# Patient Record
Sex: FEMALE | Race: WHITE | Employment: OTHER | URBAN - METROPOLITAN AREA
[De-identification: names, ages, dates, MRNs, and addresses within clinical notes are randomized per-mention and may not be internally consistent; named-entity substitution may affect disease eponyms.]

---

## 2017-07-30 ENCOUNTER — HOSPITAL ENCOUNTER (OUTPATIENT)
Age: 67
Discharge: HOME | End: 2017-07-30
Attending: EMERGENCY MEDICINE
Payer: COMMERCIAL

## 2017-07-30 VITALS
WEIGHT: 160 LBS | RESPIRATION RATE: 16 BRPM | OXYGEN SATURATION: 100 % | TEMPERATURE: 98 F | SYSTOLIC BLOOD PRESSURE: 156 MMHG | HEART RATE: 71 BPM | BODY MASS INDEX: 25.11 KG/M2 | HEIGHT: 67 IN | DIASTOLIC BLOOD PRESSURE: 86 MMHG

## 2017-07-30 DIAGNOSIS — H10.13 ALLERGIC CONJUNCTIVITIS, BILATERAL: ICD-10-CM

## 2017-07-30 DIAGNOSIS — J30.2 SEASONAL ALLERGIC RHINITIS, UNSPECIFIED CHRONICITY, UNSPECIFIED TRIGGER: Primary | ICD-10-CM

## 2017-07-30 DIAGNOSIS — H65.92 OTITIS MEDIA WITH EFFUSION, LEFT: ICD-10-CM

## 2017-07-30 PROCEDURE — 99204 OFFICE O/P NEW MOD 45 MIN: CPT

## 2017-07-30 PROCEDURE — 99203 OFFICE O/P NEW LOW 30 MIN: CPT

## 2017-07-30 RX ORDER — OLOPATADINE HYDROCHLORIDE 1 MG/ML
1 SOLUTION/ DROPS OPHTHALMIC 2 TIMES DAILY
Qty: 1 BOTTLE | Refills: 0 | Status: SHIPPED | OUTPATIENT
Start: 2017-07-30 | End: 2017-10-06

## 2017-07-30 RX ORDER — FLUTICASONE PROPIONATE 50 MCG
1-2 SPRAY, SUSPENSION (ML) NASAL DAILY
Qty: 16 G | Refills: 0 | Status: SHIPPED | OUTPATIENT
Start: 2017-07-30 | End: 2017-08-29

## 2017-07-30 NOTE — ED INITIAL ASSESSMENT (HPI)
Last Friday developed allery issues with stuffy nose  Used otc sudafed and allegra w/o relief. Now continues with eye running scratching throat deep dry cough-non productive. now eyes swollen this am and left ear plugged.

## 2017-07-30 NOTE — ED PROVIDER NOTES
Patient Seen in: [facility] In [address] [place] [address]    History   Patient presents with:  Cough/URI    Stated Complaint: bilateral pink eyes/    HPI  For the last 5 or 6 days patient has had a runny nose, itchy and watery eyes, scratchy throat, 20/30, Corrected    Physical Exam   Constitutional: She is oriented to person, place, and time. She appears well-developed and well-nourished. HENT:   Head: Normocephalic and atraumatic.    Right Ear: Tympanic membrane normal.   Left Ear: A middle ear eff these medications    Fluticasone Propionate 50 MCG/ACT Nasal Suspension  1-2 sprays by Nasal route daily. , Hawa Disp-16 g, R-0    Olopatadine HCl 0.1 % Ophthalmic Solution  Place 1 drop into both eyes 2 (two) times daily. , Hawa, Disp-1 Bottle, R-0

## 2017-10-06 ENCOUNTER — OFFICE VISIT (OUTPATIENT)
Dept: INTERNAL MEDICINE CLINIC | Facility: CLINIC | Age: 67
End: 2017-10-06

## 2017-10-06 VITALS
DIASTOLIC BLOOD PRESSURE: 90 MMHG | SYSTOLIC BLOOD PRESSURE: 172 MMHG | TEMPERATURE: 99 F | HEART RATE: 72 BPM | OXYGEN SATURATION: 98 % | RESPIRATION RATE: 17 BRPM | HEIGHT: 67 IN | BODY MASS INDEX: 28.44 KG/M2 | WEIGHT: 181.19 LBS

## 2017-10-06 DIAGNOSIS — R03.0 ELEVATED BP WITHOUT DIAGNOSIS OF HYPERTENSION: ICD-10-CM

## 2017-10-06 DIAGNOSIS — Z00.00 ROUTINE GENERAL MEDICAL EXAMINATION AT A HEALTH CARE FACILITY: Primary | ICD-10-CM

## 2017-10-06 DIAGNOSIS — Z12.31 ENCOUNTER FOR SCREENING MAMMOGRAM FOR BREAST CANCER: ICD-10-CM

## 2017-10-06 PROCEDURE — 93000 ELECTROCARDIOGRAM COMPLETE: CPT | Performed by: INTERNAL MEDICINE

## 2017-10-06 PROCEDURE — 99386 PREV VISIT NEW AGE 40-64: CPT | Performed by: INTERNAL MEDICINE

## 2017-10-06 NOTE — PROGRESS NOTES
HPI:    Patient ID: Neva Jonas is a 61year old female.     HPI  Here as new pt to practice, ho total hyst in 2000 for fibroids, last cscope just under 10 years, due for zostavax, wants flu at [facility], has been on bp meds in past, occ walks, no acute c report and bring to fu apt  Encounter for screening mammogram for breast cancer-joanna ordered  Elevated bp without diagnosis of hypertension-states home ok, but ho htn in past, fu in 3-4 weeks with home cuff      Orders Placed This Encounter      Comp [employer]

## 2017-10-20 ENCOUNTER — LAB ENCOUNTER (OUTPATIENT)
Dept: LAB | Age: 67
End: 2017-10-20
Attending: INTERNAL MEDICINE

## 2017-10-20 DIAGNOSIS — R03.0 ELEVATED BP WITHOUT DIAGNOSIS OF HYPERTENSION: ICD-10-CM

## 2017-10-20 PROCEDURE — 80053 COMPREHEN METABOLIC PANEL: CPT | Performed by: INTERNAL MEDICINE

## 2017-10-20 PROCEDURE — 80061 LIPID PANEL: CPT | Performed by: INTERNAL MEDICINE

## 2017-10-20 PROCEDURE — 85025 COMPLETE CBC W/AUTO DIFF WBC: CPT | Performed by: INTERNAL MEDICINE

## 2017-10-20 PROCEDURE — 81001 URINALYSIS AUTO W/SCOPE: CPT | Performed by: INTERNAL MEDICINE

## 2017-11-06 ENCOUNTER — HOSPITAL (OUTPATIENT)
Dept: OTHER | Age: 67
End: 2017-11-06

## 2017-11-07 ENCOUNTER — HOSPITAL (OUTPATIENT)
Dept: OTHER | Age: 67
End: 2017-11-07

## 2017-11-07 ENCOUNTER — IMAGING SERVICES (OUTPATIENT)
Dept: OTHER | Age: 67
End: 2017-11-07

## 2017-11-09 ENCOUNTER — MED REC SCAN ONLY (OUTPATIENT)
Dept: INTERNAL MEDICINE CLINIC | Facility: CLINIC | Age: 67
End: 2017-11-09

## 2017-11-13 ENCOUNTER — OFFICE VISIT (OUTPATIENT)
Dept: INTERNAL MEDICINE CLINIC | Facility: CLINIC | Age: 67
End: 2017-11-13

## 2017-11-13 VITALS
DIASTOLIC BLOOD PRESSURE: 84 MMHG | HEIGHT: 67 IN | HEART RATE: 68 BPM | SYSTOLIC BLOOD PRESSURE: 152 MMHG | TEMPERATURE: 99 F | RESPIRATION RATE: 16 BRPM | BODY MASS INDEX: 28.72 KG/M2 | WEIGHT: 183 LBS

## 2017-11-13 DIAGNOSIS — Z23 FLU VACCINE NEED: Primary | ICD-10-CM

## 2017-11-13 DIAGNOSIS — R79.89 ELEVATED LFTS: ICD-10-CM

## 2017-11-13 DIAGNOSIS — I10 ESSENTIAL HYPERTENSION: ICD-10-CM

## 2017-11-13 PROCEDURE — 90471 IMMUNIZATION ADMIN: CPT | Performed by: INTERNAL MEDICINE

## 2017-11-13 PROCEDURE — 99213 OFFICE O/P EST LOW 20 MIN: CPT | Performed by: INTERNAL MEDICINE

## 2017-11-13 PROCEDURE — 90686 IIV4 VACC NO PRSV 0.5 ML IM: CPT | Performed by: INTERNAL MEDICINE

## 2017-11-13 RX ORDER — IRBESARTAN 150 MG/1
150 TABLET ORAL NIGHTLY
Qty: 30 TABLET | Refills: 1 | Status: SHIPPED | OUTPATIENT
Start: 2017-11-13 | End: 2017-12-13

## 2017-11-13 NOTE — PROGRESS NOTES
HPI:    Patient ID: Maciej Vincent is a 61year old female.     HPI  Here for htn fu, home numbers high, didn't bring cuff, mildly elevated lft's, feels well, labs reviewed  /74 (BP Location: Left arm, Patient Position: Sitting, Cuff Size: adult)

## 2017-11-14 NOTE — PROGRESS NOTES
Pt was notified of benign findings and also informed of recommended f/u in one year 11/2018. Pt verbalized understanding, agreed with POC and had no further questions.

## 2017-11-30 ENCOUNTER — LAB ENCOUNTER (OUTPATIENT)
Dept: LAB | Age: 67
End: 2017-11-30
Attending: INTERNAL MEDICINE

## 2017-11-30 DIAGNOSIS — R79.89 ELEVATED LFTS: ICD-10-CM

## 2017-11-30 DIAGNOSIS — I10 ESSENTIAL HYPERTENSION: ICD-10-CM

## 2017-11-30 PROCEDURE — 82728 ASSAY OF FERRITIN: CPT | Performed by: INTERNAL MEDICINE

## 2017-11-30 PROCEDURE — 80053 COMPREHEN METABOLIC PANEL: CPT | Performed by: INTERNAL MEDICINE

## 2017-11-30 PROCEDURE — 80074 ACUTE HEPATITIS PANEL: CPT | Performed by: INTERNAL MEDICINE

## 2017-12-13 ENCOUNTER — OFFICE VISIT (OUTPATIENT)
Dept: INTERNAL MEDICINE CLINIC | Facility: CLINIC | Age: 67
End: 2017-12-13

## 2017-12-13 VITALS
DIASTOLIC BLOOD PRESSURE: 100 MMHG | BODY MASS INDEX: 28.72 KG/M2 | SYSTOLIC BLOOD PRESSURE: 162 MMHG | HEART RATE: 76 BPM | HEIGHT: 67 IN | TEMPERATURE: 98 F | WEIGHT: 183 LBS

## 2017-12-13 DIAGNOSIS — R00.2 PALPITATIONS: ICD-10-CM

## 2017-12-13 DIAGNOSIS — I10 ESSENTIAL HYPERTENSION: Primary | ICD-10-CM

## 2017-12-13 PROCEDURE — 99213 OFFICE O/P EST LOW 20 MIN: CPT | Performed by: INTERNAL MEDICINE

## 2017-12-13 RX ORDER — IRBESARTAN 150 MG/1
150 TABLET ORAL NIGHTLY
Qty: 30 TABLET | Refills: 1 | Status: SHIPPED | OUTPATIENT
Start: 2017-12-13 | End: 2018-01-19

## 2017-12-13 RX ORDER — AMLODIPINE BESYLATE 5 MG/1
5 TABLET ORAL DAILY
Qty: 30 TABLET | Refills: 3 | Status: SHIPPED | OUTPATIENT
Start: 2017-12-13 | End: 2018-01-19

## 2017-12-13 NOTE — PROGRESS NOTES
HPI:    Patient ID: Chuy Turner is a 74year old female.     HPI  Here for htn, occ palpitations, no cp, bp still up, labs reviewed  BP (!) 162/100   Pulse 76   Temp 97.6 °F (36.4 °C) (Oral)   Ht 67\"   Wt 183 lb   BMI 28.66 kg/m²     Review of System

## 2017-12-18 ENCOUNTER — HOSPITAL ENCOUNTER (OUTPATIENT)
Dept: CV DIAGNOSTICS | Facility: HOSPITAL | Age: 67
Discharge: HOME | End: 2017-12-18
Attending: INTERNAL MEDICINE
Payer: COMMERCIAL

## 2017-12-18 DIAGNOSIS — R00.2 PALPITATIONS: ICD-10-CM

## 2017-12-18 PROCEDURE — 93225 XTRNL ECG REC<48 HRS REC: CPT | Performed by: INTERNAL MEDICINE

## 2017-12-18 PROCEDURE — 93227 XTRNL ECG REC<48 HR R&I: CPT | Performed by: INTERNAL MEDICINE

## 2017-12-18 PROCEDURE — 93226 XTRNL ECG REC<48 HR SCAN A/R: CPT | Performed by: INTERNAL MEDICINE

## 2017-12-19 ENCOUNTER — OFFICE VISIT (OUTPATIENT)
Dept: INTERNAL MEDICINE CLINIC | Facility: CLINIC | Age: 67
End: 2017-12-19

## 2017-12-19 VITALS
BODY MASS INDEX: 28.25 KG/M2 | HEIGHT: 67 IN | SYSTOLIC BLOOD PRESSURE: 142 MMHG | HEART RATE: 80 BPM | WEIGHT: 180 LBS | RESPIRATION RATE: 18 BRPM | DIASTOLIC BLOOD PRESSURE: 84 MMHG

## 2017-12-19 DIAGNOSIS — I10 ESSENTIAL HYPERTENSION: Primary | ICD-10-CM

## 2017-12-19 DIAGNOSIS — R00.2 PALPITATIONS: ICD-10-CM

## 2017-12-19 PROCEDURE — 99214 OFFICE O/P EST MOD 30 MIN: CPT | Performed by: NURSE PRACTITIONER

## 2017-12-19 RX ORDER — HYDROCHLOROTHIAZIDE 12.5 MG/1
12.5 TABLET ORAL DAILY
Qty: 30 TABLET | Refills: 3 | Status: SHIPPED | OUTPATIENT
Start: 2017-12-19 | End: 2018-01-19

## 2017-12-19 NOTE — PROGRESS NOTES
Pinky Webster is a 74year old female. Patient presents with:  Blood Pressure: f/u . Sparkle Miranda after halter monitor. and additional bp meds. HPI:   Here for follow up   Seen by NIVIA 12/13 for HTN and palpitations. holter completed and turned in today.   No EXAM:   /84   Pulse 80   Resp 18   Ht 67\"   Wt 180 lb   BMI 28.19 kg/m²   GENERAL: well developed, well nourished,in no apparent distress  LUNGS: normal rate without respiratory distress, lungs clear to auscultation  CARDIO: RRR without murmur

## 2018-01-19 ENCOUNTER — OFFICE VISIT (OUTPATIENT)
Dept: INTERNAL MEDICINE CLINIC | Facility: CLINIC | Age: 68
End: 2018-01-19

## 2018-01-19 VITALS
DIASTOLIC BLOOD PRESSURE: 72 MMHG | SYSTOLIC BLOOD PRESSURE: 132 MMHG | TEMPERATURE: 98 F | HEIGHT: 67 IN | WEIGHT: 180.63 LBS | HEART RATE: 80 BPM | BODY MASS INDEX: 28.35 KG/M2

## 2018-01-19 DIAGNOSIS — I10 ESSENTIAL HYPERTENSION: Primary | ICD-10-CM

## 2018-01-19 DIAGNOSIS — Z78.0 POST-MENOPAUSAL: ICD-10-CM

## 2018-01-19 DIAGNOSIS — R00.2 PALPITATION: ICD-10-CM

## 2018-01-19 DIAGNOSIS — Z12.11 SPECIAL SCREENING FOR MALIGNANT NEOPLASMS, COLON: ICD-10-CM

## 2018-01-19 PROCEDURE — 90471 IMMUNIZATION ADMIN: CPT | Performed by: INTERNAL MEDICINE

## 2018-01-19 PROCEDURE — 99214 OFFICE O/P EST MOD 30 MIN: CPT | Performed by: INTERNAL MEDICINE

## 2018-01-19 PROCEDURE — 90670 PCV13 VACCINE IM: CPT | Performed by: INTERNAL MEDICINE

## 2018-01-19 RX ORDER — AMLODIPINE BESYLATE 5 MG/1
5 TABLET ORAL DAILY
Qty: 90 TABLET | Refills: 3 | Status: SHIPPED | OUTPATIENT
Start: 2018-01-19 | End: 2018-03-23

## 2018-01-19 RX ORDER — IRBESARTAN 150 MG/1
150 TABLET ORAL NIGHTLY
Qty: 90 TABLET | Refills: 1 | Status: SHIPPED | OUTPATIENT
Start: 2018-01-19 | End: 2018-03-23

## 2018-01-19 RX ORDER — METOPROLOL SUCCINATE 50 MG/1
50 TABLET, EXTENDED RELEASE ORAL DAILY
Qty: 90 TABLET | Refills: 0 | Status: SHIPPED | OUTPATIENT
Start: 2018-01-19 | End: 2018-02-07

## 2018-01-19 NOTE — PROGRESS NOTES
HPI:    Patient ID: Gabriela Rock is a 74year old female.     HPI  Here for palpitation, htn, now 67, needs prevnar and dexa, still awakes with rapid hr a few times a week, no cp or sob, palpitations not better or worse with anthying, not assoc with an (primary encounter diagnosis)-stop hctz, start toprol, bmp in one month and fu in 2  Palpitation-stop hctz, bmp in a month, add toprol, fu in 2 months, reviewed holter results with pt  Post-menopausal-dexa  Special screening for malignant neoplasms, colon-

## 2018-02-06 ENCOUNTER — LAB ENCOUNTER (OUTPATIENT)
Dept: LAB | Age: 68
End: 2018-02-06
Attending: INTERNAL MEDICINE

## 2018-02-06 DIAGNOSIS — I10 ESSENTIAL HYPERTENSION: ICD-10-CM

## 2018-02-06 LAB
BUN BLD-MCNC: 16 MG/DL (ref 8–20)
CALCIUM BLD-MCNC: 9.1 MG/DL (ref 8.3–10.3)
CHLORIDE: 108 MMOL/L (ref 101–111)
CO2: 26 MMOL/L (ref 22–32)
CREAT BLD-MCNC: 0.78 MG/DL (ref 0.55–1.02)
GLUCOSE BLD-MCNC: 83 MG/DL (ref 70–99)
POTASSIUM SERPL-SCNC: 4.2 MMOL/L (ref 3.6–5.1)
SODIUM SERPL-SCNC: 143 MMOL/L (ref 136–144)

## 2018-02-06 PROCEDURE — 80048 BASIC METABOLIC PNL TOTAL CA: CPT | Performed by: INTERNAL MEDICINE

## 2018-02-09 RX ORDER — METOPROLOL SUCCINATE 50 MG/1
50 TABLET, EXTENDED RELEASE ORAL DAILY
Qty: 90 TABLET | Refills: 0 | Status: SHIPPED | OUTPATIENT
Start: 2018-02-09 | End: 2018-03-23

## 2018-02-09 NOTE — TELEPHONE ENCOUNTER
From: Margaret Verma  Sent: 2/7/2018 7:11 PM CST  Subject: Medication Renewal Request    Usman Marimichael.  Hira Realrobelmichael would like a refill of the following medications:     Metoprolol Succinate ER (TOPROL XL) 50 MG Oral Tablet 24 Hr [Bernabe Pitt MD]    Prefer

## 2018-03-23 ENCOUNTER — OFFICE VISIT (OUTPATIENT)
Dept: INTERNAL MEDICINE CLINIC | Facility: CLINIC | Age: 68
End: 2018-03-23

## 2018-03-23 VITALS
DIASTOLIC BLOOD PRESSURE: 80 MMHG | WEIGHT: 186 LBS | TEMPERATURE: 98 F | HEART RATE: 68 BPM | BODY MASS INDEX: 29.19 KG/M2 | SYSTOLIC BLOOD PRESSURE: 132 MMHG | HEIGHT: 67 IN | RESPIRATION RATE: 16 BRPM

## 2018-03-23 DIAGNOSIS — Z12.11 SPECIAL SCREENING FOR MALIGNANT NEOPLASMS, COLON: ICD-10-CM

## 2018-03-23 DIAGNOSIS — R00.2 PALPITATION: ICD-10-CM

## 2018-03-23 DIAGNOSIS — I10 ESSENTIAL HYPERTENSION: Primary | ICD-10-CM

## 2018-03-23 PROCEDURE — 99213 OFFICE O/P EST LOW 20 MIN: CPT | Performed by: INTERNAL MEDICINE

## 2018-03-23 RX ORDER — AMLODIPINE BESYLATE 5 MG/1
5 TABLET ORAL DAILY
Qty: 90 TABLET | Refills: 1 | Status: SHIPPED | OUTPATIENT
Start: 2018-03-23 | End: 2018-06-26

## 2018-03-23 RX ORDER — METOPROLOL SUCCINATE 50 MG/1
50 TABLET, EXTENDED RELEASE ORAL DAILY
Qty: 90 TABLET | Refills: 1 | Status: SHIPPED | OUTPATIENT
Start: 2018-03-23 | End: 2018-10-01

## 2018-03-23 RX ORDER — IRBESARTAN 150 MG/1
150 TABLET ORAL NIGHTLY
Qty: 90 TABLET | Refills: 1 | Status: SHIPPED | OUTPATIENT
Start: 2018-03-23 | End: 2018-06-26

## 2018-03-23 NOTE — PROGRESS NOTES
HPI:    Patient ID: Evert Matos is a 74year old female.     HPI  Here for htn and palpitation fu, due for cscope, wants to wait, will do stool cards  /80   Pulse 68   Temp 97.6 °F (36.4 °C) (Oral)   Resp 16   Ht 67\"   Wt 186 lb   BMI 29.13 kg/

## 2018-05-15 ENCOUNTER — APPOINTMENT (OUTPATIENT)
Dept: LAB | Facility: HOSPITAL | Age: 68
End: 2018-05-15
Attending: INTERNAL MEDICINE
Payer: COMMERCIAL

## 2018-05-15 DIAGNOSIS — Z12.11 SPECIAL SCREENING FOR MALIGNANT NEOPLASMS, COLON: ICD-10-CM

## 2018-05-15 PROCEDURE — 82272 OCCULT BLD FECES 1-3 TESTS: CPT

## 2018-06-26 ENCOUNTER — HOSPITAL ENCOUNTER (OUTPATIENT)
Dept: ULTRASOUND IMAGING | Age: 68
Discharge: HOME | End: 2018-06-26
Attending: INTERNAL MEDICINE
Payer: COMMERCIAL

## 2018-06-26 ENCOUNTER — TELEPHONE (OUTPATIENT)
Dept: INTERNAL MEDICINE CLINIC | Facility: CLINIC | Age: 68
End: 2018-06-26

## 2018-06-26 ENCOUNTER — OFFICE VISIT (OUTPATIENT)
Dept: INTERNAL MEDICINE CLINIC | Facility: CLINIC | Age: 68
End: 2018-06-26

## 2018-06-26 VITALS
TEMPERATURE: 99 F | SYSTOLIC BLOOD PRESSURE: 162 MMHG | HEIGHT: 67 IN | HEART RATE: 68 BPM | DIASTOLIC BLOOD PRESSURE: 86 MMHG | WEIGHT: 187 LBS | BODY MASS INDEX: 29.35 KG/M2

## 2018-06-26 DIAGNOSIS — R60.0 BILATERAL LEG EDEMA: ICD-10-CM

## 2018-06-26 DIAGNOSIS — R60.0 BILATERAL LEG EDEMA: Primary | ICD-10-CM

## 2018-06-26 PROCEDURE — 99214 OFFICE O/P EST MOD 30 MIN: CPT | Performed by: INTERNAL MEDICINE

## 2018-06-26 PROCEDURE — 93971 EXTREMITY STUDY: CPT | Performed by: INTERNAL MEDICINE

## 2018-06-26 RX ORDER — IRBESARTAN AND HYDROCHLOROTHIAZIDE 300; 12.5 MG/1; MG/1
1 TABLET, FILM COATED ORAL DAILY
Qty: 30 TABLET | Refills: 1 | Status: SHIPPED | OUTPATIENT
Start: 2018-06-26 | End: 2018-08-03

## 2018-06-26 NOTE — TELEPHONE ENCOUNTER
Sanket Michele at Radiology called with STAT Venous US Left Leg is negative. Pt notified the results. Pt believes she was to do labwork , start Ibesartan-HCTZ and return to see JL. When should pt return for f/u? Please advise.

## 2018-06-27 NOTE — TELEPHONE ENCOUNTER
MD Min Brady, RN; [facility] Clinical Staff 14 hours ago (5:55 PM)  I told the pt to get labs in 3 weeks and fu with me in 4 shirlene weeks (Routing comment)     [phone]  Called pt to inform, per JL, to get labs in 3 weeks and RTC for F/

## 2018-06-27 NOTE — PROGRESS NOTES
HPI:    Patient ID: Cory Guerrero is a 74year old female.     HPI  Here with co bilateral but left greater than right le edema for 2 weeks, no flights or long car rides, no cp or sob, htn, on norvas  BP (!) 162/86   Pulse 68   Temp 98.8 °F (37.1 °C) ( 300-12.5 MG Oral Tab 30 tablet 1      Sig: Take 1 tablet by mouth daily.            Imaging & Referrals:  None       SV#[id number]

## 2018-07-13 ENCOUNTER — TELEPHONE (OUTPATIENT)
Dept: INTERNAL MEDICINE CLINIC | Facility: CLINIC | Age: 68
End: 2018-07-13

## 2018-07-13 NOTE — TELEPHONE ENCOUNTER
Per NIVIA OV notes 6/26/18: Bilateral leg edema  (primary encounter diagnosis)-stat doppler, if negative home stop amlodipine, start hctz and increase arb, labs and fu in one month. Ok to see [employer] in August or should see another partner sooner?   Please advis

## 2018-07-13 NOTE — TELEPHONE ENCOUNTER
Why does she feel she needs to be seen next week? Is she having issues or problems? Routine follow up? Please triage.

## 2018-07-13 NOTE — TELEPHONE ENCOUNTER
Patient states she saw Dr Ed Kwok on 6/26/2018 and was advised to follow up with him in 4 weeks. She states she would like to see him next week.   I let her know that he doesn't have an appointment available until August 1st, but I could get her in with a mid-l

## 2018-07-18 ENCOUNTER — MED REC SCAN ONLY (OUTPATIENT)
Dept: INTERNAL MEDICINE CLINIC | Facility: CLINIC | Age: 68
End: 2018-07-18

## 2018-07-18 NOTE — TELEPHONE ENCOUNTER
Future Appointments  Date Time Provider Meghan Robyn   8/3/2018 7:45 AM Rigo Schmidt MD [facility] IM   10/19/2018 7:45 AM Rigo Schmidt MD [facility] IM

## 2018-07-30 ENCOUNTER — LAB ENCOUNTER (OUTPATIENT)
Dept: LAB | Age: 68
End: 2018-07-30
Attending: INTERNAL MEDICINE
Payer: COMMERCIAL

## 2018-07-30 DIAGNOSIS — R60.0 BILATERAL LEG EDEMA: ICD-10-CM

## 2018-07-30 LAB
ALBUMIN SERPL BCP-MCNC: 3.8 G/DL (ref 3.5–4.8)
ALBUMIN/GLOB SERPL: 1.6 {RATIO} (ref 1–2)
ALP SERPL-CCNC: 107 U/L (ref 32–100)
ALT SERPL-CCNC: 24 U/L (ref 14–54)
ANION GAP SERPL CALC-SCNC: 8 MMOL/L (ref 0–18)
AST SERPL-CCNC: 22 U/L (ref 15–41)
BASOPHILS # BLD: 0 K/UL (ref 0–0.2)
BASOPHILS NFR BLD: 1 %
BILIRUB SERPL-MCNC: 0.8 MG/DL (ref 0.3–1.2)
BUN SERPL-MCNC: 17 MG/DL (ref 8–20)
BUN/CREAT SERPL: 21.8 (ref 10–20)
CALCIUM SERPL-MCNC: 9.4 MG/DL (ref 8.5–10.5)
CHLORIDE SERPL-SCNC: 107 MMOL/L (ref 95–110)
CO2 SERPL-SCNC: 27 MMOL/L (ref 22–32)
CREAT SERPL-MCNC: 0.78 MG/DL (ref 0.5–1.5)
EOSINOPHIL # BLD: 0.2 K/UL (ref 0–0.7)
EOSINOPHIL NFR BLD: 3 %
ERYTHROCYTE [DISTWIDTH] IN BLOOD BY AUTOMATED COUNT: 13.5 % (ref 11–15)
GLOBULIN PLAS-MCNC: 2.4 G/DL (ref 2.5–3.7)
GLUCOSE SERPL-MCNC: 87 MG/DL (ref 70–99)
HCT VFR BLD AUTO: 40 % (ref 35–48)
HGB BLD-MCNC: 13.6 G/DL (ref 12–16)
LYMPHOCYTES # BLD: 1.7 K/UL (ref 1–4)
LYMPHOCYTES NFR BLD: 28 %
MCH RBC QN AUTO: 31.5 PG (ref 27–32)
MCHC RBC AUTO-ENTMCNC: 34 G/DL (ref 32–37)
MCV RBC AUTO: 92.6 FL (ref 80–100)
MONOCYTES # BLD: 0.6 K/UL (ref 0–1)
MONOCYTES NFR BLD: 10 %
NEUTROPHILS # BLD AUTO: 3.5 K/UL (ref 1.8–7.7)
NEUTROPHILS NFR BLD: 59 %
OSMOLALITY UR CALC.SUM OF ELEC: 295 MOSM/KG (ref 275–295)
PATIENT FASTING: NO
PLATELET # BLD AUTO: 214 K/UL (ref 140–400)
PMV BLD AUTO: 9.9 FL (ref 7.4–10.3)
POTASSIUM SERPL-SCNC: 4 MMOL/L (ref 3.3–5.1)
PROT SERPL-MCNC: 6.2 G/DL (ref 5.9–8.4)
RBC # BLD AUTO: 4.32 M/UL (ref 3.7–5.4)
SODIUM SERPL-SCNC: 142 MMOL/L (ref 136–144)
TSH SERPL-ACNC: 1.81 UIU/ML (ref 0.45–5.33)
WBC # BLD AUTO: 6 K/UL (ref 4–11)

## 2018-07-30 PROCEDURE — 36415 COLL VENOUS BLD VENIPUNCTURE: CPT

## 2018-07-30 PROCEDURE — 84443 ASSAY THYROID STIM HORMONE: CPT

## 2018-07-30 PROCEDURE — 80053 COMPREHEN METABOLIC PANEL: CPT

## 2018-07-30 PROCEDURE — 85025 COMPLETE CBC W/AUTO DIFF WBC: CPT

## 2018-08-03 ENCOUNTER — OFFICE VISIT (OUTPATIENT)
Dept: INTERNAL MEDICINE CLINIC | Facility: CLINIC | Age: 68
End: 2018-08-03
Payer: COMMERCIAL

## 2018-08-03 VITALS
BODY MASS INDEX: 29.82 KG/M2 | HEART RATE: 68 BPM | HEIGHT: 67 IN | DIASTOLIC BLOOD PRESSURE: 88 MMHG | SYSTOLIC BLOOD PRESSURE: 126 MMHG | RESPIRATION RATE: 18 BRPM | WEIGHT: 190 LBS | TEMPERATURE: 99 F

## 2018-08-03 DIAGNOSIS — R60.0 BILATERAL LEG EDEMA: ICD-10-CM

## 2018-08-03 DIAGNOSIS — I10 ESSENTIAL HYPERTENSION: Primary | ICD-10-CM

## 2018-08-03 PROCEDURE — 99213 OFFICE O/P EST LOW 20 MIN: CPT | Performed by: INTERNAL MEDICINE

## 2018-08-03 RX ORDER — HYDROCHLOROTHIAZIDE 25 MG/1
25 TABLET ORAL DAILY
Qty: 30 TABLET | Refills: 3 | Status: SHIPPED | OUTPATIENT
Start: 2018-08-03 | End: 2018-10-01

## 2018-08-03 RX ORDER — IRBESARTAN 150 MG/1
150 TABLET ORAL NIGHTLY
Qty: 30 TABLET | Refills: 4 | Status: SHIPPED | OUTPATIENT
Start: 2018-08-03 | End: 2018-10-01

## 2018-08-03 NOTE — PROGRESS NOTES
HPI:    Patient ID: Kristina Valenzuela is a 74year old female.     HPI  Here for htn and le edema, occ lightheaded lasts few seconds, no syncope, le edema better but still slightly there  /88   Pulse 68   Temp 98.6 °F (37 °C) (Oral)   Resp 18   Ht 67\ Imaging & Referrals:  None       AB#[id number]

## 2018-10-02 RX ORDER — HYDROCHLOROTHIAZIDE 25 MG/1
25 TABLET ORAL DAILY
Qty: 90 TABLET | Refills: 0 | Status: SHIPPED | OUTPATIENT
Start: 2018-10-02 | End: 2018-12-20

## 2018-10-02 RX ORDER — IRBESARTAN 150 MG/1
150 TABLET ORAL NIGHTLY
Qty: 90 TABLET | Refills: 0 | Status: SHIPPED | OUTPATIENT
Start: 2018-10-02 | End: 2018-12-20

## 2018-10-02 RX ORDER — METOPROLOL SUCCINATE 50 MG/1
50 TABLET, EXTENDED RELEASE ORAL DAILY
Qty: 90 TABLET | Refills: 0 | Status: SHIPPED | OUTPATIENT
Start: 2018-10-02 | End: 2018-12-20

## 2018-10-19 ENCOUNTER — APPOINTMENT (OUTPATIENT)
Dept: LAB | Age: 68
End: 2018-10-19
Attending: INTERNAL MEDICINE
Payer: COMMERCIAL

## 2018-10-19 ENCOUNTER — OFFICE VISIT (OUTPATIENT)
Dept: INTERNAL MEDICINE CLINIC | Facility: CLINIC | Age: 68
End: 2018-10-19
Payer: COMMERCIAL

## 2018-10-19 VITALS
DIASTOLIC BLOOD PRESSURE: 82 MMHG | BODY MASS INDEX: 29.51 KG/M2 | TEMPERATURE: 98 F | WEIGHT: 188 LBS | SYSTOLIC BLOOD PRESSURE: 118 MMHG | HEART RATE: 64 BPM | RESPIRATION RATE: 14 BRPM | HEIGHT: 67 IN

## 2018-10-19 DIAGNOSIS — I10 ESSENTIAL HYPERTENSION: ICD-10-CM

## 2018-10-19 DIAGNOSIS — Z23 NEEDS FLU SHOT: Primary | ICD-10-CM

## 2018-10-19 DIAGNOSIS — Z12.31 ENCOUNTER FOR SCREENING MAMMOGRAM FOR BREAST CANCER: ICD-10-CM

## 2018-10-19 DIAGNOSIS — Z12.11 SPECIAL SCREENING FOR MALIGNANT NEOPLASMS, COLON: ICD-10-CM

## 2018-10-19 DIAGNOSIS — Z00.00 ROUTINE GENERAL MEDICAL EXAMINATION AT A HEALTH CARE FACILITY: ICD-10-CM

## 2018-10-19 PROCEDURE — 80048 BASIC METABOLIC PNL TOTAL CA: CPT | Performed by: INTERNAL MEDICINE

## 2018-10-19 PROCEDURE — 99212 OFFICE O/P EST SF 10 MIN: CPT | Performed by: INTERNAL MEDICINE

## 2018-10-19 PROCEDURE — 90653 IIV ADJUVANT VACCINE IM: CPT | Performed by: INTERNAL MEDICINE

## 2018-10-19 PROCEDURE — 90471 IMMUNIZATION ADMIN: CPT | Performed by: INTERNAL MEDICINE

## 2018-10-19 PROCEDURE — 99397 PER PM REEVAL EST PAT 65+ YR: CPT | Performed by: INTERNAL MEDICINE

## 2018-10-19 RX ORDER — IBUPROFEN 800 MG/1
1 TABLET ORAL AS NEEDED
COMMUNITY
Start: 2018-10-11 | End: 2019-11-13

## 2018-10-19 NOTE — PROGRESS NOTES
HPI:    Patient ID: Gabriela Rock is a 74year old female.     HPI  Here for pe, htn, due for dexa, joanna in winter, seeing derm soon, no acute complaints  /82 (BP Location: Right arm, Patient Position: Sitting, Cuff Size: adult)   Pulse 64   Temp 9 There is no tenderness. Musculoskeletal: She exhibits no edema. Neurological: She is oriented to person, place, and time. No cranial nerve deficit. Skin: Skin is warm and dry. No rash noted. She is not diaphoretic.    Psychiatric: She has a normal moo

## 2018-11-29 ENCOUNTER — HOSPITAL (OUTPATIENT)
Dept: OTHER | Age: 68
End: 2018-11-29

## 2018-11-30 ENCOUNTER — MED REC SCAN ONLY (OUTPATIENT)
Dept: INTERNAL MEDICINE CLINIC | Facility: CLINIC | Age: 68
End: 2018-11-30

## 2018-12-18 ENCOUNTER — MED REC SCAN ONLY (OUTPATIENT)
Dept: INTERNAL MEDICINE CLINIC | Facility: CLINIC | Age: 68
End: 2018-12-18

## 2018-12-20 RX ORDER — HYDROCHLOROTHIAZIDE 25 MG/1
25 TABLET ORAL DAILY
Qty: 90 TABLET | Refills: 0 | Status: SHIPPED | OUTPATIENT
Start: 2018-12-20 | End: 2019-03-04

## 2018-12-20 RX ORDER — METOPROLOL SUCCINATE 50 MG/1
50 TABLET, EXTENDED RELEASE ORAL DAILY
Qty: 90 TABLET | Refills: 0 | Status: SHIPPED | OUTPATIENT
Start: 2018-12-20 | End: 2019-03-04

## 2018-12-20 RX ORDER — IRBESARTAN 150 MG/1
150 TABLET ORAL NIGHTLY
Qty: 90 TABLET | Refills: 0 | Status: SHIPPED | OUTPATIENT
Start: 2018-12-20 | End: 2019-03-04

## 2019-01-24 ENCOUNTER — TELEPHONE (OUTPATIENT)
Dept: INTERNAL MEDICINE CLINIC | Facility: CLINIC | Age: 69
End: 2019-01-24

## 2019-03-05 RX ORDER — METOPROLOL SUCCINATE 50 MG/1
TABLET, EXTENDED RELEASE ORAL
Qty: 90 TABLET | Refills: 3 | Status: SHIPPED | OUTPATIENT
Start: 2019-03-05 | End: 2020-03-09

## 2019-03-05 RX ORDER — IRBESARTAN 150 MG/1
TABLET ORAL
Qty: 90 TABLET | Refills: 3 | Status: SHIPPED | OUTPATIENT
Start: 2019-03-05 | End: 2020-03-09

## 2019-03-05 RX ORDER — HYDROCHLOROTHIAZIDE 25 MG/1
TABLET ORAL
Qty: 90 TABLET | Refills: 3 | Status: SHIPPED | OUTPATIENT
Start: 2019-03-05 | End: 2020-03-09

## 2019-05-31 RX ORDER — METOPROLOL SUCCINATE 50 MG/1
50 TABLET, EXTENDED RELEASE ORAL
Qty: 90 TABLET | Refills: 3 | OUTPATIENT
Start: 2019-05-31

## 2019-05-31 RX ORDER — IRBESARTAN 150 MG/1
150 TABLET ORAL
Qty: 90 TABLET | Refills: 3 | OUTPATIENT
Start: 2019-05-31

## 2019-05-31 RX ORDER — HYDROCHLOROTHIAZIDE 25 MG/1
25 TABLET ORAL
Qty: 90 TABLET | Refills: 3 | OUTPATIENT
Start: 2019-05-31

## 2019-06-04 ENCOUNTER — MED REC SCAN ONLY (OUTPATIENT)
Dept: INTERNAL MEDICINE CLINIC | Facility: CLINIC | Age: 69
End: 2019-06-04

## 2019-09-30 ENCOUNTER — OFFICE VISIT (OUTPATIENT)
Dept: INTERNAL MEDICINE CLINIC | Facility: CLINIC | Age: 69
End: 2019-09-30
Payer: COMMERCIAL

## 2019-09-30 VITALS
TEMPERATURE: 99 F | WEIGHT: 193.19 LBS | HEIGHT: 68.5 IN | OXYGEN SATURATION: 95 % | RESPIRATION RATE: 16 BRPM | HEART RATE: 64 BPM | SYSTOLIC BLOOD PRESSURE: 126 MMHG | BODY MASS INDEX: 28.94 KG/M2 | DIASTOLIC BLOOD PRESSURE: 82 MMHG

## 2019-09-30 DIAGNOSIS — I10 ESSENTIAL HYPERTENSION: ICD-10-CM

## 2019-09-30 DIAGNOSIS — R26.81 GAIT INSTABILITY: Primary | ICD-10-CM

## 2019-09-30 DIAGNOSIS — L29.9 ITCHING: ICD-10-CM

## 2019-09-30 DIAGNOSIS — Z12.11 SPECIAL SCREENING FOR MALIGNANT NEOPLASMS, COLON: ICD-10-CM

## 2019-09-30 PROCEDURE — 90471 IMMUNIZATION ADMIN: CPT | Performed by: INTERNAL MEDICINE

## 2019-09-30 PROCEDURE — 90686 IIV4 VACC NO PRSV 0.5 ML IM: CPT | Performed by: INTERNAL MEDICINE

## 2019-09-30 PROCEDURE — 99213 OFFICE O/P EST LOW 20 MIN: CPT | Performed by: INTERNAL MEDICINE

## 2019-10-01 NOTE — PROGRESS NOTES
HPI:    Patient ID: Meka Stevens is a 79year old female.     HPI  Here concerned about bp, feels vertigo occ, with ambulation, never with standing, no light headed, no vision changes, bps ok at home  /82 (BP Location: Right arm, Patient Position Flulaval 6 months and older 0.5 ml Quad PF [id number]      Meds This Visit:  Requested Prescriptions      No prescriptions requested or ordered in this encounter       Imaging & Referrals:  FLULAVAL INFLUENZA VACCINE QUAD PRESERVATIVE FREE 0.5 ML  NEURO - INT

## 2019-10-04 DIAGNOSIS — E53.8 LOW VITAMIN B12 LEVEL: Primary | ICD-10-CM

## 2019-10-10 ENCOUNTER — TELEPHONE (OUTPATIENT)
Dept: INTERNAL MEDICINE CLINIC | Facility: CLINIC | Age: 69
End: 2019-10-10

## 2019-10-10 NOTE — TELEPHONE ENCOUNTER
cpe   Future Appointments   Date Time Provider Meghan Robyn   11/6/2019  2:45 PM Sina Daigle MD [facility]   11/13/2019  1:00 PM Parul Washington DO [facility] [place]   1/21/2020 11:15 AM Celio Doherty MD [facility]

## 2019-11-06 ENCOUNTER — OFFICE VISIT (OUTPATIENT)
Dept: INTERNAL MEDICINE CLINIC | Facility: CLINIC | Age: 69
End: 2019-11-06
Payer: COMMERCIAL

## 2019-11-06 VITALS
WEIGHT: 191 LBS | HEART RATE: 62 BPM | SYSTOLIC BLOOD PRESSURE: 122 MMHG | TEMPERATURE: 99 F | OXYGEN SATURATION: 98 % | BODY MASS INDEX: 28.62 KG/M2 | DIASTOLIC BLOOD PRESSURE: 68 MMHG | HEIGHT: 68.5 IN | RESPIRATION RATE: 16 BRPM

## 2019-11-06 DIAGNOSIS — Z00.00 ROUTINE GENERAL MEDICAL EXAMINATION AT A HEALTH CARE FACILITY: ICD-10-CM

## 2019-11-06 DIAGNOSIS — Z12.31 ENCOUNTER FOR SCREENING MAMMOGRAM FOR BREAST CANCER: ICD-10-CM

## 2019-11-06 DIAGNOSIS — I10 ESSENTIAL HYPERTENSION: Primary | ICD-10-CM

## 2019-11-06 DIAGNOSIS — E53.8 B12 DEFICIENCY: ICD-10-CM

## 2019-11-06 PROCEDURE — 99397 PER PM REEVAL EST PAT 65+ YR: CPT | Performed by: INTERNAL MEDICINE

## 2019-11-06 PROCEDURE — 90471 IMMUNIZATION ADMIN: CPT | Performed by: INTERNAL MEDICINE

## 2019-11-06 PROCEDURE — 90732 PPSV23 VACC 2 YRS+ SUBQ/IM: CPT | Performed by: INTERNAL MEDICINE

## 2019-11-06 RX ORDER — CHOLECALCIFEROL (VITAMIN D3) 25 MCG
TABLET,CHEWABLE ORAL
COMMUNITY

## 2019-11-06 NOTE — PROGRESS NOTES
HPI:    Patient ID: Karli Trevizo is a 79year old female.     HPI  Here for pe, due for joanna, recent low b12 better on po replacement, seeing neuro upcoming for occ feeling foggy, htn cont, no true orthostasis or lightheadedness  /68 (BP Location: Effort normal and breath sounds normal. She has no wheezes. Breast exam done shows no masses, no nipple dc, no axillary LAD done with [place] assisting     Abdominal: Soft. There is no tenderness. Musculoskeletal:         General: No edema.      Neurologic

## 2019-11-13 ENCOUNTER — OFFICE VISIT (OUTPATIENT)
Dept: NEUROLOGY | Facility: CLINIC | Age: 69
End: 2019-11-13
Payer: COMMERCIAL

## 2019-11-13 VITALS
DIASTOLIC BLOOD PRESSURE: 72 MMHG | HEART RATE: 68 BPM | BODY MASS INDEX: 29 KG/M2 | SYSTOLIC BLOOD PRESSURE: 120 MMHG | RESPIRATION RATE: 16 BRPM | WEIGHT: 195 LBS

## 2019-11-13 DIAGNOSIS — R42 DIZZINESS: Primary | ICD-10-CM

## 2019-11-13 PROCEDURE — 99204 OFFICE O/P NEW MOD 45 MIN: CPT | Performed by: OTHER

## 2019-11-13 NOTE — PROGRESS NOTES
Patient states slight dizziness and lightheadedness that started in July 2019. Patient states this happens when she is walking. Denies any mental fogginess, headaches or vision problems. Deny any recent falls.

## 2019-11-13 NOTE — PROGRESS NOTES
Neurology H&P    Vena Pritesh Patient Status:  No patient class for patient encounter    1950 MRN [id number]   Location [address] [place] [address], [address] [place] [address] Drive, [address] [facility] Attending No att. providers found   [facility] Day # 0 PCP Deshawn Jaquez MD Yes      Alcohol/week: 1.0 standard drinks      Types: 1 Standard drinks or equivalent per week      Comment: Cage done 1-19-18    Drug use: No      Family History:  Family History   Problem Relation Age of Onset   • Hypertension Mother        ROS:  8 poi these episodes transient \"wooziness\" persist. She denies any LOC or vertigo symptoms and has never gelt like she may pass out or had vision changes or other symptoms associated with these symptoms. Plan:  1.  Dizziness  - CTA head and neck  - Sounds

## 2019-11-18 ENCOUNTER — APPOINTMENT (OUTPATIENT)
Dept: LAB | Facility: HOSPITAL | Age: 69
End: 2019-11-18
Attending: INTERNAL MEDICINE
Payer: COMMERCIAL

## 2019-11-18 PROCEDURE — 82274 ASSAY TEST FOR BLOOD FECAL: CPT | Performed by: INTERNAL MEDICINE

## 2019-11-20 ENCOUNTER — TELEPHONE (OUTPATIENT)
Dept: SURGERY | Facility: CLINIC | Age: 69
End: 2019-11-20

## 2019-11-20 NOTE — TELEPHONE ENCOUNTER
Prior Auth Denied for CTA head and neck [id number]    Case# [id number]    Your health care provider can request a peer to peer review by calling [phone]    Denial for CTA head [id number]:  Based on [employer] Guidelines, we cannot approve this re

## 2019-11-21 NOTE — TELEPHONE ENCOUNTER
Spoke with pt, informed her of denial and recommendation to follow up in clinic per note below. Pt understood and scheduled appointment for 01/08/2019 at [address] Advised pt to call office with any further questions or concerns.

## 2019-12-20 ENCOUNTER — MED REC SCAN ONLY (OUTPATIENT)
Dept: INTERNAL MEDICINE CLINIC | Facility: CLINIC | Age: 69
End: 2019-12-20

## 2020-01-08 ENCOUNTER — OFFICE VISIT (OUTPATIENT)
Dept: NEUROLOGY | Facility: CLINIC | Age: 70
End: 2020-01-08
Payer: COMMERCIAL

## 2020-01-08 VITALS
WEIGHT: 190 LBS | RESPIRATION RATE: 16 BRPM | SYSTOLIC BLOOD PRESSURE: 118 MMHG | DIASTOLIC BLOOD PRESSURE: 74 MMHG | HEART RATE: 70 BPM | BODY MASS INDEX: 28 KG/M2

## 2020-01-08 DIAGNOSIS — R42 DIZZINESS: Primary | ICD-10-CM

## 2020-01-08 PROCEDURE — 99213 OFFICE O/P EST LOW 20 MIN: CPT | Performed by: OTHER

## 2020-01-08 NOTE — PROGRESS NOTES
Neurology H&P    Mere Backer Patient Status:  No patient class for patient encounter    1950 MRN [id number]   Location [address] [place] [address], [address] [place] [address] Drive, [address] [facility] Attending No att. providers found   [facility] Day # 0 PCP Caleb Donahue MD Dispense Refill   • Cyanocobalamin (B-12) 1000 MCG Oral Cap Take by mouth.      • HYDROCHLOROTHIAZIDE 25 MG Oral Tab TAKE 1 TABLET BY MOUTH DAILY 90 tablet 3   • IRBESARTAN 150 MG Oral Tab TAKE 1 TABLET BY MOUTH NIGHTLY 90 tablet 3   • METOPROLOL SUCCINATE intact to light touch, pinprick intact    COORDINATION:  No dysmetria, or intention tremors    REFLEXES: 2+at biceps, 2+ brachioradialis, 2+ at patella, 2+ at the ankles    GAIT: normal stance, normal gait      Labs:       Imaging:  No CNS imaging to revie

## 2020-03-10 RX ORDER — METOPROLOL SUCCINATE 50 MG/1
50 TABLET, EXTENDED RELEASE ORAL
Qty: 90 TABLET | Refills: 1 | Status: SHIPPED | OUTPATIENT
Start: 2020-03-10 | End: 2020-07-29

## 2020-03-10 RX ORDER — IRBESARTAN 150 MG/1
150 TABLET ORAL DAILY
Qty: 90 TABLET | Refills: 1 | Status: SHIPPED | OUTPATIENT
Start: 2020-03-10 | End: 2020-07-29

## 2020-03-10 RX ORDER — HYDROCHLOROTHIAZIDE 25 MG/1
25 TABLET ORAL
Qty: 90 TABLET | Refills: 1 | Status: SHIPPED | OUTPATIENT
Start: 2020-03-10 | End: 2020-07-29

## 2020-07-29 ENCOUNTER — OFFICE VISIT (OUTPATIENT)
Dept: INTERNAL MEDICINE CLINIC | Facility: CLINIC | Age: 70
End: 2020-07-29
Payer: COMMERCIAL

## 2020-07-29 VITALS
SYSTOLIC BLOOD PRESSURE: 104 MMHG | TEMPERATURE: 98 F | HEART RATE: 60 BPM | WEIGHT: 192 LBS | DIASTOLIC BLOOD PRESSURE: 62 MMHG | BODY MASS INDEX: 29 KG/M2

## 2020-07-29 DIAGNOSIS — I10 ESSENTIAL HYPERTENSION: Primary | ICD-10-CM

## 2020-07-29 PROCEDURE — 3074F SYST BP LT 130 MM HG: CPT | Performed by: INTERNAL MEDICINE

## 2020-07-29 PROCEDURE — 99213 OFFICE O/P EST LOW 20 MIN: CPT | Performed by: INTERNAL MEDICINE

## 2020-07-29 PROCEDURE — 3078F DIAST BP <80 MM HG: CPT | Performed by: INTERNAL MEDICINE

## 2020-07-29 PROCEDURE — 90750 HZV VACC RECOMBINANT IM: CPT | Performed by: INTERNAL MEDICINE

## 2020-07-29 PROCEDURE — 90471 IMMUNIZATION ADMIN: CPT | Performed by: INTERNAL MEDICINE

## 2020-07-29 RX ORDER — HYDROCHLOROTHIAZIDE 25 MG/1
12.5 TABLET ORAL
Qty: 90 TABLET | Refills: 1 | Status: SHIPPED | OUTPATIENT
Start: 2020-07-29 | End: 2020-09-09

## 2020-07-29 RX ORDER — FLUOCINOLONE ACETONIDE 0.11 MG/ML
OIL AURICULAR (OTIC)
Qty: 1 BOTTLE | Refills: 1 | Status: SHIPPED | OUTPATIENT
Start: 2020-07-29 | End: 2021-09-15 | Stop reason: ALTCHOICE

## 2020-07-29 RX ORDER — IRBESARTAN 150 MG/1
150 TABLET ORAL DAILY
Qty: 90 TABLET | Refills: 1 | Status: SHIPPED | OUTPATIENT
Start: 2020-07-29 | End: 2021-02-25

## 2020-07-29 RX ORDER — METOPROLOL SUCCINATE 50 MG/1
50 TABLET, EXTENDED RELEASE ORAL
Qty: 90 TABLET | Refills: 1 | Status: SHIPPED | OUTPATIENT
Start: 2020-07-29 | End: 2021-02-25

## 2020-07-31 NOTE — PROGRESS NOTES
HPI:    Patient ID: Margaret Verma is a 81year old female.     HPI  Here to fu on light headedness, still occ, did see neuro, no other complaints, bp is down a bit, no syncope  /62 (BP Location: Right arm, Patient Position: Sitting, Cuff Size: rebecca Placed This Encounter      Basic Metabolic Panel (8) [E]      Zoster Recombinant Adjuvanted [Shingrix -Shingles] (91601)      Meds This Visit:  Requested Prescriptions     Signed Prescriptions Disp Refills   • hydrochlorothiazide 25 MG Oral Tab 90 tablet 1

## 2020-08-27 LAB
BUN/CREATININE RATIO: 13 (CALC) (ref 6–22)
BUN: 13 MG/DL (ref 7–25)
CALCIUM: 9.2 MG/DL (ref 8.6–10.4)
CARBON DIOXIDE: 27 MMOL/L (ref 20–32)
CHLORIDE: 106 MMOL/L (ref 98–110)
CREATININE: 1 MG/DL (ref 0.5–0.99)
EGFR IF AFRICN AM: 68 ML/MIN/1.73M2
EGFR IF NONAFRICN AM: 58 ML/MIN/1.73M2
GLUCOSE: 83 MG/DL (ref 65–99)
POTASSIUM: 4.2 MMOL/L (ref 3.5–5.3)
SODIUM: 141 MMOL/L (ref 135–146)

## 2020-09-09 ENCOUNTER — OFFICE VISIT (OUTPATIENT)
Dept: INTERNAL MEDICINE CLINIC | Facility: CLINIC | Age: 70
End: 2020-09-09
Payer: COMMERCIAL

## 2020-09-09 VITALS
OXYGEN SATURATION: 99 % | HEART RATE: 54 BPM | BODY MASS INDEX: 29 KG/M2 | WEIGHT: 196.38 LBS | SYSTOLIC BLOOD PRESSURE: 120 MMHG | TEMPERATURE: 98 F | DIASTOLIC BLOOD PRESSURE: 70 MMHG

## 2020-09-09 DIAGNOSIS — I10 ESSENTIAL HYPERTENSION: Primary | ICD-10-CM

## 2020-09-09 DIAGNOSIS — R42 DIZZINESS: ICD-10-CM

## 2020-09-09 PROCEDURE — 3078F DIAST BP <80 MM HG: CPT | Performed by: INTERNAL MEDICINE

## 2020-09-09 PROCEDURE — 99213 OFFICE O/P EST LOW 20 MIN: CPT | Performed by: INTERNAL MEDICINE

## 2020-09-09 PROCEDURE — 90662 IIV NO PRSV INCREASED AG IM: CPT | Performed by: INTERNAL MEDICINE

## 2020-09-09 PROCEDURE — 3074F SYST BP LT 130 MM HG: CPT | Performed by: INTERNAL MEDICINE

## 2020-09-09 PROCEDURE — 90471 IMMUNIZATION ADMIN: CPT | Performed by: INTERNAL MEDICINE

## 2020-09-09 NOTE — PROGRESS NOTES
HPI:    Patient ID: Kd Rich is a 81year old female.     HPI  Here to fu on htn and dizzyness, dizzyness is better but still happens transiently, no le edema but has had in past off hctz  /70 (BP Location: Right arm, Patient Position: Sittin

## 2020-09-17 ENCOUNTER — TELEPHONE (OUTPATIENT)
Dept: NEUROLOGY | Facility: CLINIC | Age: 70
End: 2020-09-17

## 2020-10-08 ENCOUNTER — TELEPHONE (OUTPATIENT)
Dept: INTERNAL MEDICINE CLINIC | Facility: CLINIC | Age: 70
End: 2020-10-08

## 2020-10-08 DIAGNOSIS — Z13.0 SCREENING FOR BLOOD DISEASE: ICD-10-CM

## 2020-10-08 DIAGNOSIS — Z00.00 ROUTINE GENERAL MEDICAL EXAMINATION AT A HEALTH CARE FACILITY: Primary | ICD-10-CM

## 2020-10-08 DIAGNOSIS — Z13.220 SCREENING FOR LIPID DISORDERS: ICD-10-CM

## 2020-10-08 DIAGNOSIS — Z13.228 SCREENING FOR METABOLIC DISORDER: ICD-10-CM

## 2020-10-08 NOTE — TELEPHONE ENCOUNTER
Future Appointments   Date Time Provider Meghan Carlson   11/16/2020  3:20 PM Valery Warner MD [facility]     Patient is scheduled for Annual Physical.  Please place orders with [facility].  Patient aware to fast.  No call back required.   [place]

## 2020-11-16 ENCOUNTER — OFFICE VISIT (OUTPATIENT)
Dept: INTERNAL MEDICINE CLINIC | Facility: CLINIC | Age: 70
End: 2020-11-16
Payer: COMMERCIAL

## 2020-11-16 VITALS
SYSTOLIC BLOOD PRESSURE: 120 MMHG | RESPIRATION RATE: 16 BRPM | DIASTOLIC BLOOD PRESSURE: 68 MMHG | HEART RATE: 56 BPM | WEIGHT: 193 LBS | TEMPERATURE: 99 F | HEIGHT: 68 IN | BODY MASS INDEX: 29.25 KG/M2

## 2020-11-16 DIAGNOSIS — I10 ESSENTIAL HYPERTENSION: ICD-10-CM

## 2020-11-16 DIAGNOSIS — Z12.31 ENCOUNTER FOR SCREENING MAMMOGRAM FOR BREAST CANCER: Primary | ICD-10-CM

## 2020-11-16 DIAGNOSIS — Z12.11 SPECIAL SCREENING FOR MALIGNANT NEOPLASMS, COLON: ICD-10-CM

## 2020-11-16 DIAGNOSIS — Z00.00 ROUTINE GENERAL MEDICAL EXAMINATION AT A HEALTH CARE FACILITY: ICD-10-CM

## 2020-11-16 PROCEDURE — 3074F SYST BP LT 130 MM HG: CPT | Performed by: INTERNAL MEDICINE

## 2020-11-16 PROCEDURE — 90471 IMMUNIZATION ADMIN: CPT | Performed by: INTERNAL MEDICINE

## 2020-11-16 PROCEDURE — 90750 HZV VACC RECOMBINANT IM: CPT | Performed by: INTERNAL MEDICINE

## 2020-11-16 PROCEDURE — 99397 PER PM REEVAL EST PAT 65+ YR: CPT | Performed by: INTERNAL MEDICINE

## 2020-11-16 PROCEDURE — 3008F BODY MASS INDEX DOCD: CPT | Performed by: INTERNAL MEDICINE

## 2020-11-16 PROCEDURE — 3078F DIAST BP <80 MM HG: CPT | Performed by: INTERNAL MEDICINE

## 2020-11-16 RX ORDER — IRBESARTAN AND HYDROCHLOROTHIAZIDE 150; 12.5 MG/1; MG/1
1 TABLET, FILM COATED ORAL DAILY
Qty: 90 TABLET | Refills: 2 | Status: SHIPPED | OUTPATIENT
Start: 2020-11-16 | End: 2021-02-25

## 2020-11-16 RX ORDER — HYDROCHLOROTHIAZIDE 12.5 MG/1
12.5 TABLET ORAL DAILY
COMMUNITY
End: 2021-02-25

## 2020-11-19 NOTE — PROGRESS NOTES
HPI:    Patient ID: Grupo Sosa is a 81year old female.     HPI  Here for pe, htn, due for colon ca screening and joanna, up to date on vvacines  /68 (BP Location: Right arm, Patient Position: Sitting, Cuff Size: adult)   Pulse 56   Temp 98.9 °F ( heart sounds. No murmur heard. Pulmonary/Chest: Effort normal and breath sounds normal. She has no wheezes. Breast exam done shows no masses, no nipple dc, no axillary LAD, done with Edwina LINARES present during exam     Abdominal: Soft.  There is no abdo

## 2021-02-24 NOTE — TELEPHONE ENCOUNTER
Metoprolol Succinate ER 50 MG Oral Tablet 24 Hr and Irbesartan-hydroCHLOROthiazide 150-12.5 MG Oral Tab    Pt needs new rx sent to new mail order [employer]

## 2021-02-25 RX ORDER — METOPROLOL SUCCINATE 50 MG/1
50 TABLET, EXTENDED RELEASE ORAL
Qty: 90 TABLET | Refills: 1 | Status: SHIPPED | OUTPATIENT
Start: 2021-02-25 | End: 2021-08-19

## 2021-02-25 RX ORDER — IRBESARTAN AND HYDROCHLOROTHIAZIDE 150; 12.5 MG/1; MG/1
1 TABLET, FILM COATED ORAL DAILY
Qty: 90 TABLET | Refills: 1 | Status: SHIPPED | OUTPATIENT
Start: 2021-02-25 | End: 2021-08-19

## 2021-03-13 DIAGNOSIS — Z23 NEED FOR VACCINATION: ICD-10-CM

## 2021-04-15 ENCOUNTER — TELEPHONE (OUTPATIENT)
Dept: INTERNAL MEDICINE CLINIC | Facility: CLINIC | Age: 71
End: 2021-04-15

## 2021-04-15 DIAGNOSIS — I10 ESSENTIAL HYPERTENSION: ICD-10-CM

## 2021-04-15 DIAGNOSIS — Z00.00 ROUTINE GENERAL MEDICAL EXAMINATION AT A HEALTH CARE FACILITY: Primary | ICD-10-CM

## 2021-04-15 NOTE — TELEPHONE ENCOUNTER
Pt scheduled for AWV with Dr. Zulma Berger and is requesting lab orders to be done at THE [facility].     Future Appointments   Date Time Provider Meghan Carlson   8/4/2021  9:40 AM Neymar Steiner MD [facility]

## 2021-04-20 ENCOUNTER — IMAGING SERVICES (OUTPATIENT)
Dept: MAMMOGRAPHY | Age: 71
End: 2021-04-20

## 2021-04-20 DIAGNOSIS — Z12.31 OTHER SCREENING MAMMOGRAM: ICD-10-CM

## 2021-04-20 PROCEDURE — 77067 SCR MAMMO BI INCL CAD: CPT | Performed by: RADIOLOGY

## 2021-04-20 PROCEDURE — 77063 BREAST TOMOSYNTHESIS BI: CPT | Performed by: RADIOLOGY

## 2021-04-21 ENCOUNTER — TELEPHONE (OUTPATIENT)
Dept: INTERNAL MEDICINE CLINIC | Facility: CLINIC | Age: 71
End: 2021-04-21

## 2021-08-23 RX ORDER — METOPROLOL SUCCINATE 50 MG/1
50 TABLET, EXTENDED RELEASE ORAL
Qty: 90 TABLET | Refills: 0 | Status: SHIPPED | OUTPATIENT
Start: 2021-08-23 | End: 2021-09-01

## 2021-08-23 RX ORDER — IRBESARTAN AND HYDROCHLOROTHIAZIDE 150; 12.5 MG/1; MG/1
1 TABLET, FILM COATED ORAL DAILY
Qty: 90 TABLET | Refills: 0 | Status: SHIPPED | OUTPATIENT
Start: 2021-08-23 | End: 2021-09-01

## 2021-09-01 RX ORDER — IRBESARTAN AND HYDROCHLOROTHIAZIDE 150; 12.5 MG/1; MG/1
TABLET, FILM COATED ORAL
Qty: 90 TABLET | Refills: 0 | Status: SHIPPED | OUTPATIENT
Start: 2021-09-01 | End: 2021-09-15

## 2021-09-01 RX ORDER — METOPROLOL SUCCINATE 50 MG/1
TABLET, EXTENDED RELEASE ORAL
Qty: 90 TABLET | Refills: 0 | Status: SHIPPED | OUTPATIENT
Start: 2021-09-01 | End: 2021-09-15

## 2021-09-02 ENCOUNTER — LAB ENCOUNTER (OUTPATIENT)
Dept: LAB | Age: 71
End: 2021-09-02
Attending: INTERNAL MEDICINE
Payer: MEDICARE

## 2021-09-02 DIAGNOSIS — I10 ESSENTIAL HYPERTENSION: ICD-10-CM

## 2021-09-02 DIAGNOSIS — Z00.00 ROUTINE GENERAL MEDICAL EXAMINATION AT A HEALTH CARE FACILITY: ICD-10-CM

## 2021-09-02 LAB
ALBUMIN SERPL-MCNC: 3.7 G/DL (ref 3.4–5)
ALBUMIN/GLOB SERPL: 1.2 {RATIO} (ref 1–2)
ALP LIVER SERPL-CCNC: 103 U/L
ALT SERPL-CCNC: 27 U/L
ANION GAP SERPL CALC-SCNC: 5 MMOL/L (ref 0–18)
AST SERPL-CCNC: 16 U/L (ref 15–37)
BASOPHILS # BLD AUTO: 0.07 X10(3) UL (ref 0–0.2)
BASOPHILS NFR BLD AUTO: 1 %
BILIRUB SERPL-MCNC: 0.9 MG/DL (ref 0.1–2)
BUN BLD-MCNC: 17 MG/DL (ref 7–18)
CALCIUM BLD-MCNC: 9.3 MG/DL (ref 8.5–10.1)
CHLORIDE SERPL-SCNC: 112 MMOL/L (ref 98–112)
CHOLEST SMN-MCNC: 163 MG/DL (ref ?–200)
CO2 SERPL-SCNC: 25 MMOL/L (ref 21–32)
CREAT BLD-MCNC: 0.92 MG/DL
EOSINOPHIL # BLD AUTO: 0.22 X10(3) UL (ref 0–0.7)
EOSINOPHIL NFR BLD AUTO: 3.2 %
ERYTHROCYTE [DISTWIDTH] IN BLOOD BY AUTOMATED COUNT: 12.7 %
GLOBULIN PLAS-MCNC: 3.1 G/DL (ref 2.8–4.4)
GLUCOSE BLD-MCNC: 87 MG/DL (ref 70–99)
HCT VFR BLD AUTO: 42.4 %
HDLC SERPL-MCNC: 68 MG/DL (ref 40–59)
HGB BLD-MCNC: 14.1 G/DL
IMM GRANULOCYTES # BLD AUTO: 0.02 X10(3) UL (ref 0–1)
IMM GRANULOCYTES NFR BLD: 0.3 %
LDLC SERPL CALC-MCNC: 82 MG/DL (ref ?–100)
LYMPHOCYTES # BLD AUTO: 1.99 X10(3) UL (ref 1–4)
LYMPHOCYTES NFR BLD AUTO: 29.3 %
M PROTEIN MFR SERPL ELPH: 6.8 G/DL (ref 6.4–8.2)
MCH RBC QN AUTO: 31.7 PG (ref 26–34)
MCHC RBC AUTO-ENTMCNC: 33.3 G/DL (ref 31–37)
MCV RBC AUTO: 95.3 FL
MONOCYTES # BLD AUTO: 0.79 X10(3) UL (ref 0.1–1)
MONOCYTES NFR BLD AUTO: 11.6 %
NEUTROPHILS # BLD AUTO: 3.71 X10 (3) UL (ref 1.5–7.7)
NEUTROPHILS # BLD AUTO: 3.71 X10(3) UL (ref 1.5–7.7)
NEUTROPHILS NFR BLD AUTO: 54.6 %
NONHDLC SERPL-MCNC: 95 MG/DL (ref ?–130)
OSMOLALITY SERPL CALC.SUM OF ELEC: 295 MOSM/KG (ref 275–295)
PATIENT FASTING Y/N/NP: YES
PATIENT FASTING Y/N/NP: YES
PLATELET # BLD AUTO: 221 10(3)UL (ref 150–450)
POTASSIUM SERPL-SCNC: 3.6 MMOL/L (ref 3.5–5.1)
RBC # BLD AUTO: 4.45 X10(6)UL
SODIUM SERPL-SCNC: 142 MMOL/L (ref 136–145)
TRIGL SERPL-MCNC: 66 MG/DL (ref 30–149)
VLDLC SERPL CALC-MCNC: 10 MG/DL (ref 0–30)
WBC # BLD AUTO: 6.8 X10(3) UL (ref 4–11)

## 2021-09-02 PROCEDURE — 80061 LIPID PANEL: CPT

## 2021-09-02 PROCEDURE — 85025 COMPLETE CBC W/AUTO DIFF WBC: CPT

## 2021-09-02 PROCEDURE — 36415 COLL VENOUS BLD VENIPUNCTURE: CPT

## 2021-09-02 PROCEDURE — 80053 COMPREHEN METABOLIC PANEL: CPT

## 2021-09-15 ENCOUNTER — OFFICE VISIT (OUTPATIENT)
Dept: INTERNAL MEDICINE CLINIC | Facility: CLINIC | Age: 71
End: 2021-09-15
Payer: MEDICARE

## 2021-09-15 ENCOUNTER — TELEPHONE (OUTPATIENT)
Dept: INTERNAL MEDICINE CLINIC | Facility: CLINIC | Age: 71
End: 2021-09-15

## 2021-09-15 VITALS
WEIGHT: 188 LBS | TEMPERATURE: 97 F | BODY MASS INDEX: 29.51 KG/M2 | SYSTOLIC BLOOD PRESSURE: 126 MMHG | HEART RATE: 78 BPM | OXYGEN SATURATION: 99 % | DIASTOLIC BLOOD PRESSURE: 82 MMHG | RESPIRATION RATE: 16 BRPM | HEIGHT: 67 IN

## 2021-09-15 DIAGNOSIS — Z12.31 ENCOUNTER FOR SCREENING MAMMOGRAM FOR MALIGNANT NEOPLASM OF BREAST: Primary | ICD-10-CM

## 2021-09-15 DIAGNOSIS — I10 ESSENTIAL HYPERTENSION: Primary | ICD-10-CM

## 2021-09-15 DIAGNOSIS — Z00.00 ROUTINE GENERAL MEDICAL EXAMINATION AT A HEALTH CARE FACILITY: ICD-10-CM

## 2021-09-15 DIAGNOSIS — Z78.0 POST-MENOPAUSAL: ICD-10-CM

## 2021-09-15 PROCEDURE — G0402 INITIAL PREVENTIVE EXAM: HCPCS | Performed by: INTERNAL MEDICINE

## 2021-09-15 PROCEDURE — 99213 OFFICE O/P EST LOW 20 MIN: CPT | Performed by: INTERNAL MEDICINE

## 2021-09-15 RX ORDER — METOPROLOL SUCCINATE 50 MG/1
50 TABLET, EXTENDED RELEASE ORAL DAILY
Qty: 90 TABLET | Refills: 3 | Status: SHIPPED | OUTPATIENT
Start: 2021-09-15 | End: 2021-11-17

## 2021-09-15 RX ORDER — GLUCOSAMINE HCL 500 MG
TABLET ORAL
COMMUNITY

## 2021-09-15 RX ORDER — IRBESARTAN AND HYDROCHLOROTHIAZIDE 150; 12.5 MG/1; MG/1
1 TABLET, FILM COATED ORAL DAILY
Qty: 90 TABLET | Refills: 3 | Status: SHIPPED | OUTPATIENT
Start: 2021-09-15 | End: 2021-11-17

## 2021-09-15 NOTE — TELEPHONE ENCOUNTER
Order printed and provided to NIVIA additional order printed and signed and faxed to [facility] office for mammogram   # [phone]  Order was faxed as requested.

## 2021-09-20 NOTE — PROGRESS NOTES
Subjective:   Patient ID: Pelon Leal is a 78year old female.     HPI  Here for pe, htn, feels well, no acute complaints  /82   Pulse 78   Temp 96.8 °F (36 °C)   Resp 16   Ht 5' 7\" (1.702 m)   Wt 188 lb (85.3 kg)   SpO2 99%   BMI 29.44 kg/m² Neurological:      Mental Status: She is oriented to person, place, and time.    Psychiatric:         Mood and Affect: Mood normal.         Assessment & Plan:   Essential hypertension  (primary encounter diagnosis)-cont meds  Routine general medical exami

## 2021-11-03 ENCOUNTER — HOSPITAL ENCOUNTER (OUTPATIENT)
Dept: BONE DENSITY | Age: 71
Discharge: HOME | End: 2021-11-03
Attending: INTERNAL MEDICINE
Payer: MEDICARE

## 2021-11-03 DIAGNOSIS — Z78.0 POST-MENOPAUSAL: ICD-10-CM

## 2021-11-03 PROCEDURE — 77080 DXA BONE DENSITY AXIAL: CPT | Performed by: INTERNAL MEDICINE

## 2021-11-17 RX ORDER — METOPROLOL SUCCINATE 50 MG/1
TABLET, EXTENDED RELEASE ORAL
Qty: 90 TABLET | Refills: 0 | Status: SHIPPED | OUTPATIENT
Start: 2021-11-17 | End: 2022-02-02

## 2021-11-17 RX ORDER — IRBESARTAN AND HYDROCHLOROTHIAZIDE 150; 12.5 MG/1; MG/1
TABLET, FILM COATED ORAL
Qty: 90 TABLET | Refills: 0 | Status: SHIPPED | OUTPATIENT
Start: 2021-11-17 | End: 2022-02-02

## 2021-11-17 NOTE — TELEPHONE ENCOUNTER
Last visit- 09/15/2021    Last refill- 09/15/2021 metoprolol succinate 50mg QTY90 3R,  09/15/2021 irbesartan-hydrochlorothiazide 150-12.5mg QTY90 3R    Last labs- 09/02/2021 lipid, cmp, cbc    No future appointments.     Per Protocol- Passed

## 2022-02-02 RX ORDER — METOPROLOL SUCCINATE 50 MG/1
TABLET, EXTENDED RELEASE ORAL
Qty: 90 TABLET | Refills: 0 | Status: SHIPPED | OUTPATIENT
Start: 2022-02-02

## 2022-02-02 RX ORDER — IRBESARTAN AND HYDROCHLOROTHIAZIDE 150; 12.5 MG/1; MG/1
TABLET, FILM COATED ORAL
Qty: 90 TABLET | Refills: 0 | Status: SHIPPED | OUTPATIENT
Start: 2022-02-02

## 2022-04-21 ENCOUNTER — APPOINTMENT (OUTPATIENT)
Dept: MAMMOGRAPHY | Age: 72
End: 2022-04-21

## 2022-04-21 RX ORDER — METOPROLOL SUCCINATE 50 MG/1
TABLET, EXTENDED RELEASE ORAL
Qty: 90 TABLET | Refills: 0 | Status: SHIPPED | OUTPATIENT
Start: 2022-04-21

## 2022-04-21 RX ORDER — IRBESARTAN AND HYDROCHLOROTHIAZIDE 150; 12.5 MG/1; MG/1
TABLET, FILM COATED ORAL
Qty: 90 TABLET | Refills: 0 | Status: SHIPPED | OUTPATIENT
Start: 2022-04-21

## 2022-04-21 NOTE — TELEPHONE ENCOUNTER
Last visit- 09/15/2021 cpe seen by NIVIA    Last refill- 02/02/2022 irbesartan-hydrochlorothiazide 150-12.5mg QTY90 0R,  02/02/2022 metoprolol succinate 50mg QTY90 0R    Last labs- 09/02/2021 lipid, cmp, cbc    No future appointments.     Per Protocol- Passed

## 2022-05-20 ENCOUNTER — IMAGING SERVICES (OUTPATIENT)
Dept: MAMMOGRAPHY | Age: 72
End: 2022-05-20

## 2022-05-20 DIAGNOSIS — Z12.31 ENCOUNTER FOR SCREENING MAMMOGRAM FOR MALIGNANT NEOPLASM OF BREAST: ICD-10-CM

## 2022-05-20 PROCEDURE — 77067 SCR MAMMO BI INCL CAD: CPT | Performed by: RADIOLOGY

## 2022-05-20 PROCEDURE — 77063 BREAST TOMOSYNTHESIS BI: CPT | Performed by: RADIOLOGY

## 2022-05-24 ENCOUNTER — MED REC SCAN ONLY (OUTPATIENT)
Dept: INTERNAL MEDICINE CLINIC | Facility: CLINIC | Age: 72
End: 2022-05-24

## 2022-06-06 ENCOUNTER — IMAGING SERVICES (OUTPATIENT)
Dept: MAMMOGRAPHY | Age: 72
End: 2022-06-06

## 2022-06-06 DIAGNOSIS — R92.8 FOLLOW-UP EXAMINATION OF ABNORMAL MAMMOGRAM: ICD-10-CM

## 2022-06-06 PROCEDURE — 77065 DX MAMMO INCL CAD UNI: CPT | Performed by: RADIOLOGY

## 2022-06-06 PROCEDURE — G0279 TOMOSYNTHESIS, MAMMO: HCPCS | Performed by: RADIOLOGY

## 2022-06-06 PROCEDURE — 76641 ULTRASOUND BREAST COMPLETE: CPT | Performed by: RADIOLOGY

## 2022-06-07 ENCOUNTER — MED REC SCAN ONLY (OUTPATIENT)
Dept: INTERNAL MEDICINE CLINIC | Facility: CLINIC | Age: 72
End: 2022-06-07

## 2022-06-07 NOTE — PROGRESS NOTES
Mammogram result received from [address] [place] [address] Abstracted and placed on JL desk for review. HM updated.

## 2022-06-16 ENCOUNTER — TELEPHONE (OUTPATIENT)
Dept: INTERNAL MEDICINE CLINIC | Facility: CLINIC | Age: 72
End: 2022-06-16

## 2022-06-16 DIAGNOSIS — I10 ESSENTIAL HYPERTENSION: Primary | ICD-10-CM

## 2022-06-16 NOTE — TELEPHONE ENCOUNTER
Future Appointments   Date Time Provider Meghan Carlson   10/5/2022  8:00 AM Nida Godinez MD [facility]     Patient is scheduled for Annual Physical.  Please place orders with [facility]. Patient aware to fast.  No call back required. Patient informed that labs need to be completed no sooner than 2 weeks prior to the appointment.

## 2022-08-10 RX ORDER — IRBESARTAN AND HYDROCHLOROTHIAZIDE 150; 12.5 MG/1; MG/1
1 TABLET, FILM COATED ORAL DAILY
Qty: 90 TABLET | Refills: 0 | Status: SHIPPED | OUTPATIENT
Start: 2022-08-10

## 2022-08-10 RX ORDER — METOPROLOL SUCCINATE 50 MG/1
50 TABLET, EXTENDED RELEASE ORAL DAILY
Qty: 90 TABLET | Refills: 0 | Status: SHIPPED | OUTPATIENT
Start: 2022-08-10

## 2022-08-10 NOTE — TELEPHONE ENCOUNTER
PASSED per protocol, refill sent.     Last PE: 9--JL     Future Appointments   Date Time Provider Meghan Carlson   10/5/2022  8:00 AM Harper Hurd MD [facility]

## 2022-10-05 ENCOUNTER — LAB ENCOUNTER (OUTPATIENT)
Dept: LAB | Age: 72
End: 2022-10-05
Attending: INTERNAL MEDICINE
Payer: MEDICARE

## 2022-10-05 DIAGNOSIS — I10 ESSENTIAL HYPERTENSION: ICD-10-CM

## 2022-10-05 LAB
ALBUMIN SERPL-MCNC: 3.6 G/DL (ref 3.4–5)
ALBUMIN/GLOB SERPL: 1.3 {RATIO} (ref 1–2)
ALP LIVER SERPL-CCNC: 95 U/L
ALT SERPL-CCNC: 36 U/L
ANION GAP SERPL CALC-SCNC: 7 MMOL/L (ref 0–18)
AST SERPL-CCNC: 21 U/L (ref 15–37)
BASOPHILS # BLD AUTO: 0.06 X10(3) UL (ref 0–0.2)
BASOPHILS NFR BLD AUTO: 1 %
BILIRUB SERPL-MCNC: 0.8 MG/DL (ref 0.1–2)
BUN BLD-MCNC: 15 MG/DL (ref 7–18)
BUN/CREAT SERPL: 15.6 (ref 10–20)
CALCIUM BLD-MCNC: 9 MG/DL (ref 8.5–10.1)
CHLORIDE SERPL-SCNC: 110 MMOL/L (ref 98–112)
CHOLEST SERPL-MCNC: 150 MG/DL (ref ?–200)
CO2 SERPL-SCNC: 26 MMOL/L (ref 21–32)
CREAT BLD-MCNC: 0.96 MG/DL
DEPRECATED RDW RBC AUTO: 47.9 FL (ref 35.1–46.3)
EOSINOPHIL # BLD AUTO: 0.18 X10(3) UL (ref 0–0.7)
EOSINOPHIL NFR BLD AUTO: 3 %
ERYTHROCYTE [DISTWIDTH] IN BLOOD BY AUTOMATED COUNT: 13.2 % (ref 11–15)
FASTING PATIENT LIPID ANSWER: YES
FASTING STATUS PATIENT QL REPORTED: YES
GFR SERPLBLD BASED ON 1.73 SQ M-ARVRAT: 64 ML/MIN/1.73M2 (ref 60–?)
GLOBULIN PLAS-MCNC: 2.8 G/DL (ref 2.8–4.4)
GLUCOSE BLD-MCNC: 92 MG/DL (ref 70–99)
HCT VFR BLD AUTO: 41 %
HDLC SERPL-MCNC: 74 MG/DL (ref 40–59)
HGB BLD-MCNC: 13 G/DL
IMM GRANULOCYTES # BLD AUTO: 0.02 X10(3) UL (ref 0–1)
IMM GRANULOCYTES NFR BLD: 0.3 %
LDLC SERPL CALC-MCNC: 64 MG/DL (ref ?–100)
LYMPHOCYTES # BLD AUTO: 1.66 X10(3) UL (ref 1–4)
LYMPHOCYTES NFR BLD AUTO: 28.1 %
MCH RBC QN AUTO: 31.4 PG (ref 26–34)
MCHC RBC AUTO-ENTMCNC: 31.7 G/DL (ref 31–37)
MCV RBC AUTO: 99 FL
MONOCYTES # BLD AUTO: 0.7 X10(3) UL (ref 0.1–1)
MONOCYTES NFR BLD AUTO: 11.8 %
NEUTROPHILS # BLD AUTO: 3.29 X10 (3) UL (ref 1.5–7.7)
NEUTROPHILS # BLD AUTO: 3.29 X10(3) UL (ref 1.5–7.7)
NEUTROPHILS NFR BLD AUTO: 55.8 %
NONHDLC SERPL-MCNC: 76 MG/DL (ref ?–130)
OSMOLALITY SERPL CALC.SUM OF ELEC: 296 MOSM/KG (ref 275–295)
PLATELET # BLD AUTO: 223 10(3)UL (ref 150–450)
POTASSIUM SERPL-SCNC: 3.8 MMOL/L (ref 3.5–5.1)
PROT SERPL-MCNC: 6.4 G/DL (ref 6.4–8.2)
RBC # BLD AUTO: 4.14 X10(6)UL
SODIUM SERPL-SCNC: 143 MMOL/L (ref 136–145)
TRIGL SERPL-MCNC: 61 MG/DL (ref 30–149)
TSI SER-ACNC: 1.94 MIU/ML (ref 0.36–3.74)
VLDLC SERPL CALC-MCNC: 9 MG/DL (ref 0–30)
WBC # BLD AUTO: 5.9 X10(3) UL (ref 4–11)

## 2022-10-05 PROCEDURE — 80061 LIPID PANEL: CPT

## 2022-10-05 PROCEDURE — 80053 COMPREHEN METABOLIC PANEL: CPT

## 2022-10-05 PROCEDURE — 85025 COMPLETE CBC W/AUTO DIFF WBC: CPT

## 2022-10-05 PROCEDURE — 84443 ASSAY THYROID STIM HORMONE: CPT

## 2022-10-18 ENCOUNTER — OFFICE VISIT (OUTPATIENT)
Dept: INTERNAL MEDICINE CLINIC | Facility: CLINIC | Age: 72
End: 2022-10-18
Payer: MEDICARE

## 2022-10-18 VITALS
BODY MASS INDEX: 30.34 KG/M2 | DIASTOLIC BLOOD PRESSURE: 64 MMHG | HEART RATE: 56 BPM | TEMPERATURE: 97 F | SYSTOLIC BLOOD PRESSURE: 132 MMHG | WEIGHT: 191 LBS | HEIGHT: 66.73 IN

## 2022-10-18 DIAGNOSIS — I10 ESSENTIAL HYPERTENSION: ICD-10-CM

## 2022-10-18 DIAGNOSIS — Z00.00 ROUTINE GENERAL MEDICAL EXAMINATION AT A HEALTH CARE FACILITY: Primary | ICD-10-CM

## 2022-10-18 DIAGNOSIS — R42 DIZZINESS: ICD-10-CM

## 2022-10-18 DIAGNOSIS — Z12.11 SCREEN FOR COLON CANCER: ICD-10-CM

## 2022-10-18 PROCEDURE — G0438 PPPS, INITIAL VISIT: HCPCS | Performed by: PHYSICIAN ASSISTANT

## 2022-10-18 RX ORDER — METOPROLOL SUCCINATE 50 MG/1
50 TABLET, EXTENDED RELEASE ORAL DAILY
Qty: 90 TABLET | Refills: 3 | Status: SHIPPED | OUTPATIENT
Start: 2022-10-18

## 2022-10-18 RX ORDER — IRBESARTAN AND HYDROCHLOROTHIAZIDE 150; 12.5 MG/1; MG/1
1 TABLET, FILM COATED ORAL DAILY
Qty: 90 TABLET | Refills: 3 | Status: SHIPPED | OUTPATIENT
Start: 2022-10-18

## 2022-11-07 ENCOUNTER — LAB ENCOUNTER (OUTPATIENT)
Dept: LAB | Age: 72
End: 2022-11-07
Attending: PHYSICIAN ASSISTANT
Payer: MEDICARE

## 2022-11-07 DIAGNOSIS — Z12.11 SCREEN FOR COLON CANCER: ICD-10-CM

## 2022-11-07 PROCEDURE — 82274 ASSAY TEST FOR BLOOD FECAL: CPT

## 2022-11-08 LAB — HEMOCCULT STL QL: NEGATIVE

## 2023-03-13 ENCOUNTER — TELEPHONE (OUTPATIENT)
Dept: INTERNAL MEDICINE CLINIC | Facility: CLINIC | Age: 73
End: 2023-03-13

## 2023-03-13 ENCOUNTER — PATIENT MESSAGE (OUTPATIENT)
Dept: INTERNAL MEDICINE CLINIC | Facility: CLINIC | Age: 73
End: 2023-03-13

## 2023-03-13 NOTE — TELEPHONE ENCOUNTER
From: Meredith Winters  To: Marie Gutierrez PA-C  Sent: 3/13/2023 10:12 AM CDT  Subject: prescription question    If there is a problem renewing the two prescriptions I have requested, let me know. My insurance prescription provider changed on 1/1/23, but the mail order prescription provider is still [facility]. I tried to refill the two prescriptions on their website, but it said I can't refill. The [facility] phone number on my card is [phone]. Thank you!

## 2023-03-14 RX ORDER — IRBESARTAN AND HYDROCHLOROTHIAZIDE 150; 12.5 MG/1; MG/1
1 TABLET, FILM COATED ORAL DAILY
Qty: 90 TABLET | Refills: 3 | Status: SHIPPED | OUTPATIENT
Start: 2023-03-14 | End: 2023-03-20

## 2023-03-14 RX ORDER — METOPROLOL SUCCINATE 50 MG/1
50 TABLET, EXTENDED RELEASE ORAL DAILY
Qty: 90 TABLET | Refills: 3 | Status: SHIPPED | OUTPATIENT
Start: 2023-03-14 | End: 2023-03-20

## 2023-03-20 RX ORDER — IRBESARTAN AND HYDROCHLOROTHIAZIDE 150; 12.5 MG/1; MG/1
1 TABLET, FILM COATED ORAL DAILY
Qty: 90 TABLET | Refills: 3 | Status: SHIPPED | OUTPATIENT
Start: 2023-03-20

## 2023-03-20 RX ORDER — METOPROLOL SUCCINATE 50 MG/1
50 TABLET, EXTENDED RELEASE ORAL DAILY
Qty: 90 TABLET | Refills: 3 | Status: SHIPPED | OUTPATIENT
Start: 2023-03-20

## 2023-03-20 NOTE — TELEPHONE ENCOUNTER
[facility] has not received these scripts, pt verified and spoke to someone. Can we re-send or please call it in for pt.      [address], [address] [facility],[address] AT PORTAL TO [address], [phone], [phone]

## 2023-04-18 ENCOUNTER — APPOINTMENT (OUTPATIENT)
Dept: GENERAL RADIOLOGY | Facility: HOSPITAL | Age: 73
End: 2023-04-18
Attending: EMERGENCY MEDICINE
Payer: MEDICARE

## 2023-04-18 ENCOUNTER — OFFICE VISIT (OUTPATIENT)
Dept: INTERNAL MEDICINE CLINIC | Facility: CLINIC | Age: 73
End: 2023-04-18
Payer: MEDICARE

## 2023-04-18 ENCOUNTER — HOSPITAL ENCOUNTER (EMERGENCY)
Facility: HOSPITAL | Age: 73
Discharge: HOME | End: 2023-04-18
Attending: EMERGENCY MEDICINE
Payer: MEDICARE

## 2023-04-18 ENCOUNTER — TELEPHONE (OUTPATIENT)
Dept: INTERNAL MEDICINE CLINIC | Facility: CLINIC | Age: 73
End: 2023-04-18

## 2023-04-18 ENCOUNTER — TELEPHONE (OUTPATIENT)
Dept: CASE MANAGEMENT | Facility: HOSPITAL | Age: 73
End: 2023-04-18

## 2023-04-18 VITALS
DIASTOLIC BLOOD PRESSURE: 82 MMHG | WEIGHT: 191 LBS | SYSTOLIC BLOOD PRESSURE: 144 MMHG | HEART RATE: 84 BPM | HEIGHT: 66.73 IN | BODY MASS INDEX: 30.34 KG/M2 | TEMPERATURE: 98 F

## 2023-04-18 VITALS
HEIGHT: 66 IN | RESPIRATION RATE: 19 BRPM | WEIGHT: 185 LBS | SYSTOLIC BLOOD PRESSURE: 124 MMHG | OXYGEN SATURATION: 98 % | BODY MASS INDEX: 29.73 KG/M2 | HEART RATE: 71 BPM | DIASTOLIC BLOOD PRESSURE: 89 MMHG

## 2023-04-18 DIAGNOSIS — I48.91 NEW ONSET ATRIAL FIBRILLATION (HCC): ICD-10-CM

## 2023-04-18 DIAGNOSIS — I49.9 IRREGULAR HEARTBEAT: Primary | ICD-10-CM

## 2023-04-18 DIAGNOSIS — R53.83 OTHER FATIGUE: ICD-10-CM

## 2023-04-18 DIAGNOSIS — R53.81 MALAISE: ICD-10-CM

## 2023-04-18 DIAGNOSIS — I48.91 ATRIAL FIBRILLATION WITH RAPID VENTRICULAR RESPONSE (HCC): Primary | ICD-10-CM

## 2023-04-18 DIAGNOSIS — I10 ESSENTIAL HYPERTENSION: ICD-10-CM

## 2023-04-18 LAB
ALBUMIN SERPL-MCNC: 3.8 G/DL (ref 3.4–5)
ALBUMIN/GLOB SERPL: 1.1 {RATIO} (ref 1–2)
ALP LIVER SERPL-CCNC: 106 U/L
ALT SERPL-CCNC: 37 U/L
ANION GAP SERPL CALC-SCNC: 6 MMOL/L (ref 0–18)
APTT PPP: 27.9 SECONDS (ref 23.3–35.6)
AST SERPL-CCNC: 26 U/L (ref 15–37)
ATRIAL RATE: 375 BPM
BASOPHILS # BLD AUTO: 0.06 X10(3) UL (ref 0–0.2)
BASOPHILS NFR BLD AUTO: 0.7 %
BILIRUB SERPL-MCNC: 0.5 MG/DL (ref 0.1–2)
BUN BLD-MCNC: 22 MG/DL (ref 7–18)
CALCIUM BLD-MCNC: 9.4 MG/DL (ref 8.5–10.1)
CHLORIDE SERPL-SCNC: 109 MMOL/L (ref 98–112)
CO2 SERPL-SCNC: 25 MMOL/L (ref 21–32)
CREAT BLD-MCNC: 1.04 MG/DL
EOSINOPHIL # BLD AUTO: 0.15 X10(3) UL (ref 0–0.7)
EOSINOPHIL NFR BLD AUTO: 1.8 %
ERYTHROCYTE [DISTWIDTH] IN BLOOD BY AUTOMATED COUNT: 11.9 %
GFR SERPLBLD BASED ON 1.73 SQ M-ARVRAT: 58 ML/MIN/1.73M2 (ref 60–?)
GLOBULIN PLAS-MCNC: 3.5 G/DL (ref 2.8–4.4)
GLUCOSE BLD-MCNC: 109 MG/DL (ref 70–99)
HCT VFR BLD AUTO: 44.5 %
HGB BLD-MCNC: 14.9 G/DL
IMM GRANULOCYTES # BLD AUTO: 0.05 X10(3) UL (ref 0–1)
IMM GRANULOCYTES NFR BLD: 0.6 %
INR BLD: 1 (ref 0.85–1.16)
LYMPHOCYTES # BLD AUTO: 2.79 X10(3) UL (ref 1–4)
LYMPHOCYTES NFR BLD AUTO: 33.7 %
MCH RBC QN AUTO: 30.8 PG (ref 26–34)
MCHC RBC AUTO-ENTMCNC: 33.5 G/DL (ref 31–37)
MCV RBC AUTO: 92.1 FL
MONOCYTES # BLD AUTO: 0.77 X10(3) UL (ref 0.1–1)
MONOCYTES NFR BLD AUTO: 9.3 %
NEUTROPHILS # BLD AUTO: 4.45 X10 (3) UL (ref 1.5–7.7)
NEUTROPHILS # BLD AUTO: 4.45 X10(3) UL (ref 1.5–7.7)
NEUTROPHILS NFR BLD AUTO: 53.9 %
OSMOLALITY SERPL CALC.SUM OF ELEC: 294 MOSM/KG (ref 275–295)
PLATELET # BLD AUTO: 249 10(3)UL (ref 150–450)
POTASSIUM SERPL-SCNC: 3.6 MMOL/L (ref 3.5–5.1)
PROT SERPL-MCNC: 7.3 G/DL (ref 6.4–8.2)
PROTHROMBIN TIME: 13.2 SECONDS (ref 11.6–14.8)
Q-T INTERVAL: 270 MS
Q-T INTERVAL: 368 MS
QRS DURATION: 74 MS
QRS DURATION: 78 MS
QTC CALCULATION (BEZET): 406 MS
QTC CALCULATION (BEZET): 447 MS
R AXIS: 30 DEGREES
R AXIS: 32 DEGREES
RBC # BLD AUTO: 4.83 X10(6)UL
SODIUM SERPL-SCNC: 140 MMOL/L (ref 136–145)
T AXIS: 224 DEGREES
T AXIS: 25 DEGREES
TROPONIN I HIGH SENSITIVITY: 6 NG/L
TSI SER-ACNC: 1.54 MIU/ML (ref 0.36–3.74)
VENTRICULAR RATE: 136 BPM
VENTRICULAR RATE: 89 BPM
WBC # BLD AUTO: 8.3 X10(3) UL (ref 4–11)

## 2023-04-18 PROCEDURE — 85025 COMPLETE CBC W/AUTO DIFF WBC: CPT | Performed by: EMERGENCY MEDICINE

## 2023-04-18 PROCEDURE — 93000 ELECTROCARDIOGRAM COMPLETE: CPT | Performed by: PHYSICIAN ASSISTANT

## 2023-04-18 PROCEDURE — 99214 OFFICE O/P EST MOD 30 MIN: CPT | Performed by: PHYSICIAN ASSISTANT

## 2023-04-18 PROCEDURE — 80053 COMPREHEN METABOLIC PANEL: CPT | Performed by: EMERGENCY MEDICINE

## 2023-04-18 PROCEDURE — 71045 X-RAY EXAM CHEST 1 VIEW: CPT | Performed by: EMERGENCY MEDICINE

## 2023-04-18 PROCEDURE — 85610 PROTHROMBIN TIME: CPT | Performed by: EMERGENCY MEDICINE

## 2023-04-18 PROCEDURE — 96365 THER/PROPH/DIAG IV INF INIT: CPT

## 2023-04-18 PROCEDURE — 93010 ELECTROCARDIOGRAM REPORT: CPT

## 2023-04-18 PROCEDURE — 96366 THER/PROPH/DIAG IV INF ADDON: CPT

## 2023-04-18 PROCEDURE — 84484 ASSAY OF TROPONIN QUANT: CPT | Performed by: EMERGENCY MEDICINE

## 2023-04-18 PROCEDURE — 99291 CRITICAL CARE FIRST HOUR: CPT

## 2023-04-18 PROCEDURE — 93005 ELECTROCARDIOGRAM TRACING: CPT

## 2023-04-18 PROCEDURE — 84443 ASSAY THYROID STIM HORMONE: CPT | Performed by: EMERGENCY MEDICINE

## 2023-04-18 PROCEDURE — 99285 EMERGENCY DEPT VISIT HI MDM: CPT

## 2023-04-18 PROCEDURE — 85730 THROMBOPLASTIN TIME PARTIAL: CPT | Performed by: EMERGENCY MEDICINE

## 2023-04-18 RX ORDER — METOPROLOL SUCCINATE 100 MG/1
100 TABLET, EXTENDED RELEASE ORAL DAILY
Qty: 30 TABLET | Refills: 0 | Status: SHIPPED | OUTPATIENT
Start: 2023-04-18

## 2023-04-18 RX ORDER — METOPROLOL SUCCINATE 100 MG/1
100 TABLET, EXTENDED RELEASE ORAL DAILY
Qty: 30 TABLET | Refills: 0 | Status: SHIPPED | OUTPATIENT
Start: 2023-04-18 | End: 2023-04-18

## 2023-04-18 NOTE — CM/SW NOTE
Pharmacy called. Patient does not have insurance. Pharmacy is calling to see if we have a coupon card patient could use. All information provided to [place] for the 30 day free trial coupon card.

## 2023-04-18 NOTE — ED QUICK NOTES
Pt reevaluated by er physician. Pt informed of all her test report and plan of care.  Verbalizing understanding

## 2023-04-18 NOTE — TELEPHONE ENCOUNTER
Received fax with attached request to verify pt information for mail order. Printed and faxed back face sheet to [phone]. Confirmation received. Sent paper to scan.

## 2023-04-18 NOTE — ED INITIAL ASSESSMENT (HPI)
States she has not been feeling well this last week. Fatigued/tired. Went to see PMD and EKG showed new onset Afib. Denies CP/SOB.

## 2023-04-19 ENCOUNTER — PATIENT MESSAGE (OUTPATIENT)
Dept: INTERNAL MEDICINE CLINIC | Facility: CLINIC | Age: 73
End: 2023-04-19

## 2023-04-21 ENCOUNTER — OFFICE VISIT (OUTPATIENT)
Dept: INTERNAL MEDICINE CLINIC | Facility: CLINIC | Age: 73
End: 2023-04-21
Payer: MEDICARE

## 2023-04-21 VITALS
BODY MASS INDEX: 30.86 KG/M2 | WEIGHT: 192 LBS | HEIGHT: 66 IN | HEART RATE: 84 BPM | DIASTOLIC BLOOD PRESSURE: 84 MMHG | SYSTOLIC BLOOD PRESSURE: 118 MMHG | TEMPERATURE: 98 F

## 2023-04-21 DIAGNOSIS — I48.91 ATRIAL FIBRILLATION WITH RAPID VENTRICULAR RESPONSE (HCC): ICD-10-CM

## 2023-04-21 DIAGNOSIS — I10 ESSENTIAL HYPERTENSION: Primary | ICD-10-CM

## 2023-04-21 PROCEDURE — 99214 OFFICE O/P EST MOD 30 MIN: CPT | Performed by: PHYSICIAN ASSISTANT

## 2023-04-21 RX ORDER — IRBESARTAN 150 MG/1
150 TABLET ORAL DAILY
Qty: 90 TABLET | Refills: 1 | Status: SHIPPED | OUTPATIENT
Start: 2023-04-21

## 2023-04-22 RX ORDER — IRBESARTAN AND HYDROCHLOROTHIAZIDE 150; 12.5 MG/1; MG/1
TABLET, FILM COATED ORAL
Qty: 30 TABLET | Refills: 0 | OUTPATIENT
Start: 2023-04-22

## 2023-04-22 RX ORDER — METOPROLOL SUCCINATE 50 MG/1
TABLET, EXTENDED RELEASE ORAL
Qty: 30 TABLET | Refills: 0 | OUTPATIENT
Start: 2023-04-22

## 2023-04-27 ENCOUNTER — APPOINTMENT (OUTPATIENT)
Dept: GENERAL RADIOLOGY | Facility: HOSPITAL | Age: 73
End: 2023-04-27
Attending: EMERGENCY MEDICINE
Payer: MEDICARE

## 2023-04-27 ENCOUNTER — HOSPITAL ENCOUNTER (INPATIENT)
Facility: HOSPITAL | Age: 73
LOS: 2 days | Discharge: HOME | End: 2023-04-29
Attending: EMERGENCY MEDICINE | Admitting: INTERNAL MEDICINE
Payer: MEDICARE

## 2023-04-27 ENCOUNTER — TELEPHONE (OUTPATIENT)
Dept: INTERNAL MEDICINE CLINIC | Facility: CLINIC | Age: 73
End: 2023-04-27

## 2023-04-27 ENCOUNTER — OFFICE VISIT (OUTPATIENT)
Dept: INTERNAL MEDICINE CLINIC | Facility: CLINIC | Age: 73
End: 2023-04-27
Payer: MEDICARE

## 2023-04-27 VITALS
HEART RATE: 83 BPM | OXYGEN SATURATION: 97 % | HEIGHT: 66 IN | TEMPERATURE: 98 F | RESPIRATION RATE: 16 BRPM | BODY MASS INDEX: 31.34 KG/M2 | WEIGHT: 195 LBS | DIASTOLIC BLOOD PRESSURE: 80 MMHG | SYSTOLIC BLOOD PRESSURE: 140 MMHG

## 2023-04-27 DIAGNOSIS — R07.89 CHEST PRESSURE: Primary | ICD-10-CM

## 2023-04-27 DIAGNOSIS — R07.9 ACUTE CHEST PAIN: ICD-10-CM

## 2023-04-27 DIAGNOSIS — I10 ESSENTIAL HYPERTENSION: ICD-10-CM

## 2023-04-27 DIAGNOSIS — I48.91 ATRIAL FIBRILLATION WITH RAPID VENTRICULAR RESPONSE (HCC): ICD-10-CM

## 2023-04-27 DIAGNOSIS — I48.91 ATRIAL FIBRILLATION WITH CONTROLLED VENTRICULAR RESPONSE (HCC): Primary | ICD-10-CM

## 2023-04-27 LAB
ALBUMIN SERPL-MCNC: 3.9 G/DL (ref 3.4–5)
ALBUMIN/GLOB SERPL: 1.2 {RATIO} (ref 1–2)
ALP LIVER SERPL-CCNC: 116 U/L
ALT SERPL-CCNC: 130 U/L
ANION GAP SERPL CALC-SCNC: 2 MMOL/L (ref 0–18)
ANION GAP SERPL CALC-SCNC: 3 MMOL/L (ref 0–18)
APTT PPP: 31.4 SECONDS (ref 23.3–35.6)
AST SERPL-CCNC: 107 U/L (ref 15–37)
ATRIAL RATE: 94 BPM
BASOPHILS # BLD AUTO: 0.1 X10(3) UL (ref 0–0.2)
BASOPHILS NFR BLD AUTO: 1.1 %
BILIRUB SERPL-MCNC: 0.8 MG/DL (ref 0.1–2)
BUN BLD-MCNC: 15 MG/DL (ref 7–18)
BUN BLD-MCNC: 16 MG/DL (ref 7–18)
CALCIUM BLD-MCNC: 9.2 MG/DL (ref 8.5–10.1)
CALCIUM BLD-MCNC: 9.2 MG/DL (ref 8.5–10.1)
CHLORIDE SERPL-SCNC: 115 MMOL/L (ref 98–112)
CHLORIDE SERPL-SCNC: 116 MMOL/L (ref 98–112)
CO2 SERPL-SCNC: 23 MMOL/L (ref 21–32)
CO2 SERPL-SCNC: 24 MMOL/L (ref 21–32)
CREAT BLD-MCNC: 0.91 MG/DL
CREAT BLD-MCNC: 0.96 MG/DL
EOSINOPHIL # BLD AUTO: 0.1 X10(3) UL (ref 0–0.7)
EOSINOPHIL NFR BLD AUTO: 1.1 %
ERYTHROCYTE [DISTWIDTH] IN BLOOD BY AUTOMATED COUNT: 12.1 %
ERYTHROCYTE [DISTWIDTH] IN BLOOD BY AUTOMATED COUNT: 12.2 %
GFR SERPLBLD BASED ON 1.73 SQ M-ARVRAT: 64 ML/MIN/1.73M2 (ref 60–?)
GFR SERPLBLD BASED ON 1.73 SQ M-ARVRAT: 68 ML/MIN/1.73M2 (ref 60–?)
GLOBULIN PLAS-MCNC: 3.2 G/DL (ref 2.8–4.4)
GLUCOSE BLD-MCNC: 81 MG/DL (ref 70–99)
GLUCOSE BLD-MCNC: 84 MG/DL (ref 70–99)
HCT VFR BLD AUTO: 41.1 %
HCT VFR BLD AUTO: 42.1 %
HGB BLD-MCNC: 13.9 G/DL
HGB BLD-MCNC: 14.3 G/DL
IMM GRANULOCYTES # BLD AUTO: 0.03 X10(3) UL (ref 0–1)
IMM GRANULOCYTES NFR BLD: 0.3 %
LYMPHOCYTES # BLD AUTO: 2.28 X10(3) UL (ref 1–4)
LYMPHOCYTES NFR BLD AUTO: 25.6 %
MCH RBC QN AUTO: 31.6 PG (ref 26–34)
MCH RBC QN AUTO: 31.9 PG (ref 26–34)
MCHC RBC AUTO-ENTMCNC: 33.8 G/DL (ref 31–37)
MCHC RBC AUTO-ENTMCNC: 34 G/DL (ref 31–37)
MCV RBC AUTO: 93.4 FL
MCV RBC AUTO: 94 FL
MONOCYTES # BLD AUTO: 0.72 X10(3) UL (ref 0.1–1)
MONOCYTES NFR BLD AUTO: 8.1 %
NEUTROPHILS # BLD AUTO: 5.67 X10 (3) UL (ref 1.5–7.7)
NEUTROPHILS # BLD AUTO: 5.67 X10(3) UL (ref 1.5–7.7)
NEUTROPHILS NFR BLD AUTO: 63.8 %
OSMOLALITY SERPL CALC.SUM OF ELEC: 292 MOSM/KG (ref 275–295)
OSMOLALITY SERPL CALC.SUM OF ELEC: 294 MOSM/KG (ref 275–295)
PLATELET # BLD AUTO: 236 10(3)UL (ref 150–450)
PLATELET # BLD AUTO: 247 10(3)UL (ref 150–450)
POTASSIUM SERPL-SCNC: 3.9 MMOL/L (ref 3.5–5.1)
POTASSIUM SERPL-SCNC: 4.3 MMOL/L (ref 3.5–5.1)
PROT SERPL-MCNC: 7.1 G/DL (ref 6.4–8.2)
Q-T INTERVAL: 378 MS
QRS DURATION: 78 MS
QTC CALCULATION (BEZET): 446 MS
R AXIS: 26 DEGREES
RBC # BLD AUTO: 4.4 X10(6)UL
RBC # BLD AUTO: 4.48 X10(6)UL
SODIUM SERPL-SCNC: 141 MMOL/L (ref 136–145)
SODIUM SERPL-SCNC: 142 MMOL/L (ref 136–145)
T AXIS: -8 DEGREES
TROPONIN I HIGH SENSITIVITY: 6 NG/L
TROPONIN I HIGH SENSITIVITY: 7 NG/L
VENTRICULAR RATE: 84 BPM
WBC # BLD AUTO: 8.9 X10(3) UL (ref 4–11)
WBC # BLD AUTO: 9.5 X10(3) UL (ref 4–11)

## 2023-04-27 PROCEDURE — 99214 OFFICE O/P EST MOD 30 MIN: CPT | Performed by: PHYSICIAN ASSISTANT

## 2023-04-27 PROCEDURE — 99223 1ST HOSP IP/OBS HIGH 75: CPT | Performed by: HOSPITALIST

## 2023-04-27 PROCEDURE — 71045 X-RAY EXAM CHEST 1 VIEW: CPT | Performed by: EMERGENCY MEDICINE

## 2023-04-27 PROCEDURE — 93000 ELECTROCARDIOGRAM COMPLETE: CPT | Performed by: PHYSICIAN ASSISTANT

## 2023-04-27 RX ORDER — HEPARIN SODIUM AND DEXTROSE 10000; 5 [USP'U]/100ML; G/100ML
1000 INJECTION INTRAVENOUS ONCE
Status: COMPLETED | OUTPATIENT
Start: 2023-04-27 | End: 2023-04-28

## 2023-04-27 RX ORDER — SODIUM CHLORIDE 9 MG/ML
INJECTION, SOLUTION INTRAVENOUS
Status: COMPLETED | OUTPATIENT
Start: 2023-04-28 | End: 2023-04-28

## 2023-04-27 RX ORDER — HEPARIN SODIUM AND DEXTROSE 10000; 5 [USP'U]/100ML; G/100ML
INJECTION INTRAVENOUS CONTINUOUS
Status: DISCONTINUED | OUTPATIENT
Start: 2023-04-28 | End: 2023-04-29

## 2023-04-27 RX ORDER — LOSARTAN POTASSIUM 50 MG/1
50 TABLET ORAL DAILY
Status: DISCONTINUED | OUTPATIENT
Start: 2023-04-28 | End: 2023-04-29

## 2023-04-27 RX ORDER — ASPIRIN 81 MG/1
81 TABLET, CHEWABLE ORAL DAILY
Status: DISCONTINUED | OUTPATIENT
Start: 2023-04-27 | End: 2023-04-28 | Stop reason: HOSPADM

## 2023-04-27 RX ORDER — ONDANSETRON 2 MG/ML
4 INJECTION INTRAMUSCULAR; INTRAVENOUS EVERY 6 HOURS PRN
Status: DISCONTINUED | OUTPATIENT
Start: 2023-04-27 | End: 2023-04-29

## 2023-04-27 RX ORDER — METOPROLOL SUCCINATE 100 MG/1
100 TABLET, EXTENDED RELEASE ORAL DAILY
Status: DISCONTINUED | OUTPATIENT
Start: 2023-04-28 | End: 2023-04-29

## 2023-04-27 RX ORDER — MELATONIN
3 NIGHTLY PRN
Status: DISCONTINUED | OUTPATIENT
Start: 2023-04-27 | End: 2023-04-29

## 2023-04-27 RX ORDER — HEPARIN SODIUM 1000 [USP'U]/ML
5000 INJECTION, SOLUTION INTRAVENOUS; SUBCUTANEOUS ONCE
Status: COMPLETED | OUTPATIENT
Start: 2023-04-27 | End: 2023-04-27

## 2023-04-27 RX ORDER — ASPIRIN 325 MG
325 TABLET ORAL DAILY
Status: DISCONTINUED | OUTPATIENT
Start: 2023-04-27 | End: 2023-04-29

## 2023-04-27 NOTE — ED QUICK NOTES
Orders for admission, patient is aware of plan and ready to go upstairs. Any questions, please call ED RN Matthew Pac at extension [phone].      Patient Covid vaccination status: Fully vaccinated     COVID Test Ordered in ED: None    COVID Suspicion at Admission: N/A    Running Infusions:  heparin    Mental Status/LOC at time of transport: A/Ox4    Other pertinent information:   CIWA score: N/A   NIH score:  N/A

## 2023-04-28 ENCOUNTER — EXTERNAL RECORD (OUTPATIENT)
Dept: HEALTH INFORMATION MANAGEMENT | Facility: OTHER | Age: 73
End: 2023-04-28

## 2023-04-28 ENCOUNTER — APPOINTMENT (OUTPATIENT)
Dept: INTERVENTIONAL RADIOLOGY/VASCULAR | Facility: HOSPITAL | Age: 73
End: 2023-04-28
Attending: NURSE PRACTITIONER
Payer: MEDICARE

## 2023-04-28 LAB
APTT PPP: 155.9 SECONDS (ref 23.3–35.6)
APTT PPP: 62.9 SECONDS (ref 23.3–35.6)
CHOLEST SERPL-MCNC: 115 MG/DL (ref ?–200)
HDLC SERPL-MCNC: 63 MG/DL (ref 40–59)
LDLC SERPL CALC-MCNC: 40 MG/DL (ref ?–100)
NONHDLC SERPL-MCNC: 52 MG/DL (ref ?–130)
PLATELET # BLD AUTO: 216 10(3)UL (ref 150–450)
Q-T INTERVAL: 292 MS
QRS DURATION: 76 MS
QTC CALCULATION (BEZET): 382 MS
R AXIS: 26 DEGREES
T AXIS: -60 DEGREES
TRIGL SERPL-MCNC: 50 MG/DL (ref 30–149)
TROPONIN I HIGH SENSITIVITY: 7 NG/L
VENTRICULAR RATE: 103 BPM
VLDLC SERPL CALC-MCNC: 7 MG/DL (ref 0–30)

## 2023-04-28 PROCEDURE — 99232 SBSQ HOSP IP/OBS MODERATE 35: CPT | Performed by: HOSPITALIST

## 2023-04-28 PROCEDURE — B2111ZZ FLUOROSCOPY OF MULTIPLE CORONARY ARTERIES USING LOW OSMOLAR CONTRAST: ICD-10-PCS | Performed by: INTERNAL MEDICINE

## 2023-04-28 PROCEDURE — 4A023N7 MEASUREMENT OF CARDIAC SAMPLING AND PRESSURE, LEFT HEART, PERCUTANEOUS APPROACH: ICD-10-PCS | Performed by: INTERNAL MEDICINE

## 2023-04-28 PROCEDURE — B2151ZZ FLUOROSCOPY OF LEFT HEART USING LOW OSMOLAR CONTRAST: ICD-10-PCS | Performed by: INTERNAL MEDICINE

## 2023-04-28 RX ORDER — HEPARIN SODIUM 5000 [USP'U]/ML
INJECTION, SOLUTION INTRAVENOUS; SUBCUTANEOUS
Status: COMPLETED
Start: 2023-04-28 | End: 2023-04-28

## 2023-04-28 RX ORDER — LIDOCAINE HYDROCHLORIDE 10 MG/ML
INJECTION, SOLUTION EPIDURAL; INFILTRATION; INTRACAUDAL; PERINEURAL
Status: COMPLETED
Start: 2023-04-28 | End: 2023-04-28

## 2023-04-28 RX ORDER — MIDAZOLAM HYDROCHLORIDE 1 MG/ML
INJECTION INTRAMUSCULAR; INTRAVENOUS
Status: COMPLETED
Start: 2023-04-28 | End: 2023-04-28

## 2023-04-28 RX ORDER — VERAPAMIL HYDROCHLORIDE 2.5 MG/ML
INJECTION, SOLUTION INTRAVENOUS
Status: COMPLETED
Start: 2023-04-28 | End: 2023-04-28

## 2023-04-28 RX ORDER — FUROSEMIDE 10 MG/ML
40 INJECTION INTRAMUSCULAR; INTRAVENOUS
Status: DISCONTINUED | OUTPATIENT
Start: 2023-04-28 | End: 2023-04-29

## 2023-04-28 RX ORDER — ASPIRIN 325 MG
325 TABLET ORAL DAILY
Qty: 30 TABLET | Refills: 0 | Status: SHIPPED | OUTPATIENT
Start: 2023-04-29 | End: 2023-04-28

## 2023-04-28 NOTE — PLAN OF CARE
Denies c/o pain, or malaise. Describes intermittent chest pressure that occasionally comes on and leaves quickly. Had a radial approach cath procedure. Right wrist CDI, no hematoma, or oozing noted. Remains in controlled AFIB, HRs in the 70s mostly. Lungs clear on RA. Abdomen soft and non tender to touch with active bowel sounds in all four quadrants. Tolerating all medications well with no adverse effects. All needs met by staff. Will continue to monitor.       Problem: Patient/Family Goals  Goal: Patient/Family Long Term Goal  Description: Patient's Long Term Goal:     Interventions:  -   - See additional Care Plan goals for specific interventions  Outcome: Progressing  Goal: Patient/Family Short Term Goal  Description: Patient's Short Term Goal:     Interventions:   -   - See additional Care Plan goals for specific interventions  Outcome: Progressing

## 2023-04-28 NOTE — PROGRESS NOTES
Received from cath lab radial approach with TR band. Pt. Is on bedrest with 45-90 degree angle. Compliant with not bending the wrist.  Pulse ox applied to the limb where the cath was performed. Limb restrictions applied to the extremity used for cath. Vital signs and wrist assessment every 15 minutes for one hour, every 30 minutes for one hour, and one hour check in 2 consecutive hours. Removing air from TR band appropriately per protocol. Wrist site has no signs of bleeding, hematoma, or oozing at the site.

## 2023-04-28 NOTE — PLAN OF CARE
Admitted to CTU 7 at 2000  oriented to room  Admission navigator complete    A&O x 4  RA  Tele- Afib  Denies pain  Heparin gtt infusing at 7.5 per Afib protocol   NPO at midnight for angio   IV fluids infusing per orders  Patient resting comfortably  Call light in reach  Will continue to monitor

## 2023-04-28 NOTE — PROGRESS NOTES
RN called for clarification on aspirin order and clarification on heparin. Patient has two orders for aspirin 81 mg and 325 mg. Advised RN from cardiology standpoint, give aspirin 81 mg. RN also asking for clarification on stopping heparin gtt for possible angiogram. Advised RN to continue heparin gtt for now.

## 2023-04-28 NOTE — PROCEDURES
[address]    Cardiac Catheterization Note    Primary Proceduralist: Sindy Denny MD  Procedure Performed: [facility]  Date of Procedure: 4/28/2023   Indication: CP  Estimated blood loss: 10cc  Specimens: None    Consent obtained from the patient and documented in the paper chart, unless verbally obtained in an emergency setting. The benefits and risk of the procedure, including but not limited to infection, bleeding, myocardial infarction, stroke, vascular injury, emergency surgery, renal failure requiring dialysis and death were discussed with the patient. These complications occur in approximately 1-2% of elective procedures, but the risk may be significantly elevated in the setting of acute coronary syndrome, electrical or hemodynamic instability, multivessel disease, reduced LVEF or . The patient consented to any additional procedures performed emergently in order to address a complication or prevent medical deterioration. Viable alternatives were explained to the patient, including continuing medical therapy, with the risks incurred along that course. Procedure/Technique:    Lidocaine 1% was administered locally. Access of right radial artery obtained using Seldinger technique. Ultrasound was not used. 6 Fr Sheath was inserted. J-wire advanced into the aorta under fluoroscopy. Left coronary system engaged using 6 Fr TIG. Selective angiogram performed. Right coronary system engaged using 6 Fr TIG. Selective angiogram performed. 6 Fr pigtail Catheter advanced into the LV. Hemodynamics were obtained. Coronary Angiogram Findings:  1. LM: Large caliber artery giving rise to LAD & LCX. No significant stenosis. 2. LAD: Large caliber artery giving rise to diagonal and septal branches. No significant stenosis. Significant tortuosity. 3. LCX: Medium to large caliber artery giving rise to OM and LPL branches. No significant stenosis. Significant tortuosity.   4. RCA: Medium caliber artery giving rise to acute marginal branches, and bifurcates into RPDA. Co-dominant vessel. No significant stenosis. .    Hemodynamics:  /20, LVEDP 10-12  /87, mean 109  No significant gradient upon Ao-LV pullback  LVEF 70%    Intervention:  None    Monitored sedation administered by the cath lab RN, and supervised throughout the procedure by myself. Total time 10 minutes. Closure: Radial band. No immediate complications. A/P:  1. No significant stenosis angiographically of LM, LAD, LCX, RCA  2. Co-dominant circulation  3. Significant coronary artery tortuosity, which is suggestive of long standing hypertensive disease. 4. LVEDP 10-12. LVEF 70%  5.  Recommend medical therapy      Mikie Dye MD  Interventional Cardiology  [address] [place] [address]

## 2023-04-29 VITALS
RESPIRATION RATE: 19 BRPM | SYSTOLIC BLOOD PRESSURE: 121 MMHG | HEART RATE: 108 BPM | TEMPERATURE: 98 F | BODY MASS INDEX: 31 KG/M2 | WEIGHT: 195 LBS | DIASTOLIC BLOOD PRESSURE: 75 MMHG | OXYGEN SATURATION: 98 %

## 2023-04-29 LAB — PLATELET # BLD AUTO: 219 10(3)UL (ref 150–450)

## 2023-04-29 RX ORDER — IRBESARTAN AND HYDROCHLOROTHIAZIDE 150; 12.5 MG/1; MG/1
1 TABLET, FILM COATED ORAL DAILY
Refills: 0 | Status: SHIPPED | COMMUNITY
Start: 2023-04-29

## 2023-04-29 NOTE — PROGRESS NOTES
Assumed care @ 1930. Pt a/o x4, VSS. Tele Afib. HR mostly in 80-90's overnight. No acute respiratory distress noted. Denies any pain. Pt ambulated. Rt wrist access site c/d/i. Plan for DC today. No other complaints made. Will continue to monitor.

## 2023-04-29 NOTE — PLAN OF CARE
Pt discharged to home in stable condition. Discharge paperwork, instructions, medications and f/u appts provided to the pt, pt verbalized understanding. CTU7 staff wheeled the pt down to the [facility] main entrance via wc and was picked up by her .

## 2023-05-01 ENCOUNTER — PATIENT OUTREACH (OUTPATIENT)
Dept: CASE MANAGEMENT | Age: 73
End: 2023-05-01

## 2023-05-01 ENCOUNTER — EXTERNAL RECORD (OUTPATIENT)
Dept: HEALTH INFORMATION MANAGEMENT | Facility: OTHER | Age: 73
End: 2023-05-01

## 2023-05-01 ENCOUNTER — TELEPHONE (OUTPATIENT)
Dept: INTERNAL MEDICINE CLINIC | Facility: CLINIC | Age: 73
End: 2023-05-01

## 2023-05-01 DIAGNOSIS — Z02.9 ENCOUNTERS FOR ADMINISTRATIVE PURPOSE: ICD-10-CM

## 2023-05-01 PROCEDURE — 1111F DSCHRG MED/CURRENT MED MERGE: CPT

## 2023-05-01 NOTE — TELEPHONE ENCOUNTER
NEEL, Spoke to pt for TCM today. Pt states that she is following up with Dr. Yana Loza today and will call tomorrow to schedule appt with PCP. TCM/HFU appt recommended by 5/13/2023 as pt is a moderate risk for readmission.       BOOK BY DATE (last date for TCM): 5/13/2023

## 2023-05-04 NOTE — TELEPHONE ENCOUNTER
Future Appointments   Date Time Provider Meghan Carlson   5/9/2023 11:00 AM Zac Odonnell.GRACIELA [facility]

## 2023-05-08 RX ORDER — IRBESARTAN AND HYDROCHLOROTHIAZIDE 150; 12.5 MG/1; MG/1
TABLET, FILM COATED ORAL
Qty: 30 TABLET | Refills: 0 | OUTPATIENT
Start: 2023-05-08

## 2023-05-09 ENCOUNTER — OFFICE VISIT (OUTPATIENT)
Dept: INTERNAL MEDICINE CLINIC | Facility: CLINIC | Age: 73
End: 2023-05-09
Payer: MEDICARE

## 2023-05-09 VITALS
HEART RATE: 82 BPM | TEMPERATURE: 98 F | HEIGHT: 66 IN | BODY MASS INDEX: 30.7 KG/M2 | SYSTOLIC BLOOD PRESSURE: 110 MMHG | DIASTOLIC BLOOD PRESSURE: 74 MMHG | WEIGHT: 191 LBS

## 2023-05-09 DIAGNOSIS — I48.91 ATRIAL FIBRILLATION, UNSPECIFIED TYPE (HCC): ICD-10-CM

## 2023-05-09 DIAGNOSIS — Z09 HOSPITAL DISCHARGE FOLLOW-UP: Primary | ICD-10-CM

## 2023-05-09 DIAGNOSIS — I10 ESSENTIAL HYPERTENSION: ICD-10-CM

## 2023-05-09 PROCEDURE — 1111F DSCHRG MED/CURRENT MED MERGE: CPT | Performed by: PHYSICIAN ASSISTANT

## 2023-05-09 PROCEDURE — 99495 TRANSJ CARE MGMT MOD F2F 14D: CPT | Performed by: PHYSICIAN ASSISTANT

## 2023-05-14 RX ORDER — METOPROLOL SUCCINATE 100 MG/1
100 TABLET, EXTENDED RELEASE ORAL DAILY
Qty: 90 TABLET | Refills: 1 | Status: SHIPPED | OUTPATIENT
Start: 2023-05-14

## 2023-05-14 RX ORDER — METOPROLOL SUCCINATE 50 MG/1
TABLET, EXTENDED RELEASE ORAL
Qty: 30 TABLET | Refills: 0 | OUTPATIENT
Start: 2023-05-14

## 2023-05-17 ENCOUNTER — EXTERNAL RECORD (OUTPATIENT)
Dept: HEALTH INFORMATION MANAGEMENT | Facility: OTHER | Age: 73
End: 2023-05-17

## 2023-05-25 ENCOUNTER — EXTERNAL RECORD (OUTPATIENT)
Dept: HEALTH INFORMATION MANAGEMENT | Facility: OTHER | Age: 73
End: 2023-05-25

## 2023-05-30 ENCOUNTER — EXTERNAL LAB (OUTPATIENT)
Dept: OTHER | Age: 73
End: 2023-05-30

## 2023-05-30 ENCOUNTER — LAB ENCOUNTER (OUTPATIENT)
Dept: LAB | Age: 73
End: 2023-05-30
Attending: INTERNAL MEDICINE
Payer: MEDICARE

## 2023-05-30 DIAGNOSIS — Z01.818 PREOPERATIVE EXAMINATION, UNSPECIFIED: Primary | ICD-10-CM

## 2023-05-30 LAB
ABSOLUTE IMMATURE GRANULOCYTES (OFFPRE24): 0.03 X10(3) UL (ref 0–1)
ANION GAP SERPL CALC-SCNC: 3 MMOL/L (ref 0–18)
ANION GAP SERPL CALC-SCNC: 3 MMOL/L (ref 0–18)
BASOPHILS # BLD AUTO: 0.07 X10(3) UL (ref 0–0.2)
BASOPHILS # BLD: 0.07 X10(3) UL (ref 0–0.2)
BASOPHILS NFR BLD AUTO: 1.2 %
BASOPHILS NFR BLD: 1.2 %
BUN BLD-MCNC: 16 MG/DL (ref 7–18)
BUN SERPL-MCNC: 16 MG/DL (ref 7–18)
CALCIUM BLD-MCNC: 9.4 MG/DL (ref 8.5–10.1)
CALCIUM SERPL-MCNC: 9.4 MG/DL (ref 8.5–10.1)
CALCULATED OSMO: 289 MOSM/KG (ref 275–295)
CHLORIDE SERPL-SCNC: 111 MMOL/L (ref 98–112)
CHLORIDE SERPL-SCNC: 111 MMOL/L (ref 98–112)
CO2 SERPL-SCNC: 25 MMOL/L (ref 21–32)
CO2 SERPL-SCNC: 25 MMOL/L (ref 21–32)
CREAT BLD-MCNC: 1.11 MG/DL
CREAT SERPL-MCNC: 1.11 MG/DL (ref 0.55–1.02)
EOSINOPHIL # BLD AUTO: 0.19 X10(3) UL (ref 0–0.7)
EOSINOPHIL # BLD: 0.19 X10(3) UL (ref 0–0.7)
EOSINOPHIL NFR BLD AUTO: 3.2 %
EOSINOPHIL NFR BLD: 3.2 %
ERYTHROCYTE [DISTWIDTH] IN BLOOD BY AUTOMATED COUNT: 12.9 %
ERYTHROCYTE [DISTWIDTH] IN BLOOD: 12.9 %
FASTING STATUS PATIENT QL REPORTED: YES
GFR SERPLBLD BASED ON 1.73 SQ M-ARVRAT: 53 ML/MIN/1.73M2 (ref 60–?)
GFR SERPLBLD SCHWARTZ-ARVRAT: 53 ML/MIN/1.73M2
GLUCOSE BLD-MCNC: 96 MG/DL (ref 70–99)
GLUCOSE SERPL-MCNC: 96 MG/DL (ref 70–99)
HCT VFR BLD AUTO: 39.9 %
HCT VFR BLD CALC: 39.9 % (ref 35–48)
HGB BLD-MCNC: 13.4 G/DL
HGB BLD-MCNC: 13.4 G/DL (ref 12–16)
IMM GRANULOCYTES # BLD AUTO: 0.03 X10(3) UL (ref 0–1)
IMM GRANULOCYTES NFR BLD: 0.5 %
IMMATURE GRANULOCYTES (OFFPRE25): 0.5 %
LENGTH OF FAST TIME PATIENT: YES H
LYMPHOCYTES # BLD AUTO: 1.64 X10(3) UL (ref 1–4)
LYMPHOCYTES # BLD: 1.64 X10(3) UL (ref 1–4)
LYMPHOCYTES NFR BLD AUTO: 27.3 %
LYMPHOCYTES NFR BLD: 27.3 %
MCH RBC QN AUTO: 31.2 PG (ref 26–34)
MCH RBC QN AUTO: 31.2 PG (ref 26–34)
MCHC RBC AUTO-ENTMCNC: 33.6 G/DL (ref 31–37)
MCHC RBC AUTO-ENTMCNC: 33.6 G/DL (ref 31–37)
MCV RBC AUTO: 93 FL
MCV RBC AUTO: 93 FL (ref 80–100)
MONOCYTES # BLD AUTO: 0.63 X10(3) UL (ref 0.1–1)
MONOCYTES # BLD: 0.63 X10(3) UL (ref 0.1–1)
MONOCYTES NFR BLD AUTO: 10.5 %
MONOCYTES NFR BLD: 10.5 %
NEUTROPHILS # BLD AUTO: 3.45 X10 (3) UL (ref 1.5–7.7)
NEUTROPHILS # BLD AUTO: 3.45 X10(3) UL (ref 1.5–7.7)
NEUTROPHILS # BLD: 3.45 X10(3) UL (ref 1.5–7.7)
NEUTROPHILS NFR BLD AUTO: 57.3 %
NEUTROPHILS NFR BLD: 57.3 %
OSMOLALITY SERPL CALC.SUM OF ELEC: 289 MOSM/KG (ref 275–295)
PLATELET # BLD AUTO: 208 10(3)UL (ref 150–450)
PLATELET # BLD: 208 10(3)UL (ref 150–450)
POTASSIUM SERPL-SCNC: 4.2 MMOL/L (ref 3.5–5.1)
POTASSIUM SERPL-SCNC: 4.2 MMOL/L (ref 3.5–5.1)
RBC # BLD AUTO: 4.29 X10(6)UL
RBC # BLD: 4.29 X10(6)UL (ref 3.8–5.3)
SODIUM SERPL-SCNC: 139 MMOL/L (ref 136–145)
SODIUM SERPL-SCNC: 139 MMOL/L (ref 136–145)
WBC # BLD AUTO: 6 X10(3) UL (ref 4–11)
WBC # BLD: 6 X10(3)UL (ref 4–11)

## 2023-05-30 PROCEDURE — 85025 COMPLETE CBC W/AUTO DIFF WBC: CPT

## 2023-05-30 PROCEDURE — 80048 BASIC METABOLIC PNL TOTAL CA: CPT

## 2023-06-02 RX ORDER — IRBESARTAN AND HYDROCHLOROTHIAZIDE 150; 12.5 MG/1; MG/1
TABLET, FILM COATED ORAL
Qty: 90 TABLET | Refills: 1 | Status: SHIPPED | OUTPATIENT
Start: 2023-06-02

## 2023-06-02 NOTE — TELEPHONE ENCOUNTER
Hypertension Medications Protocol Passed 06/02/2023 02:19 AM   Protocol Details  CMP or BMP in past 12 months    Last serum creatinine< 2.0    Appointment in past 6 or next 3 months     Pt seen 5/9/23    Return in about 5 months (around 10/9/2023) for physical.

## 2023-06-05 NOTE — H&P
[facility]    Cardiac Electrophysiology H&P Addendum    [facility]   [facility] [id number] MRN [id number]   Admission Date 6/23/2023 Procedure Date 6/23/2023   Attending Physician Berry Nick MD Procedure Physician Hilda Flores MD       H&P ADDENDUM    I personally reviewed the attached H&P on 6/23/2023, and no significant changes have occurred in the patient's condition since the H&P was performed. Risks, alternatives, and benefits of procedure were again reviewed at bedside with the patient. They have no additional questions and wish to proceed. Hilda Flores M.D.    Cardiac Electrophysiology       -----------------------------------------

## 2023-06-16 RX ORDER — RIVAROXABAN 20 MG/1
20 TABLET, FILM COATED ORAL EVERY EVENING
COMMUNITY

## 2023-06-23 ENCOUNTER — HOSPITAL ENCOUNTER (OUTPATIENT)
Dept: INTERVENTIONAL RADIOLOGY/VASCULAR | Facility: HOSPITAL | Age: 73
Discharge: HOME | End: 2023-06-23
Attending: INTERNAL MEDICINE | Admitting: INTERNAL MEDICINE
Payer: MEDICARE

## 2023-06-23 ENCOUNTER — EXTERNAL RECORD (OUTPATIENT)
Dept: HEALTH INFORMATION MANAGEMENT | Facility: OTHER | Age: 73
End: 2023-06-23

## 2023-06-23 VITALS
HEART RATE: 52 BPM | BODY MASS INDEX: 30.53 KG/M2 | DIASTOLIC BLOOD PRESSURE: 83 MMHG | OXYGEN SATURATION: 100 % | WEIGHT: 190 LBS | HEIGHT: 66 IN | RESPIRATION RATE: 14 BRPM | SYSTOLIC BLOOD PRESSURE: 136 MMHG

## 2023-06-23 DIAGNOSIS — I10 ESSENTIAL HYPERTENSION: ICD-10-CM

## 2023-06-23 DIAGNOSIS — I48.91 ATRIAL FIBRILLATION (HCC): ICD-10-CM

## 2023-06-23 LAB
ATRIAL RATE: 65 BPM
P AXIS: 3 DEGREES
P-R INTERVAL: 182 MS
Q-T INTERVAL: 444 MS
QRS DURATION: 80 MS
QTC CALCULATION (BEZET): 461 MS
R AXIS: 23 DEGREES
T AXIS: 27 DEGREES
VENTRICULAR RATE: 65 BPM

## 2023-06-23 PROCEDURE — 93005 ELECTROCARDIOGRAM TRACING: CPT

## 2023-06-23 PROCEDURE — 93010 ELECTROCARDIOGRAM REPORT: CPT | Performed by: INTERNAL MEDICINE

## 2023-06-23 PROCEDURE — 92960 CARDIOVERSION ELECTRIC EXT: CPT | Performed by: INTERNAL MEDICINE

## 2023-06-23 PROCEDURE — 5A2204Z RESTORATION OF CARDIAC RHYTHM, SINGLE: ICD-10-PCS | Performed by: INTERNAL MEDICINE

## 2023-06-23 RX ORDER — METHOHEXITAL IN WATER/PF 100MG/10ML
60 SYRINGE (ML) INTRAVENOUS ONCE
Status: COMPLETED | OUTPATIENT
Start: 2023-06-23 | End: 2023-06-23

## 2023-06-23 RX ORDER — METHOHEXITAL IN WATER/PF 100MG/10ML
SYRINGE (ML) INTRAVENOUS
Status: COMPLETED
Start: 2023-06-23 | End: 2023-06-23

## 2023-06-23 RX ORDER — METOPROLOL SUCCINATE 100 MG/1
100 TABLET, EXTENDED RELEASE ORAL DAILY
Qty: 90 TABLET | Refills: 1 | Status: SHIPPED | COMMUNITY
Start: 2023-06-23

## 2023-06-23 RX ORDER — SODIUM CHLORIDE 9 MG/ML
INJECTION, SOLUTION INTRAVENOUS CONTINUOUS
Status: DISCONTINUED | OUTPATIENT
Start: 2023-06-23 | End: 2023-06-23

## 2023-06-23 RX ADMIN — METHOHEXITAL IN WATER/PF 60 MG: 100MG/10ML SYRINGE (ML) INTRAVENOUS at 08:35:00

## 2023-06-23 RX ADMIN — SODIUM CHLORIDE: 9 INJECTION, SOLUTION INTRAVENOUS at 09:26:00

## 2023-06-23 NOTE — IVS NOTE
DISCHARGE NOTE     Pt is able to sit up and ambulate without difficulty. Pt voided and tolerated fluids and food. Pt denies any pain or discomfort at this time. IV access removed  Instruction provided, patient/family verbalizes understanding. Dr. Nicolas Atkins spoke with patient/family post procedure.      Pt discharge via wheelchair to [facility]     Follow up Appointment: 7/20 at 11:20am with Dr. Prisca Carlos Prescription: Lazara Miles

## 2023-06-23 NOTE — PROCEDURES
[facility]    Cardiac Electrophysiology Procedure Note    [facility]   [facility] [id number] MRN [id number]   Admission Date 6/23/2023 Procedure Date 6/23/2023   Attending Physician Billy Best MD Procedure Physician Kris Ramirez MD       Pre-Operative Diagnosis: Persistent atrial fibrillation    Post-Operative Diagnosis: Persistent atrial fibrillation    Procedure Performed:    1. Direct current cardioversion    Anesthesia: I provided moderate conscious sedation from 10:45 to 11:00. Methohexital.     Complications: None apparent    Procedural findings: The patient was brought to the procedure suite in stable and postabsorptive state after providing informed consent. A time out was performed to confirm the patient's identity and type and site of the procedure. Sedation was administered. The patient received a 200 Joule synchronized biphasic shock via anterior-posterior pads, which converted the patient from atrial fibrillation to sinus rhythm. The patient was awakened and transferred to the recovery area in stable and comfortable condition. RESULTS:  -Electrical cardioversion to sinus rhythm    PLAN:  1. Sedation recovery  2. ECG  3. Continue anticoagulation  4. Medication changes: None  5. Discharge after sedation recovery criteria met  6. Activity and driving restrictions x 24 hours  7. Follow-up with me as scheduled    Kris Ramirez M.D.    Cardiac Electrophysiology     6/23/2023  -----------------------------------------

## 2023-07-20 ENCOUNTER — EXTERNAL RECORD (OUTPATIENT)
Dept: HEALTH INFORMATION MANAGEMENT | Facility: OTHER | Age: 73
End: 2023-07-20

## 2023-07-26 ENCOUNTER — TELEPHONE (OUTPATIENT)
Dept: INTERNAL MEDICINE CLINIC | Facility: CLINIC | Age: 73
End: 2023-07-26

## 2023-07-26 DIAGNOSIS — I10 ESSENTIAL HYPERTENSION: Primary | ICD-10-CM

## 2023-07-26 RX ORDER — IRBESARTAN AND HYDROCHLOROTHIAZIDE 150; 12.5 MG/1; MG/1
1 TABLET, FILM COATED ORAL DAILY
Qty: 90 TABLET | Refills: 1 | Status: CANCELLED | OUTPATIENT
Start: 2023-07-26

## 2023-07-26 NOTE — TELEPHONE ENCOUNTER
Future Appointments   Date Time Provider Meghan Robyn   10/23/2023  1:00 PM Maxi Escamilla., [facility]     Informed must fast no call back required.  Orders to Maral Diop

## 2023-07-26 NOTE — TELEPHONE ENCOUNTER
Pt has had a few labs done already as followed. Please advise if further labs are needed?     5/30/23 CBC and BMP  4/28/23 Lipid  4/18/23 TSH

## 2023-08-07 NOTE — TELEPHONE ENCOUNTER
From: Paul Wallace  To: Dale Thomson PA-C  Sent: 4/19/2023 2:45 PM CDT  Subject: Follow up appointment    Yesterday, the ER  told me to make an appointment with a cardiologist [facility]) - the soonest I am able to get an appointment to see a DrShawn is with a Dr. Clara Multani on May 1st. I don't know if you want me to do any follow up with you between now and then, or if I need to be watching for anything before I see the cardiologist. The ER  gave me two prescriptions that I have started.  Thank you, Bakari Card
Future Appointments   Date Time Provider Meghan Carlson   4/21/2023 11:00 AM Eloisa Banda.[facility]
Ok to see cardiology as scheduled. Continue the meds as prescribed by the ER. I would like her to do an ER f/u here as well.
Was seen for A-fib with [facility]. Do you want hosp f/u prior to Cards visit?
pls call and offer follow up hospital visit with CS
Yes

## 2023-09-19 ENCOUNTER — EXTERNAL RECORD (OUTPATIENT)
Dept: HEALTH INFORMATION MANAGEMENT | Facility: OTHER | Age: 73
End: 2023-09-19

## 2023-09-22 ENCOUNTER — LAB ENCOUNTER (OUTPATIENT)
Dept: LAB | Age: 73
End: 2023-09-22
Attending: PHYSICIAN ASSISTANT
Payer: MEDICARE

## 2023-09-22 ENCOUNTER — EXTERNAL LAB (OUTPATIENT)
Dept: OTHER | Age: 73
End: 2023-09-22

## 2023-09-22 DIAGNOSIS — I48.19 PERSISTENT ATRIAL FIBRILLATION (HCC): Primary | ICD-10-CM

## 2023-09-22 DIAGNOSIS — I10 HTN (HYPERTENSION): ICD-10-CM

## 2023-09-22 DIAGNOSIS — Z79.01 LONG TERM (CURRENT) USE OF ANTICOAGULANTS: ICD-10-CM

## 2023-09-22 LAB
ABSOLUTE IMMATURE GRANULOCYTES (OFFPRE24): 0.03 X10(3) UL (ref 0–1)
ALBUMIN SERPL-MCNC: 4 G/DL (ref 3.4–5)
ALBUMIN SERPL-MCNC: 4 G/DL (ref 3.4–5)
ALBUMIN/GLOB SERPL: 1.4 {RATIO} (ref 1–2)
ALBUMIN/GLOB SERPL: 1.4 {RATIO} (ref 1–2)
ALP LIVER SERPL-CCNC: 110 U/L
ALP SERPL-CCNC: 110 U/L (ref 55–142)
ALT SERPL-CCNC: 35 U/L
ALT SERPL-CCNC: 35 U/L (ref 13–56)
ANION GAP SERPL CALC-SCNC: 8 MMOL/L (ref 0–18)
ANION GAP SERPL CALC-SCNC: 8 MMOL/L (ref 0–18)
AST SERPL-CCNC: 25 U/L (ref 15–37)
AST SERPL-CCNC: 25 U/L (ref 15–37)
BASOPHILS # BLD AUTO: 0.08 X10(3) UL (ref 0–0.2)
BASOPHILS # BLD: 0.08 X10(3) UL (ref 0–0.2)
BASOPHILS NFR BLD AUTO: 1.1 %
BASOPHILS NFR BLD: 1.1 %
BILIRUB SERPL-MCNC: 0.9 MG/DL (ref 0.1–2)
BILIRUB SERPL-MCNC: 0.9 MG/DL (ref 0.1–2)
BUN BLD-MCNC: 17 MG/DL (ref 7–18)
BUN SERPL-MCNC: 17 MG/DL (ref 7–18)
CALCIUM BLD-MCNC: 9.5 MG/DL (ref 8.5–10.1)
CALCIUM SERPL-MCNC: 9.5 MG/DL (ref 8.5–10.1)
CALCULATED OSMO: 293 MOSM/KG (ref 275–295)
CHLORIDE SERPL-SCNC: 108 MMOL/L (ref 98–112)
CHLORIDE SERPL-SCNC: 108 MMOL/L (ref 98–112)
CO2 SERPL-SCNC: 25 MMOL/L (ref 21–32)
CO2 SERPL-SCNC: 25 MMOL/L (ref 21–32)
CREAT BLD-MCNC: 0.92 MG/DL
CREAT SERPL-MCNC: 0.92 MG/DL (ref 0.55–1.02)
EGFRCR SERPLBLD CKD-EPI 2021: 67 ML/MIN/1.73M2 (ref 60–?)
EOSINOPHIL # BLD AUTO: 0.14 X10(3) UL (ref 0–0.7)
EOSINOPHIL # BLD: 0.14 X10(3) UL (ref 0–0.7)
EOSINOPHIL NFR BLD AUTO: 1.9 %
EOSINOPHIL NFR BLD: 1.9 %
ERYTHROCYTE [DISTWIDTH] IN BLOOD BY AUTOMATED COUNT: 13 %
ERYTHROCYTE [DISTWIDTH] IN BLOOD: 13 %
FASTING STATUS PATIENT QL REPORTED: NO
GFR SERPLBLD SCHWARTZ-ARVRAT: 67 ML/MIN/1.73M2
GLOBULIN PLAS-MCNC: 2.9 G/DL (ref 2.8–4.4)
GLOBULIN SER-MCNC: 2.9 G/DL (ref 2.8–4.4)
GLUCOSE BLD-MCNC: 88 MG/DL (ref 70–99)
GLUCOSE SERPL-MCNC: 88 MG/DL (ref 70–99)
HCT VFR BLD AUTO: 42.3 %
HCT VFR BLD CALC: 42.3 % (ref 35–48)
HGB BLD-MCNC: 13.8 G/DL
HGB BLD-MCNC: 13.8 G/DL (ref 12–16)
IMM GRANULOCYTES # BLD AUTO: 0.03 X10(3) UL (ref 0–1)
IMM GRANULOCYTES NFR BLD: 0.4 %
IMMATURE GRANULOCYTES (OFFPRE25): 0.4 %
LENGTH OF FAST TIME PATIENT: NO H
LYMPHOCYTES # BLD AUTO: 2.07 X10(3) UL (ref 1–4)
LYMPHOCYTES # BLD: 2.07 X10(3) UL (ref 1–4)
LYMPHOCYTES NFR BLD AUTO: 27.8 %
LYMPHOCYTES NFR BLD: 27.8 %
MCH RBC QN AUTO: 31 PG (ref 26–34)
MCH RBC QN AUTO: 31 PG (ref 26–34)
MCHC RBC AUTO-ENTMCNC: 32.6 G/DL (ref 31–37)
MCHC RBC AUTO-ENTMCNC: 32.6 G/DL (ref 31–37)
MCV RBC AUTO: 95.1 FL
MCV RBC AUTO: 95.1 FL (ref 80–100)
MONOCYTES # BLD AUTO: 0.71 X10(3) UL (ref 0.1–1)
MONOCYTES # BLD: 0.71 X10(3) UL (ref 0.1–1)
MONOCYTES NFR BLD AUTO: 9.5 %
MONOCYTES NFR BLD: 9.5 %
NEUTROPHILS # BLD AUTO: 4.42 X10 (3) UL (ref 1.5–7.7)
NEUTROPHILS # BLD AUTO: 4.42 X10(3) UL (ref 1.5–7.7)
NEUTROPHILS # BLD: 4.42 X10(3) UL (ref 1.5–7.7)
NEUTROPHILS NFR BLD AUTO: 59.3 %
NEUTROPHILS NFR BLD: 59.3 %
OSMOLALITY SERPL CALC.SUM OF ELEC: 293 MOSM/KG (ref 275–295)
PLATELET # BLD AUTO: 217 10(3)UL (ref 150–450)
PLATELET # BLD: 217 10(3)UL (ref 150–450)
POTASSIUM SERPL-SCNC: 3.5 MMOL/L (ref 3.5–5.1)
POTASSIUM SERPL-SCNC: 3.5 MMOL/L (ref 3.5–5.1)
PROT SERPL-MCNC: 6.9 G/DL (ref 6.4–8.2)
PROT SERPL-MCNC: 6.9 G/DL (ref 6.4–8.2)
RBC # BLD AUTO: 4.45 X10(6)UL
RBC # BLD: 4.45 X10(6)UL (ref 3.8–5.3)
SODIUM SERPL-SCNC: 141 MMOL/L (ref 136–145)
SODIUM SERPL-SCNC: 141 MMOL/L (ref 136–145)
TSH SERPL-ACNC: 1.28 MIU/ML (ref 0.36–3.74)
TSI SER-ACNC: 1.28 MIU/ML (ref 0.36–3.74)
WBC # BLD AUTO: 7.5 X10(3) UL (ref 4–11)
WBC # BLD: 7.5 X10(3)UL (ref 4–11)

## 2023-09-22 PROCEDURE — 85025 COMPLETE CBC W/AUTO DIFF WBC: CPT

## 2023-09-22 PROCEDURE — 84443 ASSAY THYROID STIM HORMONE: CPT

## 2023-09-22 PROCEDURE — 80053 COMPREHEN METABOLIC PANEL: CPT

## 2023-09-22 PROCEDURE — 36415 COLL VENOUS BLD VENIPUNCTURE: CPT

## 2023-10-11 ENCOUNTER — EXTERNAL RECORD (OUTPATIENT)
Dept: HEALTH INFORMATION MANAGEMENT | Facility: OTHER | Age: 73
End: 2023-10-11

## 2023-10-17 ENCOUNTER — EXTERNAL RECORD (OUTPATIENT)
Dept: HEALTH INFORMATION MANAGEMENT | Facility: OTHER | Age: 73
End: 2023-10-17

## 2023-10-23 ENCOUNTER — LAB ENCOUNTER (OUTPATIENT)
Dept: LAB | Age: 73
End: 2023-10-23
Attending: PHYSICIAN ASSISTANT

## 2023-10-23 ENCOUNTER — HOSPITAL ENCOUNTER (OUTPATIENT)
Dept: GENERAL RADIOLOGY | Facility: HOSPITAL | Age: 73
Discharge: HOME | End: 2023-10-23
Attending: PHYSICIAN ASSISTANT

## 2023-10-23 ENCOUNTER — LAB ENCOUNTER (OUTPATIENT)
Dept: LAB | Facility: HOSPITAL | Age: 73
End: 2023-10-23
Attending: PHYSICIAN ASSISTANT

## 2023-10-23 ENCOUNTER — OFFICE VISIT (OUTPATIENT)
Dept: INTERNAL MEDICINE CLINIC | Facility: CLINIC | Age: 73
End: 2023-10-23

## 2023-10-23 VITALS
HEART RATE: 88 BPM | RESPIRATION RATE: 18 BRPM | SYSTOLIC BLOOD PRESSURE: 142 MMHG | HEIGHT: 66 IN | BODY MASS INDEX: 30.76 KG/M2 | OXYGEN SATURATION: 99 % | WEIGHT: 191.38 LBS | TEMPERATURE: 97 F | DIASTOLIC BLOOD PRESSURE: 82 MMHG

## 2023-10-23 DIAGNOSIS — I48.91 ATRIAL FIBRILLATION WITH RAPID VENTRICULAR RESPONSE (HCC): ICD-10-CM

## 2023-10-23 DIAGNOSIS — Z12.31 ENCOUNTER FOR SCREENING MAMMOGRAM FOR MALIGNANT NEOPLASM OF BREAST: ICD-10-CM

## 2023-10-23 DIAGNOSIS — I10 ESSENTIAL HYPERTENSION: ICD-10-CM

## 2023-10-23 DIAGNOSIS — R06.02 SOB (SHORTNESS OF BREATH): ICD-10-CM

## 2023-10-23 DIAGNOSIS — Z12.11 SCREEN FOR COLON CANCER: ICD-10-CM

## 2023-10-23 DIAGNOSIS — Z00.00 ROUTINE GENERAL MEDICAL EXAMINATION AT A HEALTH CARE FACILITY: Primary | ICD-10-CM

## 2023-10-23 DIAGNOSIS — Z78.0 POST-MENOPAUSAL: ICD-10-CM

## 2023-10-23 LAB
ALBUMIN SERPL-MCNC: 3.7 G/DL (ref 3.4–5)
ALBUMIN/GLOB SERPL: 1.1 {RATIO} (ref 1–2)
ALP LIVER SERPL-CCNC: 110 U/L
ALT SERPL-CCNC: 51 U/L
ANION GAP SERPL CALC-SCNC: 4 MMOL/L (ref 0–18)
AST SERPL-CCNC: 30 U/L (ref 15–37)
BASOPHILS # BLD AUTO: 0.07 X10(3) UL (ref 0–0.2)
BASOPHILS NFR BLD AUTO: 1 %
BILIRUB SERPL-MCNC: 0.5 MG/DL (ref 0.1–2)
BUN BLD-MCNC: 19 MG/DL (ref 7–18)
CALCIUM BLD-MCNC: 9 MG/DL (ref 8.5–10.1)
CHLORIDE SERPL-SCNC: 111 MMOL/L (ref 98–112)
CO2 SERPL-SCNC: 27 MMOL/L (ref 21–32)
CREAT BLD-MCNC: 0.95 MG/DL
D DIMER PPP FEU-MCNC: 0.47 UG/ML FEU (ref ?–0.7)
EGFRCR SERPLBLD CKD-EPI 2021: 64 ML/MIN/1.73M2 (ref 60–?)
EOSINOPHIL # BLD AUTO: 0.14 X10(3) UL (ref 0–0.7)
EOSINOPHIL NFR BLD AUTO: 2 %
ERYTHROCYTE [DISTWIDTH] IN BLOOD BY AUTOMATED COUNT: 13.1 %
FASTING STATUS PATIENT QL REPORTED: NO
GLOBULIN PLAS-MCNC: 3.3 G/DL (ref 2.8–4.4)
GLUCOSE BLD-MCNC: 101 MG/DL (ref 70–99)
HCT VFR BLD AUTO: 39.3 %
HGB BLD-MCNC: 13.3 G/DL
IMM GRANULOCYTES # BLD AUTO: 0.03 X10(3) UL (ref 0–1)
IMM GRANULOCYTES NFR BLD: 0.4 %
LYMPHOCYTES # BLD AUTO: 1.88 X10(3) UL (ref 1–4)
LYMPHOCYTES NFR BLD AUTO: 26.8 %
MCH RBC QN AUTO: 31.4 PG (ref 26–34)
MCHC RBC AUTO-ENTMCNC: 33.8 G/DL (ref 31–37)
MCV RBC AUTO: 92.9 FL
MONOCYTES # BLD AUTO: 0.63 X10(3) UL (ref 0.1–1)
MONOCYTES NFR BLD AUTO: 9 %
NEUTROPHILS # BLD AUTO: 4.27 X10 (3) UL (ref 1.5–7.7)
NEUTROPHILS # BLD AUTO: 4.27 X10(3) UL (ref 1.5–7.7)
NEUTROPHILS NFR BLD AUTO: 60.8 %
OSMOLALITY SERPL CALC.SUM OF ELEC: 296 MOSM/KG (ref 275–295)
PLATELET # BLD AUTO: 237 10(3)UL (ref 150–450)
POTASSIUM SERPL-SCNC: 3.6 MMOL/L (ref 3.5–5.1)
PROT SERPL-MCNC: 7 G/DL (ref 6.4–8.2)
RBC # BLD AUTO: 4.23 X10(6)UL
SODIUM SERPL-SCNC: 142 MMOL/L (ref 136–145)
WBC # BLD AUTO: 7 X10(3) UL (ref 4–11)

## 2023-10-23 PROCEDURE — G0439 PPPS, SUBSEQ VISIT: HCPCS | Performed by: PHYSICIAN ASSISTANT

## 2023-10-23 PROCEDURE — 85025 COMPLETE CBC W/AUTO DIFF WBC: CPT

## 2023-10-23 PROCEDURE — 71046 X-RAY EXAM CHEST 2 VIEWS: CPT | Performed by: PHYSICIAN ASSISTANT

## 2023-10-23 PROCEDURE — 36415 COLL VENOUS BLD VENIPUNCTURE: CPT

## 2023-10-23 PROCEDURE — 85379 FIBRIN DEGRADATION QUANT: CPT

## 2023-10-23 PROCEDURE — 99214 OFFICE O/P EST MOD 30 MIN: CPT | Performed by: PHYSICIAN ASSISTANT

## 2023-10-23 PROCEDURE — 80053 COMPREHEN METABOLIC PANEL: CPT

## 2023-10-23 RX ORDER — HYDROCHLOROTHIAZIDE 12.5 MG/1
12.5 TABLET ORAL DAILY
Qty: 90 TABLET | Refills: 1 | Status: SHIPPED | OUTPATIENT
Start: 2023-10-23 | End: 2024-10-17

## 2023-10-25 PROBLEM — R07.9 ACUTE CHEST PAIN: Status: RESOLVED | Noted: 2023-04-27 | Resolved: 2023-10-25

## 2023-10-25 PROBLEM — I48.91 ATRIAL FIBRILLATION WITH CONTROLLED VENTRICULAR RESPONSE (HCC): Status: RESOLVED | Noted: 2023-04-27 | Resolved: 2023-10-25

## 2023-10-25 PROBLEM — R42 DIZZINESS: Status: RESOLVED | Noted: 2019-11-13 | Resolved: 2023-10-25

## 2023-10-27 ENCOUNTER — PATIENT MESSAGE (OUTPATIENT)
Dept: INTERNAL MEDICINE CLINIC | Facility: CLINIC | Age: 73
End: 2023-10-27

## 2023-10-27 NOTE — TELEPHONE ENCOUNTER
From: Wayne García  To: Mortimer Port  Sent: 10/27/2023 7:25 AM CDT  Subject: Your message     I am feeling the same, but the BP readings in the morning and evening have been lower than they were last week. Will send you the readings from this week on Monday. Thank you!

## 2023-11-01 ENCOUNTER — HOSPITAL ENCOUNTER (OUTPATIENT)
Age: 73
Discharge: STILL ADMITTED OR ADMINISTRATIVE | End: 2023-11-01
Payer: MEDICARE

## 2023-11-01 ENCOUNTER — LAB ENCOUNTER (OUTPATIENT)
Dept: LAB | Age: 73
End: 2023-11-01
Attending: PHYSICIAN ASSISTANT
Payer: MEDICARE

## 2023-11-01 DIAGNOSIS — Z12.31 ENCOUNTER FOR SCREENING MAMMOGRAM FOR MALIGNANT NEOPLASM OF BREAST: ICD-10-CM

## 2023-11-01 DIAGNOSIS — Z78.0 POST-MENOPAUSAL: Primary | ICD-10-CM

## 2023-11-01 DIAGNOSIS — Z12.11 SCREEN FOR COLON CANCER: ICD-10-CM

## 2023-11-01 LAB — HEMOCCULT STL QL: NEGATIVE

## 2023-11-01 PROCEDURE — 82274 ASSAY TEST FOR BLOOD FECAL: CPT

## 2023-11-02 RX ORDER — IRBESARTAN AND HYDROCHLOROTHIAZIDE 150; 12.5 MG/1; MG/1
1 TABLET, FILM COATED ORAL DAILY
Qty: 90 TABLET | Refills: 1 | Status: SHIPPED | OUTPATIENT
Start: 2023-11-02

## 2023-11-02 NOTE — TELEPHONE ENCOUNTER
PASSED per protocol, refill sent.   Last PE 10/23/23  Future Appointments   Date Time Provider Meghan Carlson   11/7/2023 11:00 AM PFT ROOM  PFT [facility]

## 2023-11-07 ENCOUNTER — RT VISIT (OUTPATIENT)
Dept: RESPIRATORY THERAPY | Facility: HOSPITAL | Age: 73
End: 2023-11-07
Attending: PHYSICIAN ASSISTANT
Payer: MEDICARE

## 2023-11-07 DIAGNOSIS — R06.02 SOB (SHORTNESS OF BREATH): ICD-10-CM

## 2023-11-07 PROCEDURE — 94729 DIFFUSING CAPACITY: CPT

## 2023-11-07 PROCEDURE — 94010 BREATHING CAPACITY TEST: CPT

## 2023-11-07 PROCEDURE — 94726 PLETHYSMOGRAPHY LUNG VOLUMES: CPT

## 2023-11-11 NOTE — PROCEDURES
Patient is a 79-year-old female being assessed with a diagnosis of shortness of breath. Results  FEV1 2.53 L normal at 109% of predicted  FVC 2.75 L normal at 91% of predicted  Ratio is normal at 92%  MVV is normal 93%  Flow-volume loop without demonstrable abnormalities    Total lung capacity 5.28 L normal at 100% of predicted  Residual volume 2.17 L normal at 101% of predicted  Diffusion capacity is mildly reduced 67% of predicted corrects to 76% of predicted when alveolar volume is considered    Impression no evidence of airways obstruction or restrictive defect. Diffusion capacity is mildly reduced with clinical correlation suggested to assess for anemia/pulmonary vascular disease or evolving ILD.       No prior studies are found

## 2023-11-15 PROBLEM — I34.0 NONRHEUMATIC MITRAL (VALVE) INSUFFICIENCY: Status: ACTIVE | Noted: 2023-11-15

## 2023-11-15 PROBLEM — I48.91 AF (ATRIAL FIBRILLATION) (CMD): Status: ACTIVE | Noted: 2023-11-15

## 2023-11-15 PROBLEM — I10 HYPERTENSION: Status: ACTIVE | Noted: 2023-11-15

## 2023-11-20 ENCOUNTER — OFFICE VISIT (OUTPATIENT)
Facility: CLINIC | Age: 73
End: 2023-11-20
Payer: MEDICARE

## 2023-11-20 VITALS
OXYGEN SATURATION: 99 % | HEART RATE: 86 BPM | RESPIRATION RATE: 18 BRPM | BODY MASS INDEX: 30.53 KG/M2 | HEIGHT: 66 IN | SYSTOLIC BLOOD PRESSURE: 128 MMHG | DIASTOLIC BLOOD PRESSURE: 76 MMHG | WEIGHT: 190 LBS

## 2023-11-20 DIAGNOSIS — R06.02 SHORTNESS OF BREATH: Primary | ICD-10-CM

## 2023-11-20 PROCEDURE — 99204 OFFICE O/P NEW MOD 45 MIN: CPT | Performed by: INTERNAL MEDICINE

## 2023-11-20 RX ORDER — NEBULIZER ACCESSORIES
1 KIT MISCELLANEOUS AS DIRECTED
Qty: 1 EACH | Refills: 0 | Status: SHIPPED | OUTPATIENT
Start: 2023-11-20

## 2023-11-21 ENCOUNTER — TELEPHONE (OUTPATIENT)
Facility: CLINIC | Age: 73
End: 2023-11-21

## 2023-11-21 NOTE — TELEPHONE ENCOUNTER
Ipratropium 0.02% pharmacy will not fill it if pt does not have asthma or pulmonary disease. Medicare will only cover if it asthma or pulmonary disease.

## 2023-11-21 NOTE — TELEPHONE ENCOUNTER
Changed diagnosis code in Rx and resent to Lorenzo Reyes. Pt notified and advised to call with any questions or concerns.

## 2023-11-22 ENCOUNTER — TELEPHONE (OUTPATIENT)
Facility: CLINIC | Age: 73
End: 2023-11-22

## 2023-11-22 DIAGNOSIS — R06.02 SHORTNESS OF BREATH: Primary | ICD-10-CM

## 2023-11-22 DIAGNOSIS — J45.30 MILD PERSISTENT ASTHMA WITHOUT COMPLICATION: ICD-10-CM

## 2023-11-22 RX ORDER — NEBULIZER ACCESSORIES
1 KIT MISCELLANEOUS AS DIRECTED
Qty: 1 EACH | Refills: 0 | Status: SHIPPED | OUTPATIENT
Start: 2023-11-22

## 2023-11-22 NOTE — TELEPHONE ENCOUNTER
Fax from [facility] received, requesting neb machine order. After speaking with pt, states she called Medicare and they are requesting a dx code for ipratropium and neb machine. Order has to be re-entered. Dx code: mild asthma was used per physician. Update: Call made to [facility] to verify if rx for neb and neb solutions had been received. Pharmacy tech stated will try to run through medicare with dx of mild asthma. Advised pt should call them and see if covered. Pt called and informed of the above. Verbalized understanding. Pt advised if inh solution and neb machine not covered. We can try sending orders to DME. Explained to pt it may take some time for her to receive neb med and machine through DME. Pt verbalized understanding. Also advised neb machine can be bought online or at [facility] if she can afford it. Pt will let us know how she would like to proceed, after hearing from [facility].

## 2023-11-27 ENCOUNTER — APPOINTMENT (OUTPATIENT)
Dept: CARDIOLOGY | Age: 73
End: 2023-11-27

## 2023-11-27 VITALS
OXYGEN SATURATION: 100 % | HEART RATE: 96 BPM | DIASTOLIC BLOOD PRESSURE: 88 MMHG | WEIGHT: 189.9 LBS | BODY MASS INDEX: 30.52 KG/M2 | SYSTOLIC BLOOD PRESSURE: 148 MMHG | HEIGHT: 66 IN

## 2023-11-27 DIAGNOSIS — I48.19 PERSISTENT ATRIAL FIBRILLATION (CMD): Primary | ICD-10-CM

## 2023-11-27 DIAGNOSIS — R06.09 DOE (DYSPNEA ON EXERTION): ICD-10-CM

## 2023-11-27 DIAGNOSIS — I10 PRIMARY HYPERTENSION: ICD-10-CM

## 2023-11-27 DIAGNOSIS — I34.0 NONRHEUMATIC MITRAL (VALVE) INSUFFICIENCY: ICD-10-CM

## 2023-11-27 PROCEDURE — 99205 OFFICE O/P NEW HI 60 MIN: CPT | Performed by: INTERNAL MEDICINE

## 2023-11-27 RX ORDER — ASPIRIN 81 MG/1
81 TABLET ORAL DAILY
COMMUNITY

## 2023-11-27 RX ORDER — NEBULIZER AND COMPRESSOR
EACH MISCELLANEOUS
COMMUNITY
Start: 2023-11-22

## 2023-11-27 RX ORDER — HYDROCHLOROTHIAZIDE 12.5 MG/1
TABLET ORAL DAILY
COMMUNITY
Start: 2023-10-23

## 2023-11-27 RX ORDER — IRBESARTAN AND HYDROCHLOROTHIAZIDE 150; 12.5 MG/1; MG/1
1 TABLET, FILM COATED ORAL DAILY
COMMUNITY
Start: 2023-11-02

## 2023-11-27 SDOH — HEALTH STABILITY: MENTAL HEALTH: DEPRESSION SCREENING SCORE: 0

## 2023-11-27 SDOH — HEALTH STABILITY: MENTAL HEALTH: LITTLE INTEREST OR PLEASURE IN ACTIVITY?: NOT AT ALL

## 2023-11-27 SDOH — HEALTH STABILITY: PHYSICAL HEALTH: ON AVERAGE, HOW MANY MINUTES DO YOU ENGAGE IN EXERCISE AT THIS LEVEL?: 0 MIN

## 2023-11-27 SDOH — HEALTH STABILITY: PHYSICAL HEALTH: ON AVERAGE, HOW MANY DAYS PER WEEK DO YOU ENGAGE IN MODERATE TO STRENUOUS EXERCISE (LIKE A BRISK WALK)?: 0 DAYS

## 2023-11-27 SDOH — HEALTH STABILITY: MENTAL HEALTH: FEELING DOWN, DEPRESSED OR HOPELESS?: NOT AT ALL

## 2023-11-27 SDOH — HEALTH STABILITY: MENTAL HEALTH: PHQ2 INTERPRETATION: NO FURTHER SCREENING NEEDED

## 2023-11-27 ASSESSMENT — PATIENT HEALTH QUESTIONNAIRE - PHQ9: SUM OF ALL RESPONSES TO PHQ9 QUESTIONS 1 AND 2: 0

## 2023-12-05 ENCOUNTER — ANCILLARY PROCEDURE (OUTPATIENT)
Dept: CARDIOLOGY | Age: 73
End: 2023-12-05
Attending: INTERNAL MEDICINE

## 2023-12-05 ENCOUNTER — CLINICAL ABSTRACT (OUTPATIENT)
Dept: HEALTH INFORMATION MANAGEMENT | Facility: OTHER | Age: 73
End: 2023-12-05

## 2023-12-05 DIAGNOSIS — I48.19 PERSISTENT ATRIAL FIBRILLATION (CMD): ICD-10-CM

## 2023-12-05 DIAGNOSIS — I10 PRIMARY HYPERTENSION: ICD-10-CM

## 2023-12-05 DIAGNOSIS — R06.09 DOE (DYSPNEA ON EXERTION): ICD-10-CM

## 2023-12-05 DIAGNOSIS — I34.0 NONRHEUMATIC MITRAL (VALVE) INSUFFICIENCY: ICD-10-CM

## 2023-12-05 LAB
AORTIC VALVE AREA (AVA): 0.8
ASCENDING AORTA (AAD): 3
AV PEAK GRADIENT (AVPG): 9
AV PEAK VELOCITY (AVPV): 1.49
AV STENOSIS SEVERITY TEXT: NORMAL
AVI LVOT PEAK GRADIENT (LVOTMG): 1.1
E WAVE DECELARATION TIME (MDT): 9.64
INTERVENTRICULAR SEPTUM IN END DIASTOLE (IVSD): 3.05
LEFT INTERNAL DIMENSION IN SYSTOLE (LVSD): 1.3
LEFT VENTRICULAR INTERNAL DIMENSION IN DIASTOLE (LVDD): 2.7
LEFT VENTRICULAR POSTERIOR WALL IN END DIASTOLE (LVPW): 3.8
LV EF: NORMAL %
LVOT VTI (LVOTVTI): 1.21
MV E TISSUE VEL MED (MESV): 8.9
MV E WAVE VEL/E TISSUE VEL MED(MSR): 8.3
MV PEAK A VELOCITY (MVPAV): 310
MV PEAK E VELOCITY (MVPEV): 1.04
RV END SYSTOLIC LONGITUDINAL STRAIN FREE WALL (RVGS): 1.9
TRICUSPID VALVE ANNULAR PEAK VELOCITY (TVAPV): 25
TRICUSPID VALVE PEAK REGURGITATION VELOCITY (TRPV): 3.1
TV ESTIMATED RIGHT ARTERIAL PRESSURE (RAP): 13.7

## 2023-12-05 PROCEDURE — 76376 3D RENDER W/INTRP POSTPROCES: CPT | Performed by: INTERNAL MEDICINE

## 2023-12-05 PROCEDURE — 93306 TTE W/DOPPLER COMPLETE: CPT | Performed by: INTERNAL MEDICINE

## 2023-12-11 ENCOUNTER — E-ADVICE (OUTPATIENT)
Dept: CARDIOLOGY | Age: 73
End: 2023-12-11

## 2023-12-13 ENCOUNTER — APPOINTMENT (OUTPATIENT)
Dept: BONE DENSITY | Age: 73
End: 2023-12-13
Attending: PHYSICIAN ASSISTANT

## 2023-12-13 ENCOUNTER — APPOINTMENT (OUTPATIENT)
Dept: MAMMOGRAPHY | Age: 73
End: 2023-12-13
Attending: PHYSICIAN ASSISTANT

## 2023-12-13 DIAGNOSIS — Z78.0 POST-MENOPAUSAL: ICD-10-CM

## 2023-12-13 DIAGNOSIS — Z12.31 ENCOUNTER FOR SCREENING MAMMOGRAM FOR MALIGNANT NEOPLASM OF BREAST: ICD-10-CM

## 2023-12-13 PROCEDURE — 77067 SCR MAMMO BI INCL CAD: CPT | Performed by: RADIOLOGY

## 2023-12-13 PROCEDURE — 77080 DXA BONE DENSITY AXIAL: CPT | Performed by: RADIOLOGY

## 2023-12-13 PROCEDURE — 77063 BREAST TOMOSYNTHESIS BI: CPT | Performed by: RADIOLOGY

## 2023-12-14 ENCOUNTER — TELEPHONE (OUTPATIENT)
Dept: INTERNAL MEDICINE CLINIC | Facility: CLINIC | Age: 73
End: 2023-12-14

## 2023-12-14 DIAGNOSIS — R92.8 ABNORMAL MAMMOGRAM: Primary | ICD-10-CM

## 2023-12-19 ENCOUNTER — APPOINTMENT (OUTPATIENT)
Dept: MAMMOGRAPHY | Age: 73
End: 2023-12-19
Attending: PHYSICIAN ASSISTANT

## 2023-12-19 ENCOUNTER — IMAGING SERVICES (OUTPATIENT)
Dept: MAMMOGRAPHY | Age: 73
End: 2023-12-19
Attending: PHYSICIAN ASSISTANT

## 2023-12-19 DIAGNOSIS — R92.8 ABNORMAL MAMMOGRAM: ICD-10-CM

## 2023-12-19 PROCEDURE — 76641 ULTRASOUND BREAST COMPLETE: CPT | Performed by: RADIOLOGY

## 2023-12-19 PROCEDURE — 77065 DX MAMMO INCL CAD UNI: CPT | Performed by: RADIOLOGY

## 2024-01-04 ENCOUNTER — TELEPHONE (OUTPATIENT)
Facility: CLINIC | Age: 74
End: 2024-01-04

## 2024-01-04 ENCOUNTER — OFFICE VISIT (OUTPATIENT)
Facility: CLINIC | Age: 74
End: 2024-01-04
Payer: MEDICARE

## 2024-01-04 VITALS
HEART RATE: 76 BPM | RESPIRATION RATE: 16 BRPM | BODY MASS INDEX: 30.53 KG/M2 | WEIGHT: 190 LBS | DIASTOLIC BLOOD PRESSURE: 72 MMHG | SYSTOLIC BLOOD PRESSURE: 114 MMHG | HEIGHT: 66 IN | OXYGEN SATURATION: 100 %

## 2024-01-04 DIAGNOSIS — J45.30 MILD PERSISTENT ASTHMA WITHOUT COMPLICATION: Primary | ICD-10-CM

## 2024-01-04 DIAGNOSIS — J45.30 MILD PERSISTENT ASTHMA WITHOUT COMPLICATION: ICD-10-CM

## 2024-01-04 DIAGNOSIS — R06.02 SHORTNESS OF BREATH: Primary | ICD-10-CM

## 2024-01-04 PROCEDURE — 99214 OFFICE O/P EST MOD 30 MIN: CPT | Performed by: INTERNAL MEDICINE

## 2024-01-04 RX ORDER — IPRATROPIUM BROMIDE AND ALBUTEROL SULFATE 2.5; .5 MG/3ML; MG/3ML
3 SOLUTION RESPIRATORY (INHALATION) 4 TIMES DAILY
Qty: 360 ML | Refills: 4 | Status: SHIPPED | OUTPATIENT
Start: 2024-01-04 | End: 2024-01-04

## 2024-01-04 NOTE — PROGRESS NOTES
[facility] Pulmonary Follow Up Note    History of Present Illness:  Tootie Matamoros is a 73 year old female who presents today for follow up of shortness of breath.  They were last seen on .    Since last visit, patient started on atrovent nebulizer with good improvement in breathing.  Is finding that she can walk further without getting short of breath as easy.  Getting good benefit from it.      Past Medical History:   Past Medical History:   Diagnosis Date    Allergic rhinitis     Arrhythmia     Essential hypertension         Past Surgical History:   Past Surgical History:   Procedure Laterality Date    EP CARDIOVERSION 1X  06/23/2023         HYSTERECTOMY               Family Medical History:   Family History   Problem Relation Age of Onset    Hypertension Mother         Social History:   Social History     Socioeconomic History    Marital status:      Spouse name: Not on file    Number of children: Not on file    Years of education: Not on file    Highest education level: Not on file   Occupational History    Not on file   Tobacco Use    Smoking status: Never    Smokeless tobacco: Never   Vaping Use    Vaping Use: Never used   Substance and Sexual Activity    Alcohol use: Yes     Alcohol/week: 2.0 standard drinks of alcohol     Types: 1 Glasses of wine, 1 Standard drinks or equivalent per week     Comment: CAGE 20    Drug use: Never    Sexual activity: Not on file   Other Topics Concern    Caffeine Concern Yes    Exercise Yes    Seat Belt Not Asked    Special Diet Not Asked    Stress Concern Not Asked    Weight Concern Not Asked     Service Not Asked    Blood Transfusions Not Asked    Occupational Exposure Not Asked    Hobby Hazards Not Asked    Sleep Concern Not Asked    Back Care Not Asked    Bike Helmet Not Asked    Self-Exams Not Asked   Social History Narrative    Not on file     Social Determinants of Health     Financial Resource Strain: Not on file   Food Insecurity: Not on file    Transportation Needs: Not on file   Physical Activity: Not on file   Stress: Not on file   Social Connections: Not on file   Housing Stability: Not on file        Medications:   Current Outpatient Medications   Medication Sig Dispense Refill    ipratropium-albuterol 0.5-2.5 (3) MG/3ML Inhalation Solution Take 3 mL by nebulization 4 (four) times daily. 360 mL 4    Respiratory Therapy Supplies (NEBULIZER/TUBING/MOUTHPIECE) Does not apply Kit 1 kit As Directed. 1 each 0    ipratropium 0.02 % Inhalation Solution Take 2.5 mL (500 mcg total) by nebulization 4 (four) times daily. 300 mL 5    Respiratory Therapy Supplies (NEBULIZER/TUBING/MOUTHPIECE) Does not apply Kit 1 kit As Directed. 1 each 0    Irbesartan-hydroCHLOROthiazide 150-12.5 MG Oral Tab Take 1 tablet by mouth daily. 90 tablet 1    hydroCHLOROthiazide 12.5 MG Oral Tab Take 1 tablet (12.5 mg total) by mouth daily. 90 tablet 1    XARELTO 20 MG Oral Tab Take 1 tablet (20 mg total) by mouth every evening.      Fexofenadine HCl (ALLEGRA OR) Take 1 tablet by mouth daily as needed.      Pseudoephedrine HCl (PSEUDO OR) Take 1 tablet by mouth daily as needed.      Cholecalciferol (VITAMIN D3) 75 MCG (3000 UT) Oral Tab Take 1 tablet by mouth daily.         Review of Systems: Review of Systems     Physical Exam:  /72 (BP Location: Right arm, Patient Position: Sitting, Cuff Size: adult)   Pulse 76   Resp 16   Ht 5' 6\" (1.676 m)   Wt 190 lb (86.2 kg)   SpO2 100%   BMI 30.67 kg/m²      Constitutional: alert, cooperative. No acute distress.  HEENT: Head NC/AT.   Cardio: Regular rate and rhythm. Normal S1 and S2. No murmurs.   Respiratory: Thorax symmetrical with no labored breathing. Clear to ausculation bilaterally with symmetrical breath sounds. No wheezing, rhonchi, rales, or crackles.   GI: NABS. Abd soft, non-tender.  Extremities: No clubbing or cyanosis. No BLE edema.    Neurologic: A&Ox3. No gross motor deficits.  Skin: Warm, dry  Psych: Calm,  cooperative. Pleasant affect.    Results:  Personally reviewed  XR CHEST PA + LAT CHEST (QDV=25590)  Narrative: PROCEDURE:  XR CHEST PA + LAT CHEST (CPT=71046)     INDICATIONS:  R06.02 SOB (shortness of breath)     COMPARISON:  [facility] , XR, XR CHEST AP PORTABLE  (CPT=71045), 4/27/2023, 2:02 PM.  [facility] , XR, XR CHEST AP PORTABLE  (CPT=71045), 4/18/2023, 2:53 PM.     TECHNIQUE:  PA and lateral chest radiographs were obtained.     PATIENT STATED HISTORY: (As transcribed by Technologist)  SOB x6 month. History of a fib.          FINDINGS:    No focal consolidation.  No pleural effusion.  No pneumothorax.  No pulmonary edema.  The cardiomediastinal silhouette is within normal limits.  No acute osseous findings.                   Impression: CONCLUSION:  No acute radiographic findings.        LOCATION:  [facility]        Dictated by (CST): Nathen Roldan MD on 10/23/2023 at 3:17 PM       Finalized by (CST): Nathen Roldan MD on 10/23/2023 at 3:18 PM         PFTs:       No data to display                   No data to display                    WBC: 7, done on 10/23/2023.  HGB: 13.3, done on 10/23/2023.  PLT: 237, done on 10/23/2023.     Glucose: 101, done on 10/23/2023.  Cr: 0.95, done on 10/23/2023.  GFR(AA): 74, done on 9/2/2021.  GFR (non-AA): 64, done on 9/2/2021.  CA: 9, done on 10/23/2023.  Na: 142, done on 10/23/2023.  K: 3.6, done on 10/23/2023.  Cl: 111, done on 10/23/2023.  CO2: 27, done on 10/23/2023.  Last ALB was 3.7% done on 10/23/2023.     XR CHEST PA + LAT CHEST (CPT=71046)    Result Date: 10/23/2023  CONCLUSION:  No acute radiographic findings.   LOCATION:  [facility]   Dictated by (CST): Nathen Roldan MD on 10/23/2023 at 3:17 PM     Finalized by (CST): Nathen Roldan MD on 10/23/2023 at 3:18 PM         Assessment/Plan:  #1. Likely mild asthma vs COPD  Change from atrovent to duonebs as long as cost is ok  If expensive, told to call back to send to [facility]  Continue to exercise as tolerated      Return in  about 3 months (around 4/4/2024).      Sally Shanks MD  1/4/2024

## 2024-01-05 RX ORDER — IPRATROPIUM BROMIDE AND ALBUTEROL SULFATE 2.5; .5 MG/3ML; MG/3ML
3 SOLUTION RESPIRATORY (INHALATION) 4 TIMES DAILY
Qty: 360 ML | Refills: 3 | Status: SHIPPED | OUTPATIENT
Start: 2024-01-05

## 2024-01-08 ENCOUNTER — TELEPHONE (OUTPATIENT)
Facility: CLINIC | Age: 74
End: 2024-01-08

## 2024-01-08 NOTE — TELEPHONE ENCOUNTER
Received fax from cover my meds requesting additional information for patients Ipratropium-albuterol solution.  Used provided key (Key: [id number])  Updated information and now waiting approval/denial.

## 2024-01-08 NOTE — TELEPHONE ENCOUNTER
[facility] pharmacy called to check status on Duoneb order. Per pharmacy tech, just waiting on pt to bring in Medicare part b card. Pt called and made aware. Will pick rx up today. Pt also made aware Dr. Shanks. Per his note wants pt to stop atrovent and start Duoneb. Pt verbalized understanding, states she will make sure she gets duoneb and not atrovent at the pharmacy. Closing encounter.

## 2024-01-17 ENCOUNTER — MED REC SCAN ONLY (OUTPATIENT)
Dept: INTERNAL MEDICINE CLINIC | Facility: CLINIC | Age: 74
End: 2024-01-17

## 2024-01-18 ENCOUNTER — APPOINTMENT (OUTPATIENT)
Dept: CARDIOLOGY | Age: 74
End: 2024-01-18
Attending: INTERNAL MEDICINE

## 2024-01-18 ENCOUNTER — EXTERNAL RECORD (OUTPATIENT)
Dept: HEALTH INFORMATION MANAGEMENT | Facility: OTHER | Age: 74
End: 2024-01-18

## 2024-01-18 DIAGNOSIS — I10 PRIMARY HYPERTENSION: ICD-10-CM

## 2024-01-18 DIAGNOSIS — R06.09 DOE (DYSPNEA ON EXERTION): ICD-10-CM

## 2024-01-18 DIAGNOSIS — I48.19 PERSISTENT ATRIAL FIBRILLATION (CMD): ICD-10-CM

## 2024-01-18 DIAGNOSIS — I34.0 NONRHEUMATIC MITRAL (VALVE) INSUFFICIENCY: ICD-10-CM

## 2024-01-18 LAB
HEART RATE RESERVE PREDICTED: 14.77 BPM
RESTING HR ACHIEVED: 68 BPM
STRESS BASELINE BP: NORMAL MMHG
STRESS O2 SAT REST: 99 %
STRESS PEAK HR: 127 BPM
STRESS PERCENT HR: 85 %
STRESS POST ESTIMATED WORKLOAD: 4.6 METS
STRESS POST EXERCISE DUR MIN: 7 MIN
STRESS POST EXERCISE DUR SEC: 30 SEC
STRESS POST PEAK BP: NORMAL MMHG
STRESS TARGET HR: 149 BPM

## 2024-01-18 PROCEDURE — 94621 CARDIOPULM EXERCISE TESTING: CPT | Performed by: INTERNAL MEDICINE

## 2024-01-18 ASSESSMENT — EXERCISE STRESS TEST
STAGE_CATEGORIES: 3
PEAK_O2_SAT: 97
PEAK_BP: 130/70
PEAK_BP: 152/80
PEAK_BP: 166/80
STAGE_CATEGORIES: 1
PEAK_BP: 160/80
PEAK_HR: 100
PEAK_O2_SAT: 98
PEAK_HR: 89
PEAK_RPP: 15200
MPH: 2.9
PEAK_RPP: 10920
PEAK_HR: 127
COMMENTS: RPE:13
STAGE_CATEGORIES: RECOVERY 1
COMMENTS: RPE:17
STAGE_CATEGORIES: RECOVERY 0
PEAK_HR: 78
STAGE_CATEGORIES: 4
STOPPAGE_REASON: DYSPNEA
PEAK_HR: 112
GRADE: 4
PEAK_HR: 68
STAGE_CATEGORIES: 2
MPH: 2.6
PEAK_O2_SAT: 98
GRADE: 8
PEAK_RPP: 17920
PEAK_O2_SAT: 98
PEAK_O2_SAT: 98
MPH: 1.8
STAGE_CATEGORIES: RESTING
PEAK_HR: 127
PEAK_METS: 4.6
PEAK_BP: 140/78
PEAK_RPP: 9300
PEAK_BP: 140/78
STRESS_SYMPTOMS: DYSPNEA
MPH: 2.2
PEAK_RPP: 8840
STAGE_CATEGORIES: RECOVERY 2
PEAK_HR: 75
PEAK_RPP: 21082
PEAK_RPP: 12460
GRADE: 10
PEAK_BP: 124/70
GRADE: 7

## 2024-01-30 ENCOUNTER — E-ADVICE (OUTPATIENT)
Dept: CARDIOLOGY | Age: 74
End: 2024-01-30

## 2024-01-31 ENCOUNTER — TELEPHONE (OUTPATIENT)
Dept: CARDIOLOGY | Age: 74
End: 2024-01-31

## 2024-01-31 LAB
BODY MASS INDEX: 30.8
BREATHING RESERVE (CALCULATED) PREDICTED: 23.2 %
BREATHING RESERVE (MEASURED) ACHIEVED: 42.6 %
CHANGE VO2/ CHANGE WR ACHIEVED: 7.8 ML/MIN/WATT
CHRONOTROPIC INDEX PREDICTED: 0.76
HEART RATE RESERVE PREDICTED: 14.77 BPM
HEIGHT: 167 CM
IDEAL BODY WEIGHT: 66 KG
METS ACHIEVED: 4.6
O2 SATURATION ACHIEVED: 98 %
OUES ACHIEVED: 1277.9
PEAK HR ACHIEVED: 127 BPM
PEAK HR PREDICTED: 149 BPM
PEAK O2 PULSE (%) PREDICTED: 113.22 %
PEAK O2 PULSE (ML/BEAT) ACHIEVED: 10.87 ML/BEAT
PEAK O2 PULSE (ML/BEAT) PREDICTED: 9.6 ML/BEAT
PEAK RESPIRATORY RATE ACHIEVED: 37 BRS/MIN
PEAK VE ACHIEVED: 57.6 L/MIN
PECO2 ACHIEVED: 25.1 MMHG
PECO2/PETCO2 AT PREDICTED: 73.82 %
PETCO2 ACHIEVED: 34 MMHG
PREDICTED VO2 % AT AT: 68.67 %
PREDICTED VO2 %: 96.39 %
RER MAX ACHIEVED: 1.08
RESTING HR ACHIEVED: 68 BPM
RESTING MVV: 75 L/MIN
STRESS BASELINE BP: NORMAL MMHG
STRESS O2 SAT REST: 99 %
STRESS PEAK HR: 127 BPM
STRESS PERCENT HR: 85 %
STRESS POST ESTIMATED WORKLOAD: 4.6 METS
STRESS POST EXERCISE DUR MIN: 7 MIN
STRESS POST EXERCISE DUR SEC: 30 SEC
STRESS POST PEAK BP: NORMAL MMHG
STRESS TARGET HR: 149 BPM
VD/VT ACHIEVED: 0.27
VE/VCO2 AT ACHIEVED: 34
VE/VO2 AT ACHIEVED: 28
VO2 AT ACHIEVED: 11.4 ML/KG/MIN
VO2 AT AT (ML/MIN) ACHIEVED: 980 ML/MIN
VO2 AT, IBW ACHIEVED: 14.85 ML/KG/MIN
VO2 PEAK (ML/KG/MIN) ACHIEVED: 16 ML/KG/MIN
VO2 PEAK (ML/KG/MIN) PREDICTED: 16.6 ML/KG/MIN
VO2 PEAK (ML/MIN) ACHIEVED: 1381 ML/MIN
VO2 PEAK (ML/MIN) PREDICTED: 1431 ML/MIN
VO2 PEAK IBW ACHIEVED: 20.92 ML/KG/MIN
VT/IC PREDICTED: 73 %
WEIGHT MEASUREMENT: 86 KG

## 2024-02-01 ENCOUNTER — TELEPHONE (OUTPATIENT)
Dept: CARDIOLOGY | Age: 74
End: 2024-02-01

## 2024-02-01 ENCOUNTER — E-ADVICE (OUTPATIENT)
Dept: CARDIOLOGY | Age: 74
End: 2024-02-01

## 2024-03-07 ENCOUNTER — MED REC SCAN ONLY (OUTPATIENT)
Facility: CLINIC | Age: 74
End: 2024-03-07

## 2024-03-21 RX ORDER — HYDROCHLOROTHIAZIDE 12.5 MG/1
12.5 TABLET ORAL DAILY
Qty: 90 TABLET | Refills: 1 | Status: SHIPPED | OUTPATIENT
Start: 2024-03-21

## 2024-03-21 NOTE — TELEPHONE ENCOUNTER
Last VISIT:10/23/2023 [facility]    Last CPE:10/23/2023     Last REFILL:10/23/2023   Medication Quantity Refills Start End   hydroCHLOROthiazide 12.5 MG Oral Tab 90 tablet 1         Last LABS: 10/23/2023    Future Appointments   Date Time Provider Department Center   4/4/2024 11:00 AM Sally Shanks MD [facility]         Per PROTOCOL? Passed Protocol

## 2024-04-04 ENCOUNTER — OFFICE VISIT (OUTPATIENT)
Facility: CLINIC | Age: 74
End: 2024-04-04
Payer: MEDICARE

## 2024-04-04 VITALS
WEIGHT: 182 LBS | HEART RATE: 70 BPM | OXYGEN SATURATION: 99 % | BODY MASS INDEX: 29.25 KG/M2 | HEIGHT: 66 IN | RESPIRATION RATE: 16 BRPM | SYSTOLIC BLOOD PRESSURE: 118 MMHG | DIASTOLIC BLOOD PRESSURE: 72 MMHG

## 2024-04-04 DIAGNOSIS — R06.02 SHORTNESS OF BREATH: Primary | ICD-10-CM

## 2024-04-04 PROCEDURE — 99214 OFFICE O/P EST MOD 30 MIN: CPT | Performed by: INTERNAL MEDICINE

## 2024-04-04 RX ORDER — BUDESONIDE, GLYCOPYRROLATE, AND FORMOTEROL FUMARATE 160; 9; 4.8 UG/1; UG/1; UG/1
2 AEROSOL, METERED RESPIRATORY (INHALATION) 2 TIMES DAILY
Qty: 10.7 G | Refills: 5 | Status: SHIPPED | OUTPATIENT
Start: 2024-04-04

## 2024-04-04 NOTE — PROGRESS NOTES
[facility] Pulmonary Follow Up Note    Chief Complaint:  Chief Complaint   Patient presents with    Dyspnea     Here for a 3 month f/u today. States sob is the same        History of Present Illness:  Tootie Matamoros is a 73 year old female who presents today for follow up of shortness of breath.      Interval history:  Since last visit, patients breathing is overall better, but still with some shortness of breath on exertion.  Had CPET which had to be stopped due to lack of exercise capacity.  We reviewed her CPAP together.  Patient still getting significantly dyspneic on exertion, which is not getting worse, but is staying about the same.      Past Medical History:   Past Medical History:   Diagnosis Date    Allergic rhinitis     Arrhythmia     Essential hypertension         Past Surgical History:   Past Surgical History:   Procedure Laterality Date    EP CARDIOVERSION 1X  06/23/2023         HYSTERECTOMY               Family Medical History:   Family History   Problem Relation Age of Onset    Hypertension Mother         Social History:   Social History     Socioeconomic History    Marital status:      Spouse name: Not on file    Number of children: Not on file    Years of education: Not on file    Highest education level: Not on file   Occupational History    Not on file   Tobacco Use    Smoking status: Never    Smokeless tobacco: Never   Vaping Use    Vaping Use: Never used   Substance and Sexual Activity    Alcohol use: Yes     Alcohol/week: 2.0 standard drinks of alcohol     Types: 1 Glasses of wine, 1 Standard drinks or equivalent per week     Comment: CAGE 20    Drug use: Never    Sexual activity: Not on file   Other Topics Concern    Caffeine Concern Yes    Exercise Yes    Seat Belt Not Asked    Special Diet Not Asked    Stress Concern Not Asked    Weight Concern Not Asked     Service Not Asked    Blood Transfusions Not Asked    Occupational Exposure Not Asked    Hobby Hazards Not Asked     Sleep Concern Not Asked    Back Care Not Asked    Bike Helmet Not Asked    Self-Exams Not Asked   Social History Narrative    Not on file     Social Determinants of Health     Financial Resource Strain: Not on file   Food Insecurity: Not on file   Transportation Needs: Not on file   Physical Activity: Not on file   Stress: Not on file   Social Connections: Not on file   Housing Stability: Not on file        Medications:   Current Outpatient Medications   Medication Sig Dispense Refill    budeson-glycopyrrol-formoterol (BREZTRI AEROSPHERE) 160-9-4.8 MCG/ACT Inhalation Aerosol Inhale 2 puffs into the lungs 2 (two) times daily. 10.7 g 5    HYDROCHLOROTHIAZIDE 12.5 MG Oral Tab TAKE 1 TABLET(12.5 MG) BY MOUTH DAILY 90 tablet 1    ipratropium-albuterol 0.5-2.5 (3) MG/3ML Inhalation Solution Take 3 mL by nebulization 4 (four) times daily. 360 mL 3    Respiratory Therapy Supplies (NEBULIZER/TUBING/MOUTHPIECE) Does not apply Kit 1 kit As Directed. 1 each 0    Respiratory Therapy Supplies (NEBULIZER/TUBING/MOUTHPIECE) Does not apply Kit 1 kit As Directed. 1 each 0    Irbesartan-hydroCHLOROthiazide 150-12.5 MG Oral Tab Take 1 tablet by mouth daily. 90 tablet 1    Fexofenadine HCl (ALLEGRA OR) Take 1 tablet by mouth daily as needed.      Pseudoephedrine HCl (PSEUDO OR) Take 1 tablet by mouth daily as needed.      Cholecalciferol (VITAMIN D3) 75 MCG (3000 UT) Oral Tab Take 1 tablet by mouth daily.      ipratropium 0.02 % Inhalation Solution Take 2.5 mL (500 mcg total) by nebulization 4 (four) times daily. (Patient not taking: Reported on 4/4/2024) 300 mL 5    XARELTO 20 MG Oral Tab Take 1 tablet (20 mg total) by mouth every evening. (Patient not taking: Reported on 4/4/2024)         Review of Systems: Review of Systems     Physical Exam:  /72 (BP Location: Left arm, Patient Position: Sitting, Cuff Size: adult)   Pulse 70   Resp 16   Ht 5' 6\" (1.676 m)   Wt 182 lb (82.6 kg)   SpO2 99%   BMI 29.38 kg/m²       Constitutional: alert, cooperative. No acute distress.  HEENT: Head NC/AT. Nares normal. Septum midline. Mucosa normal. No drainage or sinus tenderness.  Cardio: Regular rate and rhythm. Normal S1 and S2. No murmurs.   Respiratory: Thorax symmetrical with no labored breathing. clear to auscultation bilaterally  Extremities: No clubbing or cyanosis. No BLE edema.    Neurologic: A&Ox3. No gross motor deficits.  Skin: Warm, dry  Psych: Calm, cooperative. Pleasant affect.    Results:  Personally reviewed  XR CHEST PA + LAT CHEST (UWS=85709)  Narrative: PROCEDURE:  XR CHEST PA + LAT CHEST (CPT=71046)     INDICATIONS:  R06.02 SOB (shortness of breath)     COMPARISON:  [facility] , XR, XR CHEST AP PORTABLE  (CPT=71045), 4/27/2023, 2:02 PM.  [facility] , XR, XR CHEST AP PORTABLE  (CPT=71045), 4/18/2023, 2:53 PM.     TECHNIQUE:  PA and lateral chest radiographs were obtained.     PATIENT STATED HISTORY: (As transcribed by Technologist)  SOB x6 month. History of a fib.          FINDINGS:    No focal consolidation.  No pleural effusion.  No pneumothorax.  No pulmonary edema.  The cardiomediastinal silhouette is within normal limits.  No acute osseous findings.                   Impression: CONCLUSION:  No acute radiographic findings.        LOCATION:  [facility]        Dictated by (CST): Nathen Roldan MD on 10/23/2023 at 3:17 PM       Finalized by (CST): Nathen Roldan MD on 10/23/2023 at 3:18 PM         PFTs:       No data to display                   No data to display                    WBC: 7, done on 10/23/2023.  HGB: 13.3, done on 10/23/2023.  PLT: 237, done on 10/23/2023.     Glucose: 101, done on 10/23/2023.  Cr: 0.95, done on 10/23/2023.  GFR(AA): 74, done on 9/2/2021.  GFR (non-AA): 64, done on 9/2/2021.  CA: 9, done on 10/23/2023.  Na: 142, done on 10/23/2023.  K: 3.6, done on 10/23/2023.  Cl: 111, done on 10/23/2023.  CO2: 27, done on 10/23/2023.  Last ALB was 3.7% done on 10/23/2023.     No results found.      Assessment/Plan:  #1. Shortness of breath  Likely multifactorial  PFTs reviewed possibly some dec DLCO (corrects to normal with zoe volume) but otherwise normal  Trial of breztri (for cost issues)  Encouraged to exercise as much as possible    Return in about 2 months (around 6/4/2024).    I spent 35 minutes obtaining and reviewing records, preparing for the visit including reviewing chart and prior testing, face to face time examining the patient and obtaining history, counseling, arranging and reviewing office-based testing, independently reviewing relevant studies and documentation exclusive of other billable procedures.      Sally Shanks MD  4/4/2024

## 2024-05-22 ENCOUNTER — PATIENT MESSAGE (OUTPATIENT)
Dept: INTERNAL MEDICINE CLINIC | Facility: CLINIC | Age: 74
End: 2024-05-22

## 2024-05-22 RX ORDER — IRBESARTAN AND HYDROCHLOROTHIAZIDE 150; 12.5 MG/1; MG/1
1 TABLET, FILM COATED ORAL DAILY
Qty: 90 TABLET | Refills: 1 | OUTPATIENT
Start: 2024-05-22

## 2024-05-22 NOTE — TELEPHONE ENCOUNTER
REFILL PASSED PER Located within [facility] PROT[facility]OLS     Please review pended refill request as unable to refill due to high/very high drug interaction warning copied here;     Duplicate Therapy: Irbesartan-hydroCHLOROthiazide, hydroCHLOROthiazideTHIAZIDE DIURETICS. No Abuse/Dependency Potential.  Details   Requested Prescriptions   Pending Prescriptions Disp Refills    Irbesartan-hydroCHLOROthiazide 150-12.5 MG Oral Tab 90 tablet 1     Sig: Take 1 tablet by mouth daily.       Hypertension Medications Protocol Passed - 5/21/2024  9:50 AM        Passed - CMP or BMP in past 12 months        Passed - Last BP reading less than 140/90     BP Readings from Last 1 Encounters:   04/04/24 118/72               Passed - In person appointment or virtual visit in the past 12 mos or appointment in next 3 mos     [facility]              [facility]    [facility]    4 months ago Shortness of breath    [facility] Sally Shanks MD    Office Visit    6 months ago Shortness of breath    [facility] Sally Shanks MD    Office Visit    7 months ago Routine general medical examination at a health care facility    [facility], [address] [place] [address] Patricia Self PA-C    Office Visit    1 year ago Hospital discharge follow-up    [address]    Office Visit          Future Appointments         Provider Department Appt Notes    In 1 week Bernabe Cordero MD [facility]     In 2 weeks Sally Shanks MD [facility] f/u 2 mo                    Passed - EGFRCR or GFRNAA > 50     GFR Evaluation  EGFRCR: 64 , resulted on 10/23/2023                Future Appointments         Provider Department Appt Notes    In 1 week Bernabe Cordero MD [facility]     In 2 weeks Sally Shanks MD [facility] f/u 2 mo          Recent Outpatient Visits              1 month ago Shortness of breath    [facility] [place]    Office Visit    4 months ago [facility] breath    [facility]    Office Visit    6 months ago Shortness of breath    [facility] [place]    Office Visit    7 months ago Routine general medical examination at a health care facility    [facility], [address] [place] [address], Patricia Pope PA-C    Office Visit    1 year ago Hospital discharge follow-up    [facility], [address] [place] [address], Patricia Pope PA-C    Office Visit

## 2024-05-23 RX ORDER — IRBESARTAN AND HYDROCHLOROTHIAZIDE 150; 12.5 MG/1; MG/1
1 TABLET, FILM COATED ORAL DAILY
Qty: 90 TABLET | Refills: 0 | Status: SHIPPED | OUTPATIENT
Start: 2024-05-23

## 2024-05-23 NOTE — TELEPHONE ENCOUNTER
From: Tootie Matamoros  To: Patricia Self  Sent: 5/22/2024 8:12 PM CDT  Subject: Prescription renewal    I have a message from [facility] that my request for you to issue a refill for the irbesartan 150/12.5 was denied. Not sure why- Please contact me and let me know if your office sent the prescription to [facility] or if I need to follow up with them or with you. I am due for a 90 day refill now. The last 90 day was issued 2/20/24. Thanks in advance for your help.

## 2024-05-23 NOTE — TELEPHONE ENCOUNTER
Last refill  Irbesartan-hydroCHLOROthiazide 150-12.5 MG Oral Tab 90 tablet 1 11/2/2023 --   Sig:   Take 1 tablet by mouth daily.       See routed refill 5/21/24.

## 2024-05-27 RX ORDER — IRBESARTAN AND HYDROCHLOROTHIAZIDE 150; 12.5 MG/1; MG/1
1 TABLET, FILM COATED ORAL DAILY
Qty: 90 TABLET | Refills: 1 | OUTPATIENT
Start: 2024-05-27

## 2024-05-30 ENCOUNTER — OFFICE VISIT (OUTPATIENT)
Dept: INTERNAL MEDICINE CLINIC | Facility: CLINIC | Age: 74
End: 2024-05-30

## 2024-05-30 ENCOUNTER — HOSPITAL ENCOUNTER (OUTPATIENT)
Dept: GENERAL RADIOLOGY | Age: 74
Discharge: HOME | End: 2024-05-30
Attending: PHYSICIAN ASSISTANT
Payer: MEDICARE

## 2024-05-30 VITALS
OXYGEN SATURATION: 99 % | DIASTOLIC BLOOD PRESSURE: 84 MMHG | SYSTOLIC BLOOD PRESSURE: 138 MMHG | HEIGHT: 66.54 IN | BODY MASS INDEX: 28.59 KG/M2 | TEMPERATURE: 99 F | WEIGHT: 180 LBS | HEART RATE: 80 BPM | RESPIRATION RATE: 18 BRPM

## 2024-05-30 DIAGNOSIS — M25.511 ACUTE PAIN OF RIGHT SHOULDER: ICD-10-CM

## 2024-05-30 DIAGNOSIS — M79.601 RIGHT ARM PAIN: Primary | ICD-10-CM

## 2024-05-30 DIAGNOSIS — M79.601 RIGHT ARM PAIN: ICD-10-CM

## 2024-05-30 PROCEDURE — 99213 OFFICE O/P EST LOW 20 MIN: CPT | Performed by: PHYSICIAN ASSISTANT

## 2024-05-30 PROCEDURE — G2211 COMPLEX E/M VISIT ADD ON: HCPCS | Performed by: PHYSICIAN ASSISTANT

## 2024-05-30 PROCEDURE — 73030 X-RAY EXAM OF SHOULDER: CPT | Performed by: PHYSICIAN ASSISTANT

## 2024-05-30 NOTE — PROGRESS NOTES
Chief Complaint   Patient presents with    Arm Pain     ES rm - 6 - INTMT R upper arm pain for 5 wks at a 7/10.       HPI:  Pt presents with c/o R upper arm pain for 5 weeks.  Pain started after beginning a walking program via a walking video series that included arm movements.  Pt has restricted ROM specifically with difficulty in raising her arm above her head.  + pain at night.  She has noticed some stiffness in her R hand as well and is having difficulty with certain activities like turning a door handle due to pain.  Pt has not tried any OTC meds as she has been concerned with interactions with her other meds.      Review of Systems   No f/c/chest pain or sob. No cough. No n/v/d. No ha or dizziness. No other complaints today.    Past Medical History:    Allergic rhinitis    Arrhythmia    Essential hypertension       Patient Active Problem List   Diagnosis    Essential hypertension    Atrial fibrillation with rapid ventricular response (HCC)    SOB (shortness of breath)       Current Outpatient Medications   Medication Sig Dispense Refill    aspirin 81 MG Oral Tab EC Take 1 tablet (81 mg total) by mouth daily.      Mometasone Furoate 0.1 % External Solution Apply to scalp twice daily 60 mL 1    Irbesartan-hydroCHLOROthiazide 150-12.5 MG Oral Tab Take 1 tablet by mouth daily. 90 tablet 0    budeson-glycopyrrol-formoterol (BREZTRI AEROSPHERE) 160-9-4.8 MCG/ACT Inhalation Aerosol Inhale 2 puffs into the lungs 2 (two) times daily. 10.7 g 5    HYDROCHLOROTHIAZIDE 12.5 MG Oral Tab TAKE 1 TABLET(12.5 MG) BY MOUTH DAILY 90 tablet 1    ipratropium-albuterol 0.5-2.5 (3) MG/3ML Inhalation Solution Take 3 mL by nebulization 4 (four) times daily. 360 mL 3    Respiratory Therapy Supplies (NEBULIZER/TUBING/MOUTHPIECE) Does not apply Kit 1 kit As Directed. 1 each 0    ipratropium 0.02 % Inhalation Solution Take 2.5 mL (500 mcg total) by nebulization 4 (four) times daily. 300 mL 5    Respiratory Therapy Supplies  (NEBULIZER/TUBING/MOUTHPIECE) Does not apply Kit 1 kit As Directed. 1 each 0    Fexofenadine HCl (ALLEGRA OR) Take 1 tablet by mouth daily as needed.      Pseudoephedrine HCl (PSEUDO OR) Take 1 tablet by mouth daily as needed.      Cholecalciferol (VITAMIN D3) 75 MCG (3000 UT) Oral Tab Take 1 tablet by mouth daily.         Physical Exam  /84 (BP Location: Left arm, Patient Position: Sitting, Cuff Size: large)   Pulse 80   Temp 98.8 °F (37.1 °C) (Temporal)   Resp 18   Ht 5' 6.54\" (1.69 m)   Wt 180 lb (81.6 kg)   SpO2 99%   BMI 28.59 kg/m²   Constitutional:  No distress.   HEENT:  Normocephalic and atraumatic.  MS:  R shoulder with decreased AROM in abduction to 75 deg, FF to 100 deg and IR to lumbar spine.  + empty can test.  + tenderness with palp to R lateral deltoid area.    Skin: Skin is warm and dry. No rash noted. No erythema. No pallor.       A/P:    Encounter Diagnoses   Name Primary?    Right arm pain - suspect referred pain from RTC tear.  OK to try Tylenol OTC prn pain and Salonpas patches prn pain (OTC).   Yes    Acute pain of right shoulder - suspect RTC tear.  Check X-rays and consider MRI once results of X-ray available.  Will also likely need Ortho eval.        No orders of the defined types were placed in this encounter.      Meds & Refills for this Visit:  Requested Prescriptions      No prescriptions requested or ordered in this encounter       Imaging & Consults:  None    No follow-ups on file.  There are no Patient Instructions on file for this visit.    All questions were answered and the patient understands the plan.

## 2024-06-03 ENCOUNTER — TELEPHONE (OUTPATIENT)
Dept: ORTHOPEDICS CLINIC | Facility: CLINIC | Age: 74
End: 2024-06-03

## 2024-06-03 NOTE — TELEPHONE ENCOUNTER
Patient is scheduled for right rotator cuff. X-rays in Epic from 5/30. Please advise if additional imaging is needed.  Future Appointments   Date Time Provider Department Center   6/7/2024 10:10 AM [id number]   6/11/2024 11:15 AM Sally Shanks MD  [facility] [place]

## 2024-06-07 ENCOUNTER — OFFICE VISIT (OUTPATIENT)
Dept: ORTHOPEDICS CLINIC | Facility: CLINIC | Age: 74
End: 2024-06-07
Payer: MEDICARE

## 2024-06-07 VITALS — BODY MASS INDEX: 28.59 KG/M2 | HEIGHT: 66.5 IN | WEIGHT: 180 LBS

## 2024-06-07 DIAGNOSIS — S46.001A INJURY OF RIGHT ROTATOR CUFF, INITIAL ENCOUNTER: Primary | ICD-10-CM

## 2024-06-07 PROCEDURE — 99214 OFFICE O/P EST MOD 30 MIN: CPT | Performed by: PHYSICIAN ASSISTANT

## 2024-06-07 NOTE — H&P
[facility] - ORTHOPEDICS  [address] [place] [address]  [phone]     NEW PATIENT VISIT - HISTORY AND PHYSICAL EXAMINATION     Name: Tootie Matamoros   MRN: [id number]  Date: 2024     CC: Right shoulder pain.    REFERRED BY: Migue Shook DO    HPI:   Tootie Matamoros is a very pleasant 73 year old right-hand dominant female who presents today for evaluation, consultation, and management of right shoulder pain for two months- without an injury or clear mechanism of trauma. She describes exercising and the next day had pain in the biceps region. She describe stiffness, and limited ROM. Pain is a 6/10 and notes less than 50% of normal function. She is retired.     PMH:   Past Medical History:    Allergic rhinitis    Arrhythmia    Essential hypertension       PAST SURGICAL HX:  Past Surgical History:   Procedure Laterality Date    Ep cardioversion 1x  06/23/2023         Hysterectomy               FAMILY HX:  Family History   Problem Relation Age of Onset    Hypertension Mother        ALLERGIES:  Patient has no known allergies.    MEDICATIONS:   Current Outpatient Medications   Medication Sig Dispense Refill    aspirin 81 MG Oral Tab EC Take 1 tablet (81 mg total) by mouth daily.      Mometasone Furoate 0.1 % External Solution Apply to scalp twice daily 60 mL 1    Irbesartan-hydroCHLOROthiazide 150-12.5 MG Oral Tab Take 1 tablet by mouth daily. 90 tablet 0    budeson-glycopyrrol-formoterol (BREZTRI AEROSPHERE) 160-9-4.8 MCG/ACT Inhalation Aerosol Inhale 2 puffs into the lungs 2 (two) times daily. 10.7 g 5    HYDROCHLOROTHIAZIDE 12.5 MG Oral Tab TAKE 1 TABLET(12.5 MG) BY MOUTH DAILY 90 tablet 1    ipratropium-albuterol 0.5-2.5 (3) MG/3ML Inhalation Solution Take 3 mL by nebulization 4 (four) times daily. 360 mL 3    Respiratory Therapy Supplies (NEBULIZER/TUBING/MOUTHPIECE) Does not apply Kit 1 kit As Directed. 1 each 0    ipratropium 0.02 % Inhalation Solution  Take 2.5 mL (500 mcg total) by nebulization 4 (four) times daily. 300 mL 5    Respiratory Therapy Supplies (NEBULIZER/TUBING/MOUTHPIECE) Does not apply Kit 1 kit As Directed. 1 each 0    Fexofenadine HCl (ALLEGRA OR) Take 1 tablet by mouth daily as needed.      Pseudoephedrine HCl (PSEUDO OR) Take 1 tablet by mouth daily as needed.      Cholecalciferol (VITAMIN D3) 75 MCG (3000 UT) Oral Tab Take 1 tablet by mouth daily.         ROS: A comprehensive 14 point review of systems was performed and was negative aside from the aforementioned per history of present illness.    SOCIAL HX:  Social History     Occupational History    Not on file   Tobacco Use    Smoking status: Never    Smokeless tobacco: Never   Vaping Use    Vaping status: Never Used   Substance and Sexual Activity    Alcohol use: Yes     Alcohol/week: 2.0 standard drinks of alcohol     Types: 1 Glasses of wine, 1 Standard drinks or equivalent per week     Comment: CAGE 11/16/20    Drug use: Never    Sexual activity: Not on file        PE:   Vitals:    06/07/24 1029   Weight: 180 lb (81.6 kg)   Height: 5' 6.5\" (1.689 m)     Estimated body mass index is 28.62 kg/m² as calculated from the following:    Height as of this encounter: 5' 6.5\" (1.689 m).    Weight as of this encounter: 180 lb (81.6 kg).    Physical Exam  Constitutional:       Appearance: Normal appearance.   HENT:      Head: Normocephalic and atraumatic.   Eyes:      Extraocular Movements: Extraocular movements intact.   Neck:      Musculoskeletal: Normal range of motion and neck supple.   Cardiovascular:      Pulses: Normal pulses.   Pulmonary:      Effort: Pulmonary effort is normal. No respiratory distress.   Abdominal:      General: There is no distension.   Skin:     General: Skin is warm.      Capillary Refill: Capillary refill takes less than 2 seconds.      Findings: No bruising.   Neurological:      General: No focal deficit present.      Mental Status: Alert.   Psychiatric:         Mood  and Affect: Mood normal.     Examination of the right shoulder demonstrates:     Skin is intact, warm and dry.   Cervical:  Full ROM  Spurling's  Negative    Deformity:   none  Atrophy:   none    Scapular winging: Negative    Palpation:     AC Joint  Negative  Biceps Tendon  Positive  Greater Tuberosity Positive    ROM:   Forward Flexion:  90°  Abduction:   full and symmetric  External Rotation:  full and symmetric  Internal Rotation:  full and symmetric    Rotator Cuff Strength:   Supraspinatus:   4/5  Subscapularis:   5/5  Infraspinatus/Teres: 5/5    Provocative Tests:   Cunningham:   Positive  Speed's:   Negative  Hardin's:   Negative  Lift-off:   Negative  Apprehension:  Negative  Sulcus Sign:   Negative    Neurovascular Upper Extremity (Bilateral)  Motor:    5/5 EPL, Finger Abduction, , Pinch, Deltoid  Sensation:   intact to light touch median, ulnar, radial and axillary nerve  Circulation:   Normal, 2+ radial pulse    The contralateral upper extremity is without limitation in range of motion or strength, no positive provocative maneuvers.     Radiographic Examination/Diagnostics:    I personally viewed, independently interpreted and radiology report was reviewed.      XR SHOULDER, COMPLETE (MIN 2 VIEWS), RIGHT (CPT=73030)    Result Date: 5/30/2024  PROCEDURE:  XR SHOULDER, COMPLETE (MIN 2 VIEWS), RIGHT (CPT=73030)  TECHNIQUE:  Multiple views were obtained.  COMPARISON:  None.  INDICATIONS:  M25.511 Acute pain of right shoulder M79.601 Right arm pain  PATIENT STATED HISTORY: (As transcribed by Technologist)  Patient stated right shoulder pain for 5 weeks.    FINDINGS:  Moderate AC joint and mild glenohumeral joint arthritis.  No fracture, expansile lesion or erosion.  Smooth contour of the humeral head.  No findings of calcific tendinopathy.             CONCLUSION:  Shoulder arthritis.   LOCATION:  [facility]   Dictated by (CST): Sonido Le MD on 5/30/2024 at 1:34 PM     Finalized by (CST): Sonido Le MD on  5/30/2024 at 1:34 PM         IMPRESSION: Tootie Matamoros is a 73 year old Right hand dominant female with a right rotator cuff injury.     PLAN:   We had a detailed discussion outlining the etiology, anatomy, pathophysiology, and natural history of the patient's findings. Imaging was reviewed in detail and correlated to a 3-dimensional model of the patient's pathology.     We reviewed the treatment of this disease condition.  In light of the acute traumatic incident, loss of normal function, and  failure to progress conservatively we recommend an MRI to evaluate the integrity of the patient's right rotator cuff. The patient will follow up after imaging.   Differential diagnosis includes but not limited to: rotator cuff/labral pathology, impingement, tendinopathy, cartilage injury/loose body, bone marrow edema, and osteoarthritis.     External records were also reviewed for pertinent historical findings contributing to the patients undiagnosed new problem with uncertain prognosis.     The patient had the opportunity to ask questions, and all questions were answered appropriately.         FOLLOW-UP:   Return to clinic following completion of MRI to review scan and findings.           Hector Kc [facility], PA-C Orthopedic Surgery / Sports Medicine Specialist  [facility]  [address] [place] [address] [id number] [place] [email or web address]  [email or web address]  t: [phone]  o: [phone]  f: [phone]    This note was dictated using Dragon software.  While it was briefly proofread prior to completion, some grammatical, spelling, and word choice errors due to dictation may still occur.

## 2024-06-10 ENCOUNTER — HOSPITAL ENCOUNTER (OUTPATIENT)
Dept: MRI IMAGING | Facility: HOSPITAL | Age: 74
Discharge: HOME | End: 2024-06-10
Attending: PHYSICIAN ASSISTANT
Payer: MEDICARE

## 2024-06-10 DIAGNOSIS — S46.001A INJURY OF RIGHT ROTATOR CUFF, INITIAL ENCOUNTER: ICD-10-CM

## 2024-06-10 PROCEDURE — 73221 MRI JOINT UPR EXTREM W/O DYE: CPT | Performed by: PHYSICIAN ASSISTANT

## 2024-06-11 ENCOUNTER — OFFICE VISIT (OUTPATIENT)
Facility: CLINIC | Age: 74
End: 2024-06-11
Payer: MEDICARE

## 2024-06-11 VITALS
RESPIRATION RATE: 16 BRPM | WEIGHT: 181 LBS | OXYGEN SATURATION: 99 % | SYSTOLIC BLOOD PRESSURE: 102 MMHG | DIASTOLIC BLOOD PRESSURE: 56 MMHG | HEART RATE: 58 BPM | HEIGHT: 66.5 IN | BODY MASS INDEX: 28.75 KG/M2

## 2024-06-11 DIAGNOSIS — R06.02 SHORTNESS OF BREATH: Primary | ICD-10-CM

## 2024-06-11 PROCEDURE — 99213 OFFICE O/P EST LOW 20 MIN: CPT | Performed by: INTERNAL MEDICINE

## 2024-06-11 NOTE — PROGRESS NOTES
[facility] Pulmonary Follow Up Note    Chief Complaint:  Chief Complaint   Patient presents with    Follow - Up     2 month f/u pt states dyspnea has gotten better not having to use nebs as often        History of Present Illness:  Tootie Matamoros is a 73 year old female who presents today for follow up of shortness of breath.  Been an ongoing work up including PFTs with possible dec DLCO (corrects to normal), normal CPET    Interval history:  Since last visit, patients breathing is overall better.  Using nebulizers occasionally, having some seasonal allergies.      Past Medical History:   Past Medical History:    Allergic rhinitis    Arrhythmia    Essential hypertension        Past Surgical History:   Past Surgical History:   Procedure Laterality Date    Ep cardioversion 1x  06/23/2023         Hysterectomy               Family Medical History:   Family History   Problem Relation Age of Onset    Hypertension Mother     Stroke Paternal Grandmother         Social History:   Social History     Socioeconomic History    Marital status:      Spouse name: Not on file    Number of children: Not on file    Years of education: Not on file    Highest education level: Not on file   Occupational History    Not on file   Tobacco Use    Smoking status: Never     Passive exposure: Never    Smokeless tobacco: Never   Vaping Use    Vaping status: Never Used   Substance and Sexual Activity    Alcohol use: Yes     Alcohol/week: 2.0 standard drinks of alcohol     Types: 1 Glasses of wine, 1 Standard drinks or equivalent per week     Comment: CAGE 20    Drug use: Never    Sexual activity: Not on file   Other Topics Concern    Caffeine Concern Yes    Exercise Yes    Seat Belt Not Asked    Special Diet Not Asked    Stress Concern Not Asked    Weight Concern Not Asked     Service Not Asked    Blood Transfusions Not Asked    Occupational Exposure Not Asked    Hobby Hazards Not Asked    Sleep Concern Not Asked    Back Care  Not Asked    Bike Helmet Not Asked    Self-Exams Not Asked   Social History Narrative    Not on file     Social Determinants of Health     Financial Resource Strain: Not on file   Food Insecurity: Not on file   Transportation Needs: Not on file   Physical Activity: High Risk (11/27/2023)    [email or web address]    Exercise Vital Sign     On average, how many days per week do you engage in moderate to strenuous exercise (like a brisk walk)?: 0 days     On average, how many minutes do you engage in exercise at this level?: 0 min   Stress: Not on file   Social Connections: Not on file   Housing Stability: Not on file        Medications:   Current Outpatient Medications   Medication Sig Dispense Refill    aspirin 81 MG Oral Tab EC Take 1 tablet (81 mg total) by mouth daily.      Mometasone Furoate 0.1 % External Solution Apply to scalp twice daily 60 mL 1    Irbesartan-hydroCHLOROthiazide 150-12.5 MG Oral Tab Take 1 tablet by mouth daily. 90 tablet 0    budeson-glycopyrrol-formoterol (BREZTRI AEROSPHERE) 160-9-4.8 MCG/ACT Inhalation Aerosol Inhale 2 puffs into the lungs 2 (two) times daily. 10.7 g 5    HYDROCHLOROTHIAZIDE 12.5 MG Oral Tab TAKE 1 TABLET(12.5 MG) BY MOUTH DAILY 90 tablet 1    ipratropium-albuterol 0.5-2.5 (3) MG/3ML Inhalation Solution Take 3 mL by nebulization 4 (four) times daily. 360 mL 3    Respiratory Therapy Supplies (NEBULIZER/TUBING/MOUTHPIECE) Does not apply Kit 1 kit As Directed. 1 each 0    ipratropium 0.02 % Inhalation Solution Take 2.5 mL (500 mcg total) by nebulization 4 (four) times daily. 300 mL 5    Respiratory Therapy Supplies (NEBULIZER/TUBING/MOUTHPIECE) Does not apply Kit 1 kit As Directed. 1 each 0    Fexofenadine HCl (ALLEGRA OR) Take 1 tablet by mouth daily as needed.      Pseudoephedrine HCl (PSEUDO OR) Take 1 tablet by mouth daily as needed.      Cholecalciferol (VITAMIN D3) 75 MCG (3000 UT) Oral Tab Take 1 tablet by  mouth daily.         Review of Systems: Review of Systems     Physical Exam:  /56 (BP Location: Left arm, Patient Position: Sitting, Cuff Size: adult)   Pulse 58   Resp 16   Ht 5' 6.5\" (1.689 m)   Wt 181 lb (82.1 kg)   SpO2 99%   BMI 28.78 kg/m²      Constitutional: alert, cooperative. No acute distress.  HEENT: Head NC/AT. Nares normal. Septum midline. Mucosa normal. No drainage or sinus tenderness.  Cardio: Regular rate and rhythm. Normal S1 and S2. No murmurs.   Respiratory: Thorax symmetrical with no labored breathing. clear to auscultation bilaterally  Extremities: No clubbing or cyanosis. No BLE edema.    Neurologic: A&Ox3. No gross motor deficits.  Skin: Warm, dry  Psych: Calm, cooperative. Pleasant affect.    Results:  Personally reviewed  MRI SHOULDER, RIGHT (PVE=49408)  Narrative: PROCEDURE:  MRI SHOULDER, RIGHT (CPT=73221)     COMPARISON:  [facility], XR SHOULDER, COMPLETE (MIN 2 VIEWS), RIGHT (CPT=73030), 5/30/2024, 11:40 AM.     INDICATIONS:  73-year-old female presents with a suspected exercised induced injury to the right shoulder, presenting with pain and limited range of motion for 2 months.     TECHNIQUE:  Multiplanar imaging of the shoulder including oblique coronal, axial and sagittal imaging was acquired including proton density fat suppression technique. Images were performed without intravenous gadolinium.     PATIENT STATED HISTORY: (As transcribed by Technologist)  Right shoulder pain with limited range of motion. x 2 months.  Possible injury exercising, pain started next day.         FINDINGS:    ROTATOR CUFF:  There is a severe rotator cuff tendinopathy of the supraspinatus tendon with a full-thickness tear that approximates 1.0 x 2.2 cm in transverse and AP dimensions respectively.  The remainder of the rotator cuff is intact.  MUSCULATURE:  No acute muscular strains or edema noted.  There is selective moderate atrophy and fatty replacement of the teres minor muscle.  The  adjacent quadrilateral space appears within normal limits without evidence of space-occupying lesions or   inflammatory changes.  AC JOINT/ACROMION:  Moderate AC joint arthropathy.  The acromion is type 2 with subacromial enthesopathy, consistent with extrinsic rotator cuff impingement.  There is mild to moderate subacromial/subdeltoid bursitis.  BICEPS/LABRAL COMPLEX:  The long head of the biceps tendon is intact and unremarkable.  The biceps labral anchor and glenoid labrum reveal mild degenerative changes extending from the anchor into the anterior superior and posterior superior quadrants.    Glenohumeral ligaments are unremarkable.  GLENOHUMERAL JOINT:  No significant arthritis or acute injury.  Normal marrow and cortical signal.  Unremarkable capsular insertions.  No effusion.                     Impression: CONCLUSION:    1. Severe rotator cuff tendinopathy of the supraspinatus tendon with full-thickness supraspinatus tear measuring 1.0 x 2.2 cm.  2. Severe selective atrophy of the teres minor muscle with fatty infiltration/fatty replacement.  This can be seen in patients with quadrilateral space syndrome.  However as seen on this exam, no space-occupying lesions of the quadrilateral space or   inflammatory changes of the quadrilateral space are identified.  3. Moderate AC joint arthropathy with type 2 acromion and subacromial enthesopathy consistent with extrinsic rotator cuff impingement.  Associated mild to moderate subacromial/subdeltoid bursitis.          LOCATION:  [facility]        Dictated by (CST): Leslie Roberts DO on 6/10/2024 at 8:46 AM       Finalized by (CST): Leslie Roberts DO on 6/10/2024 at 9:02 AM         PFTs:       No data to display                   No data to display                    WBC: 7, done on 10/23/2023.  HGB: 13.3, done on 10/23/2023.  PLT: 237, done on 10/23/2023.     Glucose: 101, done on 10/23/2023.  Cr: 0.95, done on 10/23/2023.  GFR(AA): 74, done on 9/2/2021.  GFR (non-AA): 64,  done on 9/2/2021.  CA: 9, done on 10/23/2023.  Na: 142, done on 10/23/2023.  K: 3.6, done on 10/23/2023.  Cl: 111, done on 10/23/2023.  CO2: 27, done on 10/23/2023.  Last ALB was 3.7% done on 10/23/2023.     MRI SHOULDER, RIGHT (CPT=73221)    Result Date: 6/10/2024  CONCLUSION:  1. Severe rotator cuff tendinopathy of the supraspinatus tendon with full-thickness supraspinatus tear measuring 1.0 x 2.2 cm. 2. Severe selective atrophy of the teres minor muscle with fatty infiltration/fatty replacement.  This can be seen in patients with quadrilateral space syndrome.  However as seen on this exam, no space-occupying lesions of the quadrilateral space or inflammatory changes of the quadrilateral space are identified. 3. Moderate AC joint arthropathy with type 2 acromion and subacromial enthesopathy consistent with extrinsic rotator cuff impingement.  Associated mild to moderate subacromial/subdeltoid bursitis.    LOCATION:  [facility]   Dictated by (CST): Leslie Roberts DO on 6/10/2024 at 8:46 AM     Finalized by (CST): Leslie Roberts DO on 6/10/2024 at 9:02 AM       XR SHOULDER, COMPLETE (MIN 2 VIEWS), RIGHT (CPT=73030)    Result Date: 5/30/2024  CONCLUSION:  Shoulder arthritis.   LOCATION:  [facility]   Dictated by (CST): Sonido Le MD on 5/30/2024 at 1:34 PM     Finalized by (CST): Sonido Le MD on 5/30/2024 at 1:34 PM         Assessment/Plan:  #1. Shortness of breath  All of the above work up has been negative  Likely cause is deconditoining  Encouraged to exercise as much as possible      Return in about 6 months (around 12/11/2024).    I spent 20 minutes obtaining and reviewing records, preparing for the visit including reviewing chart and prior testing, face to face time examining the patient and obtaining history, counseling, arranging and reviewing office-based testing, independently reviewing relevant studies and documentation exclusive of other billable procedures.      Sally Shanks MD  6/11/2024

## 2024-06-19 ENCOUNTER — OFFICE VISIT (OUTPATIENT)
Dept: ORTHOPEDICS CLINIC | Facility: CLINIC | Age: 74
End: 2024-06-19

## 2024-06-19 DIAGNOSIS — S46.011A TRAUMATIC COMPLETE TEAR OF RIGHT ROTATOR CUFF, INITIAL ENCOUNTER: Primary | ICD-10-CM

## 2024-06-19 DIAGNOSIS — M75.41 SUBACROMIAL IMPINGEMENT OF RIGHT SHOULDER: ICD-10-CM

## 2024-06-19 DIAGNOSIS — M19.019 AC JOINT ARTHROPATHY: ICD-10-CM

## 2024-06-19 DIAGNOSIS — M75.21 BICEPS TENDINITIS OF RIGHT UPPER EXTREMITY: ICD-10-CM

## 2024-06-19 PROCEDURE — 99215 OFFICE O/P EST HI 40 MIN: CPT | Performed by: ORTHOPAEDIC SURGERY

## 2024-06-19 NOTE — H&P
Orthopaedic Surgery  [address] [place] [address]  [phone]     PRE SURGICAL - HISTORY AND PHYSICAL EXAMINATION     Name: Tootie Matamoros   MRN: [id number]  Date: 2024     CC: Right shoulder pain and weakness in the setting of rotator cuff pathology.     HPI:   Tootie Matamoros is a very pleasant 73 year old right-hand dominant female who presents today for evaluation of MRI scan of the shoulder and discussion regarding definitive management plan.     To summarize: right shoulder pain for two months- without an injury or clear mechanism of trauma. She describes exercising and the next day had pain in the biceps region. She describe stiffness, and limited ROM. Pain is a 6/10 and notes less than 50% of normal function. She is retired.      At the patient's last visit, evaluation was performed by my colleague Hector Kc PA-C who recommended an MRI. The patient was then referred to me for consultation, and presents today for further discussion.       PMH:   Past Medical History:    Allergic rhinitis    Arrhythmia    Essential hypertension       PAST SURGICAL HX:  Past Surgical History:   Procedure Laterality Date    Ep cardioversion 1x  06/23/2023         Hysterectomy               FAMILY HX:  Family History   Problem Relation Age of Onset    Hypertension Mother     Stroke Paternal Grandmother        ALLERGIES:  Patient has no known allergies.    MEDICATIONS:   Current Outpatient Medications   Medication Sig Dispense Refill    aspirin 81 MG Oral Tab EC Take 1 tablet (81 mg total) by mouth daily.      Mometasone Furoate 0.1 % External Solution Apply to scalp twice daily 60 mL 1    Irbesartan-hydroCHLOROthiazide 150-12.5 MG Oral Tab Take 1 tablet by mouth daily. 90 tablet 0    budeson-glycopyrrol-formoterol (BREZTRI AEROSPHERE) 160-9-4.8 MCG/ACT Inhalation Aerosol Inhale 2 puffs into the lungs 2 (two) times daily. 10.7 g 5    HYDROCHLOROTHIAZIDE 12.5 MG Oral Tab TAKE 1 TABLET(12.5 MG) BY  MOUTH DAILY 90 tablet 1    ipratropium-albuterol 0.5-2.5 (3) MG/3ML Inhalation Solution Take 3 mL by nebulization 4 (four) times daily. 360 mL 3    Respiratory Therapy Supplies (NEBULIZER/TUBING/MOUTHPIECE) Does not apply Kit 1 kit As Directed. 1 each 0    ipratropium 0.02 % Inhalation Solution Take 2.5 mL (500 mcg total) by nebulization 4 (four) times daily. 300 mL 5    Respiratory Therapy Supplies (NEBULIZER/TUBING/MOUTHPIECE) Does not apply Kit 1 kit As Directed. 1 each 0    Fexofenadine HCl (ALLEGRA OR) Take 1 tablet by mouth daily as needed.      Pseudoephedrine HCl (PSEUDO OR) Take 1 tablet by mouth daily as needed.      Cholecalciferol (VITAMIN D3) 75 MCG (3000 UT) Oral Tab Take 1 tablet by mouth daily.         ROS: A comprehensive 14 point review of systems was performed and was negative aside from the aforementioned per history of present illness.    SOCIAL HX:  Social History     Tobacco Use    Smoking status: Never     Passive exposure: Never    Smokeless tobacco: Never   Substance Use Topics    Alcohol use: Yes     Alcohol/week: 2.0 standard drinks of alcohol     Types: 1 Glasses of wine, 1 Standard drinks or equivalent per week     Comment: CAGE 11/16/20       PE:   There were no vitals filed for this visit.  Estimated body mass index is 28.78 kg/m² as calculated from the following:    Height as of 6/11/24: 5' 6.5\" (1.689 m).    Weight as of 6/11/24: 181 lb (82.1 kg).    Physical Exam  Constitutional:       Appearance: Normal appearance.   HENT:      Head: Normocephalic and atraumatic.   Eyes:      Extraocular Movements: Extraocular movements intact.   Neck:      Musculoskeletal: Normal range of motion and neck supple.   Cardiovascular:      Pulses: Normal pulses.   Pulmonary:      Effort: Pulmonary effort is normal. No respiratory distress.   Abdominal:      General: There is no distension.   Skin:     General: Skin is warm.      Capillary Refill: Capillary refill takes less than 2 seconds.       Findings: No bruising.   Neurological:      General: No focal deficit present.      Mental Status: Alert.   Psychiatric:         Mood and Affect: Mood normal.     Examination of the right shoulder demonstrates:     Skin is intact, warm and dry.   Cervical:  Full ROM  Spurling's  Negative    Deformity:   none  Atrophy:   mild    Scapular winging: Negative    Palpation:     AC Joint   Negative  Biceps Tendon  Positive  Greater Tuberosity Positive    ROM:   Forward Flexion:  120°  Abduction:   90°  External Rotation:  60°  Internal Rotation:  thoracolumbar junction    Rotator Cuff Strength:   Supraspinatus:   3/5  Subscapularis:   5/5  Infraspinatus/Teres: 5-/5    Provocative Tests:   Cunningham:   Positive  Speed's:   Negative  Redwater's:   Positive  Lift-off:    Negative  Apprehension:  Negative  Sulcus Sign:   Negative    Neurovascular Upper Extremity (Bilateral)  Motor:    5/5 EPL, Finger Abduction, , Pinch, Deltoid  Sensation:   intact to light touch median, ulnar, radial and axillary nerve  Circulation:   Normal, 2+ radial pulse    The contralateral upper extremity is without limitation in range of motion or strength, no positive provocative maneuvers.     Radiographic Examination/Diagnostics:    XR and MRI of the shoulder personally viewed, independently interpreted and radiology report was reviewed.    MRI SHOULDER, RIGHT (CPT=73221)    Result Date: 6/10/2024  PROCEDURE:  MRI SHOULDER, RIGHT (CPT=73221)  COMPARISON:  [facility], XR SHOULDER, COMPLETE (MIN 2 VIEWS), RIGHT (CPT=73030), 5/30/2024, 11:40 AM.  INDICATIONS:  73-year-old female presents with a suspected exercised induced injury to the right shoulder, presenting with pain and limited range of motion for 2 months.  TECHNIQUE:  Multiplanar imaging of the shoulder including oblique coronal, axial and sagittal imaging was acquired including proton density fat suppression technique. Images were performed without intravenous gadolinium.  PATIENT STATED  HISTORY: (As transcribed by Technologist)  Right shoulder pain with limited range of motion. x 2 months.  Possible injury exercising, pain started next day.    FINDINGS:  ROTATOR CUFF:  There is a severe rotator cuff tendinopathy of the supraspinatus tendon with a full-thickness tear that approximates 1.0 x 2.2 cm in transverse and AP dimensions respectively.  The remainder of the rotator cuff is intact. MUSCULATURE:  No acute muscular strains or edema noted.  There is selective moderate atrophy and fatty replacement of the teres minor muscle.  The adjacent quadrilateral space appears within normal limits without evidence of space-occupying lesions or inflammatory changes. AC JOINT/ACROMION:  Moderate AC joint arthropathy.  The acromion is type 2 with subacromial enthesopathy, consistent with extrinsic rotator cuff impingement.  There is mild to moderate subacromial/subdeltoid bursitis. BICEPS/LABRAL COMPLEX:  The long head of the biceps tendon is intact and unremarkable.  The biceps labral anchor and glenoid labrum reveal mild degenerative changes extending from the anchor into the anterior superior and posterior superior quadrants.  Glenohumeral ligaments are unremarkable. GLENOHUMERAL JOINT:  No significant arthritis or acute injury.  Normal marrow and cortical signal.  Unremarkable capsular insertions.  No effusion.             CONCLUSION:    1. Severe rotator cuff tendinopathy of the supraspinatus tendon with full-thickness supraspinatus tear measuring 1.0 x 2.2 cm.   2. Severe selective atrophy of the teres minor muscle with fatty infiltration/fatty replacement.  This can be seen in patients with quadrilateral space syndrome.  However as seen on this exam, no space-occupying lesions of the quadrilateral space or inflammatory changes of the quadrilateral space are identified.   3. Moderate AC joint arthropathy with type 2 acromion and subacromial enthesopathy consistent with extrinsic rotator cuff impingement.   Associated mild to moderate subacromial/subdeltoid bursitis.      LOCATION:  [place]   Dictated by (CST): Leslie Roberts DO on 6/10/2024 at 8:46 AM     Finalized by (CST): Leslie Roberts DO on 6/10/2024 at 9:02 AM       XR SHOULDER, COMPLETE (MIN 2 VIEWS), RIGHT (CPT=73030)    Result Date: 5/30/2024  PROCEDURE:  XR SHOULDER, COMPLETE (MIN 2 VIEWS), RIGHT (CPT=73030)  TECHNIQUE:  Multiple views were obtained.  COMPARISON:  None.  INDICATIONS:  M25.511 Acute pain of right shoulder M79.601 Right arm pain  PATIENT STATED HISTORY: (As transcribed by Technologist)  Patient stated right shoulder pain for 5 weeks.    FINDINGS:  Moderate AC joint and mild glenohumeral joint arthritis.  No fracture, expansile lesion or erosion.  Smooth contour of the humeral head.  No findings of calcific tendinopathy.             CONCLUSION:  Shoulder arthritis.   LOCATION:  [place]   Dictated by (CST): Sonido Le MD on 5/30/2024 at 1:34 PM     Finalized by (CST): Sonido Le MD on 5/30/2024 at 1:34 PM         IMPRESSION: Tootie Matamoros is a 73 year old female with Right shoulder full thickness supraspinatus tear and AC joint arthropathy sustained 2 months ago.     The patient has failed an appropriate course of nonsurgical conservative management and is a candidate for arthroscopic rotator cuff repair, subacromial decompression, biceps tenodesis, and distal clavicle excision.    PLAN:   We had a detailed discussion outlining the etiology, anatomy, pathophysiology, and natural history of rotator cuff pathology of the shoulder. Imaging was reviewed in detail and correlated to a 3-dimensional model of the shoulder.     I had a lengthy discussion with Tootie about the diagnosis and options, both surgical and nonsurgical. I have recommended that we proceed with arthroscopic rotator cuff repair and likely biceps tenodesis as we agree surgical intervention would likely offer the best opportunity for symptomatic relief and functional recovery. I used  imaging studies and a 3-dimensional model to outline her pathology, as well as general surgical principles. We reviewed the risks associated with shoulder arthroscopy.   In particular we discussed risks that include, but are not limited to infection, blood loss, potential transient or permanent injury to nerves or blood vessels, joint stiffness, persistent pain, need for future operation, failure of healing, wound complications, failure of therapeutic intervention, risk of re-injury, fixation failure, deep vein thrombosis and pulmonary embolism. We discussed the proposed rehabilitation timeline as well as expected postoperative restrictions.     Most post-surgical patients after rotator cuff repair utilize a shoulder immobilizer/sling for approximately ~4 weeks.  Physical therapy is initiated immediately postsurgically with initial passive range of motion, followed by active assisted range of motion, and ultimately active range of motion after the 6 to 8-week bethel.  After the 3-month bethel postsurgically the restrictions are lifted and continued strengthening is recommended.    Tootie voiced a good understanding of treatment options, risks and benefits, postoperative instructions, rehabilitation timeline, and restrictions. She was given the opportunity to ask questions, which were all answered to the best of my ability and to her satisfaction. Tootie will work with my office to arrange a time for surgery and obtain any medical clearance information necessary. My pre-operative information packet, which details the process and answers many FAQ's will be provided. She was encouraged to call the office with any further questions or concerns.  I spent 40 minutes in preparation to see the patient, counseling/education of relevant pathology, discussing imaging results, surgical counseling, and care coordination.      FOLLOW-UP:   She is likely to return in July for further discussion of management plan.       Madiha Torres MD   Knee, Shoulder, & Elbow Surgery / Sports Medicine Specialist  Orthopaedic Surgery  [address] [place] [id number]   [email or web address]  [email or web address]  t: [phone]  o: [phone]  f: [phone]    This note was dictated using Dragon software.  While it was briefly proofread prior to completion, some grammatical, spelling, and word choice errors due to dictation may still occur.

## 2024-06-26 ENCOUNTER — OFFICE VISIT (OUTPATIENT)
Dept: PHYSICAL MEDICINE AND REHAB | Facility: CLINIC | Age: 74
End: 2024-06-26

## 2024-06-26 ENCOUNTER — TELEPHONE (OUTPATIENT)
Dept: PHYSICAL MEDICINE AND REHAB | Facility: CLINIC | Age: 74
End: 2024-06-26

## 2024-06-26 VITALS — HEIGHT: 66.5 IN | WEIGHT: 181 LBS | BODY MASS INDEX: 28.75 KG/M2

## 2024-06-26 DIAGNOSIS — M25.511 ACUTE PAIN OF RIGHT SHOULDER: ICD-10-CM

## 2024-06-26 DIAGNOSIS — M12.811 ROTATOR CUFF ARTHROPATHY OF RIGHT SHOULDER: Primary | ICD-10-CM

## 2024-06-26 NOTE — PROCEDURES
PROCEDURE NOTE    DIAGNOSIS  1.  Right subacromial/subdeltoid bursitis  2.  Right shoulder pain    PROCEDURE  Ultrasound guided Right subacromial/subdeltoid bursa injection    PERFORMING PHYSICIAN  Alfredito Camarillo DO    PROCEDURE NOTE  Informed consent was obtained. Risks and benefits of the procedure were explained. The patient was placed in a supine position and the Right rotator cuff and subdeltoid bursa was identified under ultrasound over the lateral aspect of the shoulder using a linear transducer. Area was cleaned and prepped with Betadine x 3.Then using a 27-gauge 2 inch needle, the needle was guided into the sub-deltoid bursa under ultrasound using an in-plane approach. Then, a solution of 1 mL of 40mg/mL Triamcinolone mixed with 3 mL of 1% lidocaine and 3 mL of 0.25% bupivacaine was injected. Patient tolerated the procedure well without complications. Patient was given post-injection instructions.        Alfredito Camarillo DO  Interventional Spine and Sports Medicine Specialist   Physical Medicine and Rehabilitation  [address] [address]    [address] [place] [address]

## 2024-06-26 NOTE — TELEPHONE ENCOUNTER
Per CMS Guidelines -no authorization is required for Right subacromial bursa injection, ultrasound guidance CPT 80382,      Status: Authorization is not required based on medical necessity however is not a guarantee of payment and may be subject to review once claim is submitted-Covered Benefit     Injection done in office

## 2024-06-26 NOTE — PROGRESS NOTES
NEW PATIENT VISIT    CHIEF COMPLAINT  Right shoulder pain      HISTORY OF PRESENTING ILLNESS  Tootie Matamoros is a 73 year old female who presents for evaluation of Right shoulder pain.  Patient is referred by a friend coming in complaining of right shoulder weakness, pain, restricted range of motion in the setting of known rotator cuff arthropathy and partial-thickness tearing of the supraspinatus tendon.  She was recently seen by orthopedic surgery, Dr. Torres where surgical correction was recommended.  Patient states that she is unable to have surgery currently and would have to wait until the fall.  She is interested in any other conservative treatment options in the interim to improve her function and ability to use her right upper extremity.    We discussed interventional options including subacromial bursa injection as well as physical therapy and she is interested in both.    She states most of her pain and weakness is located in the right anterior shoulder with some extension to the cuff patch on the right side.  She has weakness and pain with overhead reach as well as cross body reach.  Quick motions also bother her.    Additionally she endorses some weakness in  strength on the right side.      Chief Complaint   Patient presents with    Shoulder Pain     New right handed patient, referred self-referred comes in with R Anterior shoulder and upper arm aching/stabbing pain. Admits T/N in LUE digits 3-4. Rates pain 4/10. Completed MRI of R shoulder. Denies PT. Symptoms began 2024, states she started doing walking exercises then noticed her pain the next day. Denies injections. Denies medication.         PAST MEDICAL HISTORY  Past Medical History:    Allergic rhinitis    Arrhythmia    Essential hypertension       PAST SURGICAL HISTORY  Past Surgical History:   Procedure Laterality Date    Ep cardioversion 1x  06/23/2023         Hysterectomy               MEDICATIONS  Current Outpatient Medications  on File Prior to Visit   Medication Sig Dispense Refill    aspirin 81 MG Oral Tab EC Take 1 tablet (81 mg total) by mouth daily.      Mometasone Furoate 0.1 % External Solution Apply to scalp twice daily 60 mL 1    Irbesartan-hydroCHLOROthiazide 150-12.5 MG Oral Tab Take 1 tablet by mouth daily. 90 tablet 0    budeson-glycopyrrol-formoterol (BREZTRI AEROSPHERE) 160-9-4.8 MCG/ACT Inhalation Aerosol Inhale 2 puffs into the lungs 2 (two) times daily. 10.7 g 5    HYDROCHLOROTHIAZIDE 12.5 MG Oral Tab TAKE 1 TABLET(12.5 MG) BY MOUTH DAILY 90 tablet 1    ipratropium-albuterol 0.5-2.5 (3) MG/3ML Inhalation Solution Take 3 mL by nebulization 4 (four) times daily. 360 mL 3    ipratropium 0.02 % Inhalation Solution Take 2.5 mL (500 mcg total) by nebulization 4 (four) times daily. 300 mL 5    Fexofenadine HCl (ALLEGRA OR) Take 1 tablet by mouth daily as needed.      Pseudoephedrine HCl (PSEUDO OR) Take 1 tablet by mouth daily as needed.      Cholecalciferol (VITAMIN D3) 75 MCG (3000 UT) Oral Tab Take 1 tablet by mouth daily.      Respiratory Therapy Supplies (NEBULIZER/TUBING/MOUTHPIECE) Does not apply Kit 1 kit As Directed. (Patient not taking: Reported on 6/26/2024) 1 each 0    Respiratory Therapy Supplies (NEBULIZER/TUBING/MOUTHPIECE) Does not apply Kit 1 kit As Directed. (Patient not taking: Reported on 6/26/2024) 1 each 0     No current facility-administered medications on file prior to visit.       ALLERGIES  No Known Allergies    SOCIAL HISTORY   reports that she has never smoked. She has never been exposed to tobacco smoke. She has never used smokeless tobacco. She reports current alcohol use of about 2.0 standard drinks of alcohol per week. She reports that she does not use drugs.    FAMILY HISTORY  Family History   Problem Relation Age of Onset    Hypertension Mother     Stroke Paternal Grandmother        REVIEW OF SYSTEMS  Complete review of systems was performed and was negative except for those items stated in the  History of Presenting Illness and Past Medical/Surgical History.    PHYSICAL EXAMINATION  GENERAL:  In no acute distress. Well-developed and well nourished.   SKIN: No rashes or open wounds involving bilateral upper extremities and neck.  NEUROLOGIC:   Strength: 5/5 bilaterally with shoulder abduction, external rotation, internal rotation, elbow flexion, elbow extension, wrist extension, FDI, ADM, EIP and APB.   Sensation: intact light touch sensation throughout bilateral upper extremities.   Reflexes: intact and symmetric in bilateral upper extremities. Warren’s negative.   MUSCULOSKELETAL:  Inspection: shoulders in protracted positions, head forward alignment. No scapular winging or muscular atrophy.   Palpation: tenderness was present over anterior shoulder capsule on the right side.  Empty can positive  Resisted external rotation positive  Resisted internal rotation has good strength.  Spurling's maneuver is negative.   strength slightly decreased on the right side.    REVIEW OF PRIOR X-RAYS/STUDIES  MRI shoulder dated 6/10/24 independently reviewed this reveals severe rotator cuff tendinopathy in the supraspinatus tendon, there is a full-thickness tear with atrophy of the teres minor.    IMPRESSION/DIAGNOSIS  Encounter Diagnoses   Name Primary?    Rotator cuff arthropathy of right shoulder Yes    Acute pain of right shoulder      Quadrangular space syndrome?  Cervical radiculopathy?  TREATMENT/PLAN  A few things on this patient's differential, the top of the list is rotator cuff arthropathy and symptomatic rotator cuff dysfunction.  We discussed trial of subacromial bursa injection and she is interested in proceeding.  This was performed with ultrasound guidance in the office today.    Additionally patient with started in physical therapy working on shoulder range of motion and strengthening.    If her  strength weakness persists, would consider right upper extremity EMG.    She will follow-up with me  in about 2 to 3 weeks.    Education was provided regarding the above impression/diagnosis and treatment options/plan were discussed.  All questions were answered during today's visit.  Patient will contact clinic if any other questions or concerns.            Alfredito Camarillo DO  Interventional Spine and Sports Medicine Specialist   Physical Medicine and Rehabilitation  [address] [address]    [address] [place] [address]

## 2024-07-01 ENCOUNTER — TELEPHONE (OUTPATIENT)
Dept: ORTHOPEDICS CLINIC | Facility: CLINIC | Age: 74
End: 2024-07-01

## 2024-07-01 ENCOUNTER — OFFICE VISIT (OUTPATIENT)
Dept: ORTHOPEDICS CLINIC | Facility: CLINIC | Age: 74
End: 2024-07-01
Payer: MEDICARE

## 2024-07-01 VITALS — BODY MASS INDEX: 28.93 KG/M2 | HEIGHT: 66 IN | WEIGHT: 180 LBS

## 2024-07-01 DIAGNOSIS — M19.019 AC JOINT ARTHROPATHY: ICD-10-CM

## 2024-07-01 DIAGNOSIS — M75.41 SUBACROMIAL IMPINGEMENT OF RIGHT SHOULDER: ICD-10-CM

## 2024-07-01 DIAGNOSIS — S46.011A TRAUMATIC COMPLETE TEAR OF RIGHT ROTATOR CUFF, INITIAL ENCOUNTER: Primary | ICD-10-CM

## 2024-07-01 DIAGNOSIS — M75.21 BICEPS TENDINITIS OF RIGHT UPPER EXTREMITY: ICD-10-CM

## 2024-07-01 PROCEDURE — 99215 OFFICE O/P EST HI 40 MIN: CPT | Performed by: ORTHOPAEDIC SURGERY

## 2024-07-01 NOTE — TELEPHONE ENCOUNTER
Date of Surgery:        Post Op Appt:      Case ID:     Notes: Interested in 2024, thanks!             OR BOOKING SHEET SHOULDER  Location: [x] [place]                    [x] [facility]  Name: Tootie Matamoros  MRN: [id number]   : 1950  Diagnos  [x] Traumatic complete tear of right rotator cuff, initial encounter [S46.011A]  Disposition:    [x] Ambulatory  [] Overnight for PARVIN  [] Overnight for observation and pain control  [] Inpatient procedure     Operative Time Required:  2 hours ([place])   Antibiotics: 2 g cefazolin within 60 minutes of surgical incision  Procedure:   Laterality:                  [x] RIGHT                  [] LEFT                   [] BILATERAL  Procedures:                    [x] Shoulder Arthroscopy                                 [x] Rotator Cuff Repair (05209)  [x] Arthroscopic Biceps Tenodesis (49318)  [x] Subacromial Decompression (05175)  [x] Distal Clavicle Excision (95033)  [x] Extensive Debridement (06405)     [] SLAP repair (54932)  [] Capsulorraphy / Labrum Repair (41595)     Additional info:   [x] PCP Clearance Needed  [] MRSA  [] C-Arm  [x] TXA at time surgery  [x] Physical Therapy External [facility]  [x] DME Rx Needed  [] Appt with Dr. Torres needed  Implants needed: Arthrex, Skylar  Positioning Equipment: Beach Chair SetupMina

## 2024-07-01 NOTE — PROGRESS NOTES
OR BOOKING SHEET SHOULDER  Location: [x] [place]   [x] [facility]  Name: Tootie Matamoros  MRN: [id number]   : 1950  Diagnos  [x] Traumatic complete tear of right rotator cuff, initial encounter [S46.011A]  Disposition:    [x] Ambulatory  [] Overnight for PARVIN  [] Overnight for observation and pain control  [] Inpatient procedure    Operative Time Required:  2 hours ([place])   Antibiotics: 2 g cefazolin within 60 minutes of surgical incision  Procedure:   Laterality: [x] RIGHT [] LEFT                  [] BILATERAL  Procedures:   [x] Shoulder Arthroscopy    [x] Rotator Cuff Repair (60754)  [x] Arthroscopic Biceps Tenodesis (05736)  [x] Subacromial Decompression (69363)  [x] Distal Clavicle Excision (61981)  [x] Extensive Debridement (05896)    [] SLAP repair (25005)  [] Capsulorraphy / Labrum Repair (10437)    Additional info:   [x] PCP Clearance Needed  [] MRSA  [] C-Arm  [x] TXA at time surgery  [x] Physical Therapy External [facility]  [x] DME Rx Needed  [] Appt with Dr. Torres needed  Implants needed: Arthrex, Skylar  Positioning Equipment: Beach Chair Setup, Mina

## 2024-07-01 NOTE — H&P
Orthopaedic Surgery  [address] [place] [id number]  [phone]     PRE SURGICAL - HISTORY AND PHYSICAL EXAMINATION     Name: Tootie Matamoros   MRN: [id number]  Date: 2024     CC: Right shoulder pain and weakness in the setting of rotator cuff pathology.     HPI:   Tootie Matamoros is a very pleasant 73 year old right-hand dominant female who presents today for evaluation of MRI scan of the shoulder and discussion regarding definitive management plan.     To summarize: right shoulder pain since 2024- without an injury or clear mechanism of trauma. She describes exercising and the next day had pain in the biceps region. She describe stiffness, and limited ROM. Pain is a 4/10 and notes less than 50% of normal function. She is retired.     At her last visit, we discussed the option for arthroscopic rotator cuff repair. At the time, she decided to defer surgery. Since then, she received a cortisone injection in the right shoulder by Dr. Camarillo and has initiated physical therapy. Patient is here today for surgical discussion.    PMH:   Past Medical History:    Allergic rhinitis    Arrhythmia    Essential hypertension       PAST SURGICAL HX:  Past Surgical History:   Procedure Laterality Date    Ep cardioversion 1x  06/23/2023         Hysterectomy               FAMILY HX:  Family History   Problem Relation Age of Onset    Hypertension Mother     Stroke Paternal Grandmother        ALLERGIES:  Patient has no known allergies.    MEDICATIONS:   Current Outpatient Medications   Medication Sig Dispense Refill    aspirin 81 MG Oral Tab EC Take 1 tablet (81 mg total) by mouth daily.      Mometasone Furoate 0.1 % External Solution Apply to scalp twice daily 60 mL 1    Irbesartan-hydroCHLOROthiazide 150-12.5 MG Oral Tab Take 1 tablet by mouth daily. 90 tablet 0    budeson-glycopyrrol-formoterol (BREZTRI AEROSPHERE) 160-9-4.8 MCG/ACT Inhalation Aerosol Inhale 2 puffs into the lungs 2 (two) times  daily. 10.7 g 5    HYDROCHLOROTHIAZIDE 12.5 MG Oral Tab TAKE 1 TABLET(12.5 MG) BY MOUTH DAILY 90 tablet 1    ipratropium-albuterol 0.5-2.5 (3) MG/3ML Inhalation Solution Take 3 mL by nebulization 4 (four) times daily. 360 mL 3    ipratropium 0.02 % Inhalation Solution Take 2.5 mL (500 mcg total) by nebulization 4 (four) times daily. 300 mL 5    Fexofenadine HCl (ALLEGRA OR) Take 1 tablet by mouth daily as needed.      Pseudoephedrine HCl (PSEUDO OR) Take 1 tablet by mouth daily as needed.      Cholecalciferol (VITAMIN D3) 75 MCG (3000 UT) Oral Tab Take 1 tablet by mouth daily.         ROS: A comprehensive 14 point review of systems was performed and was negative aside from the aforementioned per history of present illness.    SOCIAL HX:  Social History     Tobacco Use    Smoking status: Never     Passive exposure: Never    Smokeless tobacco: Never   Substance Use Topics    Alcohol use: Yes     Alcohol/week: 2.0 standard drinks of alcohol     Types: 1 Glasses of wine, 1 Standard drinks or equivalent per week     Comment: CAGE 11/16/20       PE:   Vitals:    07/01/24 1358   Weight: 180 lb   Height: 5' 6\" (1.676 m)     Estimated body mass index is 29.05 kg/m² as calculated from the following:    Height as of this encounter: 5' 6\" (1.676 m).    Weight as of this encounter: 180 lb.    Physical Exam  Constitutional:       Appearance: Normal appearance.   HENT:      Head: Normocephalic and atraumatic.   Eyes:      Extraocular Movements: Extraocular movements intact.   Neck:      Musculoskeletal: Normal range of motion and neck supple.   Cardiovascular:      Pulses: Normal pulses.   Pulmonary:      Effort: Pulmonary effort is normal. No respiratory distress.   Abdominal:      General: There is no distension.   Skin:     General: Skin is warm.      Capillary Refill: Capillary refill takes less than 2 seconds.      Findings: No bruising.   Neurological:      General: No focal deficit present.      Mental Status: Alert.    Psychiatric:         Mood and Affect: Mood normal.     Examination of the right shoulder demonstrates:     Skin is intact, warm and dry.   Cervical:  Slight ROM limitation.   Spurling's  Negative    Deformity:   none  Atrophy:   none    Scapular winging: Negative    Palpation:     AC Joint   Negative  Biceps Tendon  Positive  Greater Tuberosity Negative    ROM:   Forward Flexion:  90°  Abduction:   60°  External Rotation:  30°  Internal Rotation:  sacrum    Rotator Cuff Strength:   Supraspinatus:   4/5  Subscapularis:   5/5  Infraspinatus/Teres: 4/5    Provocative Tests:   Cunningham:   Positive  Speed's:   Positive  Color[place]'s:   Positive  Lift-off:    Negative  Apprehension:  Negative  Sulcus Sign:   Negative    Neurovascular Upper Extremity (Bilateral)  Motor:    5/5 EPL, Finger Abduction, , Pinch, Deltoid  Sensation:   intact to light touch median, ulnar, radial and axillary nerve  Circulation:   Normal, 2+ radial pulse    The contralateral upper extremity is without limitation in range of motion or strength, no positive provocative maneuvers.     Radiographic Examination/Diagnostics:    MRI of the shoulder personally viewed, independently interpreted and radiology report was reviewed.    MRI SHOULDER, RIGHT (CPT=73221)    Result Date: 6/10/2024  PROCEDURE:  MRI SHOULDER, RIGHT (CPT=73221)  COMPARISON:  [facility], XR, XR SHOULDER, COMPLETE (MIN 2 VIEWS), RIGHT (CPT=73030), 5/30/2024, 11:40 AM.  INDICATIONS:  73-year-old female presents with a suspected exercised induced injury to the right shoulder, presenting with pain and limited range of motion for 2 months.  TECHNIQUE:  Multiplanar imaging of the shoulder including oblique coronal, axial and sagittal imaging was acquired including proton density fat suppression technique. Images were performed without intravenous gadolinium.  PATIENT STATED HISTORY: (As transcribed by Technologist)  Right shoulder pain with limited range of motion. x 2 months.  Possible  injury exercising, pain started next day.    FINDINGS:  ROTATOR CUFF:  There is a severe rotator cuff tendinopathy of the supraspinatus tendon with a full-thickness tear that approximates 1.0 x 2.2 cm in transverse and AP dimensions respectively.  The remainder of the rotator cuff is intact. MUSCULATURE:  No acute muscular strains or edema noted.  There is selective moderate atrophy and fatty replacement of the teres minor muscle.  The adjacent quadrilateral space appears within normal limits without evidence of space-occupying lesions or inflammatory changes. AC JOINT/ACROMION:  Moderate AC joint arthropathy.  The acromion is type 2 with subacromial enthesopathy, consistent with extrinsic rotator cuff impingement.  There is mild to moderate subacromial/subdeltoid bursitis. BICEPS/LABRAL COMPLEX:  The long head of the biceps tendon is intact and unremarkable.  The biceps labral anchor and glenoid labrum reveal mild degenerative changes extending from the anchor into the anterior superior and posterior superior quadrants.  Glenohumeral ligaments are unremarkable. GLENOHUMERAL JOINT:  No significant arthritis or acute injury.  Normal marrow and cortical signal.  Unremarkable capsular insertions.  No effusion.             CONCLUSION:    1. Severe rotator cuff tendinopathy of the supraspinatus tendon with full-thickness supraspinatus tear measuring 1.0 x 2.2 cm.   2. Severe selective atrophy of the teres minor muscle with fatty infiltration/fatty replacement.  This can be seen in patients with quadrilateral space syndrome.  However as seen on this exam, no space-occupying lesions of the quadrilateral space or inflammatory changes of the quadrilateral space are identified.   3. Moderate AC joint arthropathy with type 2 acromion and subacromial enthesopathy consistent with extrinsic rotator cuff impingement.  Associated mild to moderate subacromial/subdeltoid bursitis.      LOCATION:  [facility]   Dictated by (CST): Armando  DO Leslie on 6/10/2024 at 8:46 AM     Finalized by (CST): Leslie Roberts DO on 6/10/2024 at 9:02 AM         IMPRESSION: Tootie Matamoros is a 73 year old female with Right shoulder full thickness supraspinatus tear, subacromial impingement, AC joint arthropathy and biceps tendinitis sustained April 2024.    The patient has failed an appropriate course of nonsurgical conservative management and is a candidate for arthroscopic rotator cuff repair, subacromial decompression, biceps tenodesis, and distal clavicle excision.    PLAN:   We had a detailed discussion outlining the etiology, anatomy, pathophysiology, and natural history of rotator cuff pathology of the shoulder. Imaging was reviewed in detail and correlated to a 3-dimensional model of the shoulder.     I had a lengthy discussion with Totoie about the diagnosis and options, both surgical and nonsurgical. I have recommended that we proceed with arthroscopic rotator cuff repair and likely biceps tenodesis as we agree surgical intervention would likely offer the best opportunity for symptomatic relief and functional recovery. I used imaging studies and a 3-dimensional model to outline her pathology, as well as general surgical principles. We reviewed the risks associated with shoulder arthroscopy.   In particular we discussed risks that include, but are not limited to infection, blood loss, potential transient or permanent injury to nerves or blood vessels, joint stiffness, persistent pain, need for future operation, failure of healing, wound complications, failure of therapeutic intervention, risk of re-injury, fixation failure, deep vein thrombosis and pulmonary embolism. We discussed the proposed rehabilitation timeline as well as expected postoperative restrictions.     Most post-surgical patients after rotator cuff repair utilize a shoulder immobilizer/sling for approximately ~4 weeks.  Physical therapy is initiated immediately postsurgically with initial  passive range of motion, followed by active assisted range of motion, and ultimately active range of motion after the 6 to 8-week bethel.  After the 3-month bethel postsurgically the restrictions are lifted and continued strengthening is recommended.    Tootie voiced a good understanding of treatment options, risks and benefits, postoperative instructions, rehabilitation timeline, and restrictions. She was given the opportunity to ask questions, which were all answered to the best of my ability and to her satisfaction. Tootie will work with my office to arrange a time for surgery and obtain any medical clearance information necessary. My pre-operative information packet, which details the process and answers many FAQ's will be provided. She was encouraged to call the office with any further questions or concerns.    I spent 40 minutes in preparation to see the patient, counseling/education of relevant pathology, discussing imaging results, surgical counseling, DME fitting, and care coordination.      FOLLOW-UP:   Post-Operative Visit, POD 6 with GRACIELA Cueto MD  Knee, Shoulder, & Elbow Surgery / Sports Medicine Specialist  Orthopaedic Surgery  [address] [place] [email or web address]  [email or web address]  t: [phone]  o: [phone]  f: [phone]    This note was dictated using Dragon software.  While it was briefly proofread prior to completion, some grammatical, spelling, and word choice errors due to dictation may still occur.

## 2024-07-10 ENCOUNTER — TELEPHONE (OUTPATIENT)
Dept: ORTHOPEDICS CLINIC | Facility: CLINIC | Age: 74
End: 2024-07-10

## 2024-07-17 ENCOUNTER — OFFICE VISIT (OUTPATIENT)
Dept: PHYSICAL MEDICINE AND REHAB | Facility: CLINIC | Age: 74
End: 2024-07-17
Payer: MEDICARE

## 2024-07-17 VITALS — WEIGHT: 180 LBS | HEIGHT: 66 IN | RESPIRATION RATE: 18 BRPM | BODY MASS INDEX: 28.93 KG/M2

## 2024-07-17 DIAGNOSIS — R20.2 PARESTHESIA OF THUMB OF RIGHT HAND: Primary | ICD-10-CM

## 2024-07-17 PROCEDURE — 99214 OFFICE O/P EST MOD 30 MIN: CPT | Performed by: PHYSICAL MEDICINE & REHABILITATION

## 2024-07-17 NOTE — PROGRESS NOTES
RETURN PATIENT VISIT    CHIEF COMPLAINT  Right shoulder pain     INTERVAL HISTORY  Tootie Matamoros is a 73 year old who was last seen in clinic on 6/26/2024, at that visit she underwent right-sided subacromial bursa injection.  She states that the first few days she had some soreness but after that she had significant relief.  Near complete resolution of her pain.  Intermittent numbness and tingling in the third and fourth digits of the right hand but overall current level of pain is 1 out of 10.  She continues to physical therapy and home exercise program.      She states that she notes some pain after PT but overall doing better and working on reaching out and using her right arm with morre consistency. She states that the pain in her right shoulder and arm is mostly gone.     She notes some ongoing tingling in digits 3-4 on the right side. She has some difficulty with adduction of her fingers on the right side.     She notes long standing history of falls, intermittently. She notes having to steady herself while walking.     REVIEW OF SYSTEMS  Review of systems was completed with the patient today as pertinent to today's visit    PHYSICAL EXAMINATION  CONSTITUTIONAL: Well-appearing, in no apparent distress  EYES: No scleral icterus or conjunctival hemorrhage  CARDIOVASCULAR: Skin warm and well-perfused, no peripheral edema  RESPIRATORY: Breathing unlabored without accessory muscle use  PSYCHIATRIC: Alert, cooperative, appropriate mood and affect  SKIN: No lesions or rashes on exposed skin  MUSCULOSKELETAL: Slight difficulty with forming  fist on the right side compared to the left.  Individual muscle testing of her finger flexors appears intact.  Sensation gross intact light touch generally in the upper extremity however in the second and third digits of the right hand she does have some diminished sensation.  NEUROLOGIC: Sensation and strength as above.    REVIEW OF PRIOR X-RAYS/STUDIES  MRI right shoulder  dated 6/10/2024 reveals rotator cuff tendinopathy.    IMPRESSION/DIAGNOSIS  1.    Encounter Diagnosis   Name Primary?    Paresthesia of thumb of right hand Yes         TREATMENT/PLAN  Overall patient doing very well after subacromial bursa injection.  Will check an EMG of the right upper extremity evaluating the numbness and tingling she feels in her fingers.    Will follow-up with me for the EMG.    Education was provided regarding the above impression/diagnosis and treatment options/plan were discussed.  All questions were answered during today's visit.  Patient will contact clinic if any other questions or concerns.          Alfredito Camarillo,   Interventional Spine and Sports Medicine Specialist   Physical Medicine and Rehabilitation  [facility]  [address] [place] [id number]    [address] [place] [address]

## 2024-07-31 ENCOUNTER — NURSE TRIAGE (OUTPATIENT)
Dept: INTERNAL MEDICINE CLINIC | Facility: CLINIC | Age: 74
End: 2024-07-31

## 2024-07-31 ENCOUNTER — OFFICE VISIT (OUTPATIENT)
Dept: FAMILY MEDICINE CLINIC | Facility: CLINIC | Age: 74
End: 2024-07-31
Payer: MEDICARE

## 2024-07-31 VITALS
SYSTOLIC BLOOD PRESSURE: 120 MMHG | RESPIRATION RATE: 16 BRPM | OXYGEN SATURATION: 98 % | WEIGHT: 178 LBS | HEART RATE: 80 BPM | BODY MASS INDEX: 29 KG/M2 | TEMPERATURE: 98 F | DIASTOLIC BLOOD PRESSURE: 68 MMHG

## 2024-07-31 DIAGNOSIS — N30.01 ACUTE CYSTITIS WITH HEMATURIA: Primary | ICD-10-CM

## 2024-07-31 LAB
APPEARANCE: CLEAR
BILIRUBIN: NEGATIVE
GLUCOSE (URINE DIPSTICK): NEGATIVE MG/DL
KETONES (URINE DIPSTICK): NEGATIVE MG/DL
MULTISTIX LOT#: ABNORMAL NUMERIC
NITRITE, URINE: NEGATIVE
PH, URINE: 7 (ref 4.5–8)
PROTEIN (URINE DIPSTICK): NEGATIVE MG/DL
SPECIFIC GRAVITY: 1.01 (ref 1–1.03)
URINE-COLOR: YELLOW
UROBILINOGEN,SEMI-QN: 0.2 MG/DL (ref 0–1.9)

## 2024-07-31 PROCEDURE — 87088 URINE BACTERIA CULTURE: CPT | Performed by: PHYSICIAN ASSISTANT

## 2024-07-31 PROCEDURE — 87186 SC STD MICRODIL/AGAR DIL: CPT | Performed by: PHYSICIAN ASSISTANT

## 2024-07-31 PROCEDURE — 87086 URINE CULTURE/COLONY COUNT: CPT | Performed by: PHYSICIAN ASSISTANT

## 2024-07-31 PROCEDURE — 81003 URINALYSIS AUTO W/O SCOPE: CPT | Performed by: PHYSICIAN ASSISTANT

## 2024-07-31 PROCEDURE — 99213 OFFICE O/P EST LOW 20 MIN: CPT | Performed by: PHYSICIAN ASSISTANT

## 2024-07-31 RX ORDER — CEPHALEXIN 500 MG/1
500 CAPSULE ORAL 2 TIMES DAILY
Qty: 14 CAPSULE | Refills: 0 | Status: SHIPPED | OUTPATIENT
Start: 2024-07-31 | End: 2024-08-07

## 2024-07-31 NOTE — TELEPHONE ENCOUNTER
Action Requested: Summary for Provider     []  Critical Lab, Recommendations Needed  [] Need Additional Advice  []   FYI    []   Need Orders  [] Need Medications Sent to Pharmacy  []  Other     SUMMARY: Patient called to report increased burning, frequency and urgency upon urination that started 2 days ago.  She denies any blood in urine, foul odor, fever or back pain. Some left side pain that may not be related to current symptoms.  She states she has never had a UTI before. Advised patient to be seen today for symptoms.  Walk in facility locations reviewed with patient.  Phone number provided to schedule appointment.      Reason for call: UTI  Onset: two days ago      Reason for Disposition   Age > 50 years    Protocols used: Urination Pain - Female-A-OH

## 2024-07-31 NOTE — PROGRESS NOTES
CHIEF COMPLAINT:     Chief Complaint   Patient presents with    Urinary Symptoms     Entered by patient  x last couple of days have had frequency and urgency without out put burning with urination        HPI:   Tootie Matamoros is a 73 year old female who presents with symptoms of UTI. Complaining of urinary frequency, urgency, dysuria for last 2 days. No hx of UTIs. Denies flank pain, back pain, abdominal pain, fever, hematuria, nausea, or vomiting.  Denies unusual vaginal discharge or itching, new sexual partners in the last 3 months, or recent unprotected sexual intercourse.     Current Outpatient Medications   Medication Sig Dispense Refill    cephalexin 500 MG Oral Cap Take 1 capsule (500 mg total) by mouth 2 (two) times daily for 7 days. 14 capsule 0    aspirin 81 MG Oral Tab EC Take 1 tablet (81 mg total) by mouth daily.      Mometasone Furoate 0.1 % External Solution Apply to scalp twice daily 60 mL 1    Irbesartan-hydroCHLOROthiazide 150-12.5 MG Oral Tab Take 1 tablet by mouth daily. 90 tablet 0    HYDROCHLOROTHIAZIDE 12.5 MG Oral Tab TAKE 1 TABLET(12.5 MG) BY MOUTH DAILY 90 tablet 1    Cholecalciferol (VITAMIN D3) 75 MCG (3000 UT) Oral Tab Take 1 tablet by mouth daily.      budeson-glycopyrrol-formoterol (BREZTRI AEROSPHERE) 160-9-4.8 MCG/ACT Inhalation Aerosol Inhale 2 puffs into the lungs 2 (two) times daily. (Patient not taking: Reported on 7/31/2024) 10.7 g 5    ipratropium-albuterol 0.5-2.5 (3) MG/3ML Inhalation Solution Take 3 mL by nebulization 4 (four) times daily. (Patient not taking: Reported on 7/31/2024) 360 mL 3    ipratropium 0.02 % Inhalation Solution Take 2.5 mL (500 mcg total) by nebulization 4 (four) times daily. (Patient not taking: Reported on 7/31/2024) 300 mL 5    Fexofenadine HCl (ALLEGRA OR) Take 1 tablet by mouth daily as needed. (Patient not taking: Reported on 7/31/2024)      Pseudoephedrine HCl (PSEUDO OR) Take 1 tablet by mouth daily as needed. (Patient not taking: Reported  on 7/31/2024)        Past Medical History:    Allergic rhinitis    Arrhythmia    Essential hypertension      Social History:  Social History     Socioeconomic History    Marital status:    Tobacco Use    Smoking status: Never     Passive exposure: Never    Smokeless tobacco: Never   Vaping Use    Vaping status: Never Used   Substance and Sexual Activity    Alcohol use: Yes     Alcohol/week: 2.0 standard drinks of alcohol     Types: 1 Glasses of wine, 1 Standard drinks or equivalent per week     Comment: CAGE 11/16/20    Drug use: Never   Other Topics Concern    Caffeine Concern Yes    Exercise Yes     Social Determinants of Health     Physical Activity: High Risk (11/27/2023)    Received from [facility], FUJIAN HAIYUAN, [facility]    Exercise Vital Sign     On average, how many days per week do you engage in moderate to strenuous exercise (like a brisk walk)?: 0 days     On average, how many minutes do you engage in exercise at this level?: 0 min         REVIEW OF SYSTEMS:   GENERAL: Denies fever, chills, or body aches  SKIN: no rashes, no skin wounds or ulcers.  CARDIOVASCULAR: denies chest pain or palpitations  LUNGS: denies shortness of breath, cough, or wheezing  GI: See HPI. No N/V/C/D.   : See HPI.  Musculo: No back pain     EXAM:   /68   Pulse 80   Temp 98.4 °F (36.9 °C) (Oral)   Resp 16   Wt 178 lb (80.7 kg)   SpO2 98%   BMI 28.73 kg/m²   Physical Exam  Constitutional:       General: She is not in acute distress.     Appearance: Normal appearance.   HENT:      Head: Normocephalic and atraumatic.   Cardiovascular:      Rate and Rhythm: Normal rate and regular rhythm.      Heart sounds: Normal heart sounds. No murmur heard.  Pulmonary:      Effort: Pulmonary effort is normal.      Breath sounds: Normal breath sounds. No wheezing or rhonchi.   Abdominal:      General: Abdomen is flat. Bowel sounds are normal. There is no distension.      Palpations: Abdomen  is soft. There is no mass.      Tenderness: There is no abdominal tenderness. There is no right CVA tenderness, left CVA tenderness or guarding.   Neurological:      Mental Status: She is alert.           Recent Results (from the past 24 hour(s))   URINALYSIS, AUTO, W/O SCOPE    Collection Time: 07/31/24  4:19 PM   Result Value Ref Range    Glucose Urine Negative Negative mg/dL    Bilirubin Urine Negative Negative    Ketones, UA Negative Negative - Trace mg/dL    Spec Gravity 1.015 1.005 - 1.030    Blood Urine Small (A) Negative    PH Urine 7.0 5.0 - 8.0    Protein Urine Negative Negative - Trace mg/dL    Urobilinogen Urine 0.2 0.2 - 1.0 mg/dL    Nitrite Urine Negative Negative    Leukocyte Esterase Urine Large (A) Negative    APPEARANCE clear Clear    Color Urine yellow Yellow    Multistix Lot# [id number] Numeric    Multistix Expiration Date 5/31/25 Date         ASSESSMENT AND PLAN:   Tootie Matamoros is a 73 year old female presents with UTI symptoms.    ASSESSMENT:  Encounter Diagnosis   Name Primary?    Acute cystitis with hematuria Yes       PLAN: Meds as listed below.  Comfort measures as described in Patient Instructions. Will send urine culture.     Meds & Refills for this Visit:  Requested Prescriptions     Signed Prescriptions Disp Refills    cephalexin 500 MG Oral Cap 14 capsule 0     Sig: Take 1 capsule (500 mg total) by mouth 2 (two) times daily for 7 days.       Risk and benefits of medication discussed.   Stressed importance of completing full course of antibiotic unless told otherwise.     The patient indicates understanding of these issues and agrees to the plan.  The patient is asked to see PCP in 3 days if not better. Seek care immediately for new onset of fever, vomiting, worsening symptoms.    Patient Instructions   Push fluids   Will call or MyChart with culture results   Please follow up with PCP if no improvement or if symptoms worsen

## 2024-07-31 NOTE — PATIENT INSTRUCTIONS
Push fluids   Will call or MyChart with culture results   Please follow up with PCP if no improvement or if symptoms worsen

## 2024-08-01 ENCOUNTER — PROCEDURE VISIT (OUTPATIENT)
Dept: PHYSICAL MEDICINE AND REHAB | Facility: CLINIC | Age: 74
End: 2024-08-01
Payer: MEDICARE

## 2024-08-01 DIAGNOSIS — M54.12 CERVICAL RADICULOPATHY AT C8: Primary | ICD-10-CM

## 2024-08-01 PROCEDURE — 95909 NRV CNDJ TST 5-6 STUDIES: CPT | Performed by: PHYSICAL MEDICINE & REHABILITATION

## 2024-08-01 PROCEDURE — 95886 MUSC TEST DONE W/N TEST COMP: CPT | Performed by: PHYSICAL MEDICINE & REHABILITATION

## 2024-08-01 NOTE — PROCEDURES
[facility]  [address] [place] [id number]        Full Name: JOSE GASTELUM Gender: Female  Patient ID: [id number] YOB: 1950      Visit Date: 8/1/2024 1:50 PM  Age: 73 Years  Examining Physician: ALBERT TY DO  Referring Physician: ALBERT TY DO  Height: 5 feet 6 inch  Weight: 178 lbs  History: EMG RUE. PAIN 2/10. T/N IN R 3RD AND 4TH FINGERS. NO DM, NO THYROID ISSUES, DENIES DAILY ALCOHOL USE.       Sensory NCS      Nerve / Sites Rec. Site Onset Lat Peak Lat NP Amp PP Amp Segments Distance Velocity Comment     ms ms µV µV  cm m/s    R Median - Dig II (Antidromic)      Wrist Index 2.81 3.49 31.3 57.1 Wrist - Index 14 50    R Ulnar - Dig V (Antidromic)      Wrist Dig V 2.81 3.54 31.6 48.7 Wrist - Dig V 14 50    R Radial - Superficial (Antidromic)      Forearm Wrist 1.77 2.50 28.5 31.6 Forearm - Wrist 10 56        Motor NCS      Nerve / Sites Muscle Latency Amplitude Segments Dist. Lat Diff Velocity Comments     ms mV  cm ms m/s    R Median - APB      Wrist APB 3.13 9.2 Wrist - APB 8         Elbow APB 7.44 9.3 Elbow - Wrist 22 4.31 51.0    R Ulnar - ADM      Wrist ADM 2.88 8.3 Wrist - ADM 8         B.Elbow ADM 6.83 8.0 B.Elbow - Wrist 23 3.96 58.1       A.Elbow ADM 8.88 7.4 A.Elbow - B.Elbow 10 2.04 49.0        EMG Summary Table     Spontaneous MUAP Recruitment   Muscle IA Fib PSW Fasc H.F. Amp Dur. PPP Pattern   R. Deltoid N None None None None N N N N   R. Triceps brachii N None None None None N N N N   R. Biceps brachii N None None None None N N N N   R. Pronator teres N None None None None N N N N   R. First dorsal interosseous 1+ None 1+ None None N N N N   R. Abductor pollicis brevis 1+ 1+ None None None N N N N       Summary    The motor conduction test was normal in all 2 of the tested nerves: R Median - APB, R Ulnar - ADM.    The sensory conduction test was normal in all 3 of the tested nerves: R Median - Dig II (Antidromic), R Ulnar - Dig V (Antidromic),  R Radial - Superficial (Antidromic).    The needle EMG examination was performed in 6 muscles. It was normal in 4 muscle(s): R. Deltoid, R. Triceps brachii, R. Biceps brachii, R. Pronator teres. The study was abnormal in 2 muscle(s), with the following distribution:  Abnormal spontaneous/insertional activity was found in R. First dorsal interosseous, R. Abductor pollicis brevis.        JOSE GASTELUM [id number] 8/1/2024 1:50 PM     3 of 3    Conclusion:     This is an abnormal study.    1.  There is electrodiagnostic evidence to support an acute right-sided C8 radiculopathy with active denervation changes noted on needle EMG.  2.  There is no evidence to support a right-sided median or ulnar mononeuropathy.        Alfredito Camarillo DO  Interventional Spine and Sports Medicine Specialist   Physical Medicine and Rehabilitation  [address] [address]    [address] [place] [address]        ________________________  DO JOSE BAI [id number] 8/1/2024 1:50 PM     3 of 3

## 2024-08-02 ENCOUNTER — HOSPITAL ENCOUNTER (OUTPATIENT)
Dept: MRI IMAGING | Age: 74
Discharge: HOME | End: 2024-08-02
Attending: PHYSICAL MEDICINE & REHABILITATION
Payer: MEDICARE

## 2024-08-02 DIAGNOSIS — M54.12 CERVICAL RADICULOPATHY AT C8: ICD-10-CM

## 2024-08-02 PROCEDURE — 72141 MRI NECK SPINE W/O DYE: CPT | Performed by: PHYSICAL MEDICINE & REHABILITATION

## 2024-08-06 ENCOUNTER — MED REC SCAN ONLY (OUTPATIENT)
Dept: PHYSICAL MEDICINE AND REHAB | Facility: CLINIC | Age: 74
End: 2024-08-06

## 2024-08-08 ENCOUNTER — TELEPHONE (OUTPATIENT)
Dept: INTERNAL MEDICINE CLINIC | Facility: CLINIC | Age: 74
End: 2024-08-08

## 2024-08-08 DIAGNOSIS — N30.00 ACUTE CYSTITIS WITHOUT HEMATURIA: Primary | ICD-10-CM

## 2024-08-08 NOTE — TELEPHONE ENCOUNTER
Received call from pt. Patient spoke to triage last week with urinary sxs, she was directed to [facility]. Patient was seen in [facility] on 7/31/24 and was started on cephalexin for UTI. Patient has now completed cephalexin and sxs have not improved, still with dysuria and frequency. Denies fever. Patient unsure if she needs to go back to [facility] or if CS can help.     Routing to  for review/recs? No appts available this week.

## 2024-08-09 ENCOUNTER — LAB ENCOUNTER (OUTPATIENT)
Dept: LAB | Age: 74
End: 2024-08-09
Attending: PHYSICIAN ASSISTANT
Payer: MEDICARE

## 2024-08-09 DIAGNOSIS — N30.00 ACUTE CYSTITIS WITHOUT HEMATURIA: ICD-10-CM

## 2024-08-09 LAB
BILIRUB UR QL STRIP.AUTO: NEGATIVE
CLARITY UR REFRACT.AUTO: CLEAR
GLUCOSE UR STRIP.AUTO-MCNC: NORMAL MG/DL
KETONES UR STRIP.AUTO-MCNC: NEGATIVE MG/DL
LEUKOCYTE ESTERASE UR QL STRIP.AUTO: NEGATIVE
NITRITE UR QL STRIP.AUTO: NEGATIVE
PH UR STRIP.AUTO: 5.5 [PH] (ref 5–8)
PROT UR STRIP.AUTO-MCNC: NEGATIVE MG/DL
RBC UR QL AUTO: NEGATIVE
SP GR UR STRIP.AUTO: 1.01 (ref 1–1.03)
UROBILINOGEN UR STRIP.AUTO-MCNC: NORMAL MG/DL

## 2024-08-09 PROCEDURE — 81003 URINALYSIS AUTO W/O SCOPE: CPT

## 2024-08-09 NOTE — TELEPHONE ENCOUNTER
Called and spoke to pt. Advised to rpt UA, can complete today at lab. Patient stated understanding and agreed to plan.

## 2024-08-14 ENCOUNTER — OFFICE VISIT (OUTPATIENT)
Dept: FAMILY MEDICINE CLINIC | Facility: CLINIC | Age: 74
End: 2024-08-14
Payer: MEDICARE

## 2024-08-14 VITALS
WEIGHT: 180 LBS | TEMPERATURE: 98 F | DIASTOLIC BLOOD PRESSURE: 78 MMHG | BODY MASS INDEX: 29 KG/M2 | RESPIRATION RATE: 16 BRPM | SYSTOLIC BLOOD PRESSURE: 130 MMHG | OXYGEN SATURATION: 98 % | HEART RATE: 66 BPM

## 2024-08-14 DIAGNOSIS — R35.0 URINARY FREQUENCY: ICD-10-CM

## 2024-08-14 DIAGNOSIS — R30.0 BURNING WITH URINATION: Primary | ICD-10-CM

## 2024-08-14 LAB
APPEARANCE: CLEAR
BILIRUBIN: NEGATIVE
GLUCOSE (URINE DIPSTICK): NEGATIVE MG/DL
KETONES (URINE DIPSTICK): NEGATIVE MG/DL
LEUKOCYTES: NEGATIVE
MULTISTIX LOT#: NORMAL NUMERIC
NITRITE, URINE: NEGATIVE
OCCULT BLOOD: NEGATIVE
PH, URINE: 5.5 (ref 4.5–8)
PROTEIN (URINE DIPSTICK): NEGATIVE MG/DL
SPECIFIC GRAVITY: 1.01 (ref 1–1.03)
URINE-COLOR: YELLOW
UROBILINOGEN,SEMI-QN: 0.2 MG/DL (ref 0–1.9)

## 2024-08-14 PROCEDURE — 99213 OFFICE O/P EST LOW 20 MIN: CPT | Performed by: NURSE PRACTITIONER

## 2024-08-14 PROCEDURE — 81003 URINALYSIS AUTO W/O SCOPE: CPT | Performed by: NURSE PRACTITIONER

## 2024-08-14 RX ORDER — FLUCONAZOLE 150 MG/1
TABLET ORAL
Qty: 3 TABLET | Refills: 0 | Status: SHIPPED | OUTPATIENT
Start: 2024-08-14

## 2024-08-14 RX ORDER — PHENAZOPYRIDINE HYDROCHLORIDE 200 MG/1
200 TABLET, FILM COATED ORAL 3 TIMES DAILY PRN
Qty: 30 TABLET | Refills: 0 | Status: SHIPPED | OUTPATIENT
Start: 2024-08-14 | End: 2024-08-24

## 2024-08-14 NOTE — PROGRESS NOTES
Tootie Matamoros is a 73 year old female.  HPI:   Patient presents for recheck of urinary symptoms. See visit to [facility] on 7/31/24. Treated with Cephalexin with mild improvement. For the last three weeks notes increased burning with urination as well as frequency, sometimes urinating three times an hour. Waking from sleep. Notes sometimes strong stream and sometimes feels as if not fully empty. Denies fever, new back pain, vomiting or nausea, constipation or diarrhea, denies blood in urine or vaginal discharge. No recent sexual activity with spouse.   Current Outpatient Medications   Medication Sig Dispense Refill    aspirin 81 MG Oral Tab EC Take 1 tablet (81 mg total) by mouth daily.      Mometasone Furoate 0.1 % External Solution Apply to scalp twice daily 60 mL 1    Irbesartan-hydroCHLOROthiazide 150-12.5 MG Oral Tab Take 1 tablet by mouth daily. 90 tablet 0    HYDROCHLOROTHIAZIDE 12.5 MG Oral Tab TAKE 1 TABLET(12.5 MG) BY MOUTH DAILY 90 tablet 1    Cholecalciferol (VITAMIN D3) 75 MCG (3000 UT) Oral Tab Take 1 tablet by mouth daily.      budeson-glycopyrrol-formoterol (BREZTRI AEROSPHERE) 160-9-4.8 MCG/ACT Inhalation Aerosol Inhale 2 puffs into the lungs 2 (two) times daily. (Patient not taking: Reported on 7/31/2024) 10.7 g 5    ipratropium-albuterol 0.5-2.5 (3) MG/3ML Inhalation Solution Take 3 mL by nebulization 4 (four) times daily. (Patient not taking: Reported on 7/31/2024) 360 mL 3    ipratropium 0.02 % Inhalation Solution Take 2.5 mL (500 mcg total) by nebulization 4 (four) times daily. (Patient not taking: Reported on 7/31/2024) 300 mL 5    Fexofenadine HCl (ALLEGRA OR) Take 1 tablet by mouth daily as needed. (Patient not taking: Reported on 7/31/2024)      Pseudoephedrine HCl (PSEUDO OR) Take 1 tablet by mouth daily as needed. (Patient not taking: Reported on 7/31/2024)        Past Medical History:    Allergic rhinitis    Arrhythmia    Essential hypertension      Social History:  Social History      Socioeconomic History    Marital status:    Tobacco Use    Smoking status: Never     Passive exposure: Never    Smokeless tobacco: Never   Vaping Use    Vaping status: Never Used   Substance and Sexual Activity    Alcohol use: Yes     Alcohol/week: 2.0 standard drinks of alcohol     Types: 1 Glasses of wine, 1 Standard drinks or equivalent per week     Comment: CAGE 11/16/20    Drug use: Never   Other Topics Concern    Caffeine Concern Yes    Exercise Yes     Social Determinants of Health     Physical Activity: High Risk (11/27/2023)    [facility]    Exercise Vital Sign     On average, how many days per week do you engage in moderate to strenuous exercise (like a brisk walk)?: 0 days     On average, how many minutes do you engage in exercise at this level?: 0 min         REVIEW OF SYSTEMS:   GENERAL HEALTH: feels well otherwise  SKIN: denies any unusual skin lesions or rashes  RESPIRATORY: denies shortness of breath with exertion  CARDIOVASCULAR: denies chest pain on exertion  GI: denies nausea or vomiting  : denies hematuria  NEURO: denies headaches  MUSCULOSKELETAL:  denies CVA tenderness, denies new back pain, seeing PT for torn right rotator cuff    EXAM:   /78   Pulse 66   Temp 97.8 °F (36.6 °C) (Oral)   Resp 16   Wt 180 lb (81.6 kg)   SpO2 98%   BMI 29.05 kg/m²   GENERAL: well developed, well nourished,in no apparent distress  CARDIOVASCULAR:  Normal rate, normal S1, S2  SKIN: no rashes,no suspicious lesions  MUSCULOSKELETAL:  No CVA tenderness  GI/:  abd soft, bladder soft and nondistended. no suprapubic tenderness  PSYCHIATRIC:  Alert and oriented x 3.  Results for orders placed or performed in visit on 08/14/24   URINALYSIS, AUTO, W/O SCOPE    Collection Time: 08/14/24 10:19 AM   Result Value Ref Range    Glucose Urine Negative Negative mg/dL    Bilirubin Urine Negative Negative    Ketones, UA Negative  Negative - Trace mg/dL    Spec Gravity 1.010 1.005 - 1.030    Blood Urine Negative Negative    PH Urine 5.5 5.0 - 8.0    Protein Urine Negative Negative - Trace mg/dL    Urobilinogen Urine 0.2 0.2 - 1.0 mg/dL    Nitrite Urine Negative Negative    Leukocyte Esterase Urine Negative Negative    APPEARANCE clear Clear    Color Urine yellow Yellow    Multistix Lot# [id number] Numeric    Multistix Expiration Date 5/31/25 Date         ASSESSMENT AND PLAN:   Tootie was seen today for urinary symptoms.    Diagnoses and all orders for this visit:    Burning with urination  -     URINALYSIS, AUTO, W/O SCOPE  -     fluconazole (DIFLUCAN) 150 MG Oral Tab; Take one tablet by mouth today, on day 4 and on day 7.    Urinary frequency  -     phenazopyridine 200 MG Oral Tab; Take 1 tablet (200 mg total) by mouth 3 (three) times daily as needed for Pain.      Discussed with patient does not appear to be UTI. Patient has appointment with PCP in five days. Will treat with antifungal due to recent antibiotic use causing possible yeast infection, discussed bladder spasm medication for temporary use until visit. Medication use and risk/benefit discussed. Advised on need for further workup, noted reasons to proceed to higher level of care if treatment plan today not working or symptoms worsen prior to PCP appointment. Patient wishes to try treatment plan offered today and to follow up as discussed.    Patient Instructions   Please start Diflucan today, take again in three days and again on day 7. This will take care of yeast if present.  You may take Pyridium one tablet every 8 hours as  needed for bladder pain/spasm. NO CONTACTS during use as this medication will stain contacts/turn urine orange. Use ONLY for up to three days.  Stay well hydrated, avoid caffeine and spicy foods.  Wipe front to back when using the bathroom. Consider bidet attachment for cleaning.  Consider water based lubrication for sexual activity. Use the toilet before and  right after intercourse and wipe front to back.  Keep appointment with primary care. Go to the ER if symptoms worsen (fever, increased abdominal pain, inability to urinate despite medication)

## 2024-08-14 NOTE — PATIENT INSTRUCTIONS
Please start Diflucan today, take again in three days and again on day 7. This will take care of yeast if present.  You may take Pyridium one tablet every 8 hours as  needed for bladder pain/spasm. NO CONTACTS during use as this medication will stain contacts/turn urine orange. Use ONLY for up to three days.  Stay well hydrated, avoid caffeine and spicy foods.  Wipe front to back when using the bathroom. Consider bidet attachment for cleaning.  Consider water based lubrication for sexual activity. Use the toilet before and right after intercourse and wipe front to back.  Keep appointment with primary care. Go to the ER if symptoms worsen (fever, increased abdominal pain, inability to urinate despite medication)

## 2024-08-19 ENCOUNTER — OFFICE VISIT (OUTPATIENT)
Dept: INTERNAL MEDICINE CLINIC | Facility: CLINIC | Age: 74
End: 2024-08-19
Payer: MEDICARE

## 2024-08-19 VITALS
BODY MASS INDEX: 28.61 KG/M2 | WEIGHT: 178 LBS | OXYGEN SATURATION: 99 % | TEMPERATURE: 97 F | SYSTOLIC BLOOD PRESSURE: 120 MMHG | DIASTOLIC BLOOD PRESSURE: 68 MMHG | HEIGHT: 66 IN | HEART RATE: 70 BPM | RESPIRATION RATE: 18 BRPM

## 2024-08-19 DIAGNOSIS — R30.0 DYSURIA: Primary | ICD-10-CM

## 2024-08-19 DIAGNOSIS — N30.00 ACUTE CYSTITIS WITHOUT HEMATURIA: ICD-10-CM

## 2024-08-19 DIAGNOSIS — I10 ESSENTIAL HYPERTENSION: ICD-10-CM

## 2024-08-19 RX ORDER — IRBESARTAN AND HYDROCHLOROTHIAZIDE 150; 12.5 MG/1; MG/1
1 TABLET, FILM COATED ORAL DAILY
Qty: 90 TABLET | Refills: 0 | Status: SHIPPED | OUTPATIENT
Start: 2024-08-19

## 2024-08-19 NOTE — PROGRESS NOTES
Tootie Matamoros is a 73 year old female.   Chief Complaint   Patient presents with    Follow - Up     Follow up walk in clinic for UTI, states that she is doing much better     HPI:    Patient here today for follow up from walk in clinic 7/31/24 and again on 8/14/24. First visit visit was treated for UTI with keflex BID x 7 days then returned to clinic with burning with urination and frequency and ua was negative. Patient was given diflucan and pyridum which did help symptoms. Patient reports no symptoms today during visit. Patient states this was the first UTI. Patient denies nausea, vomiting, abdominal pain, diarrhea, vision changes, dysuria, urinary frequency, discharge.    Patient also needing refill of irbesartan-hydrochlorothiazide 150/12.5 to local pharmacy as there were no refills available.    Allergies:  No Known Allergies   Current Meds:  Current Outpatient Medications   Medication Sig Dispense Refill    fluconazole (DIFLUCAN) 150 MG Oral Tab Take one tablet by mouth today, on day 4 and on day 7. 3 tablet 0    phenazopyridine 200 MG Oral Tab Take 1 tablet (200 mg total) by mouth 3 (three) times daily as needed for Pain. 30 tablet 0    aspirin 81 MG Oral Tab EC Take 1 tablet (81 mg total) by mouth daily.      Mometasone Furoate 0.1 % External Solution Apply to scalp twice daily 60 mL 1    Irbesartan-hydroCHLOROthiazide 150-12.5 MG Oral Tab Take 1 tablet by mouth daily. 90 tablet 0    budeson-glycopyrrol-formoterol (BREZTRI AEROSPHERE) 160-9-4.8 MCG/ACT Inhalation Aerosol Inhale 2 puffs into the lungs 2 (two) times daily. (Patient not taking: Reported on 7/31/2024) 10.7 g 5    HYDROCHLOROTHIAZIDE 12.5 MG Oral Tab TAKE 1 TABLET(12.5 MG) BY MOUTH DAILY 90 tablet 1    ipratropium-albuterol 0.5-2.5 (3) MG/3ML Inhalation Solution Take 3 mL by nebulization 4 (four) times daily. (Patient not taking: Reported on 7/31/2024) 360 mL 3    ipratropium 0.02 % Inhalation Solution Take 2.5 mL (500 mcg total) by  nebulization 4 (four) times daily. (Patient not taking: Reported on 7/31/2024) 300 mL 5    Fexofenadine HCl (ALLEGRA OR) Take 1 tablet by mouth daily as needed. (Patient not taking: Reported on 7/31/2024)      Pseudoephedrine HCl (PSEUDO OR) Take 1 tablet by mouth daily as needed. (Patient not taking: Reported on 7/31/2024)      Cholecalciferol (VITAMIN D3) 75 MCG (3000 UT) Oral Tab Take 1 tablet by mouth daily.          PMH:     Past Medical History:    Allergic rhinitis    Arrhythmia    Essential hypertension       ROS:   Review of Systems   Constitutional: Negative.    HENT: Negative.     Respiratory: Negative.     Cardiovascular: Negative.    Gastrointestinal: Negative.    Genitourinary: Negative.    Neurological: Negative.       See HPI      PHYSICAL EXAM:    /68   Pulse 70   Temp 97.2 °F (36.2 °C) (Temporal)   Resp 18   Ht 5' 6\" (1.676 m)   Wt 178 lb (80.7 kg)   SpO2 99%   BMI 28.73 kg/m²   Physical Exam  Constitutional:       Appearance: Normal appearance.   HENT:      Mouth/Throat:      Mouth: Mucous membranes are moist.      Pharynx: Oropharynx is clear.   Cardiovascular:      Rate and Rhythm: Normal rate.   Pulmonary:      Effort: Pulmonary effort is normal.   Skin:     General: Skin is warm and dry.   Neurological:      Mental Status: She is alert. Mental status is at baseline.        ASSESSMENT/ PLAN:   1. Dysuria  Symptoms improved. Call if symptoms return and will send urine culture to lab. Continue to stay hydrated    2. Acute cystitis without hematuria  Resolved after completing keflex x 7 days.     3. Essential hypertension  Refill sent to pharmacy, continue current management      Health Maintenance Due   Topic Date Due    COVID-19 Vaccine (3 - 2023-24 season) 09/01/2023    Annual Physical  10/23/2024    Colorectal Cancer Screening  11/02/2024     Pt indicates understanding and agrees to the plan.     No follow-ups on file.    LYNNE Dowd

## 2024-08-27 ENCOUNTER — TELEMEDICINE (OUTPATIENT)
Dept: PHYSICAL MEDICINE AND REHAB | Facility: CLINIC | Age: 74
End: 2024-08-27
Payer: MEDICARE

## 2024-08-27 DIAGNOSIS — M12.811 ROTATOR CUFF ARTHROPATHY OF RIGHT SHOULDER: ICD-10-CM

## 2024-08-27 DIAGNOSIS — M54.12 CERVICAL RADICULOPATHY AT C8: Primary | ICD-10-CM

## 2024-08-27 DIAGNOSIS — R20.2 PARESTHESIA OF THUMB OF RIGHT HAND: ICD-10-CM

## 2024-08-27 PROCEDURE — 99214 OFFICE O/P EST MOD 30 MIN: CPT | Performed by: PHYSICAL MEDICINE & REHABILITATION

## 2024-08-27 NOTE — PROGRESS NOTES
[facility]    Telemedicine Visit - Follow Up Evaluation    Telehealth Verbal Consent   I conducted a telehealth visit with Tootie Matamoros today, 08/27/24, which was completed using two-way, real-time interactive audio and video communication.The patient was made aware of the limitations of the telehealth visit, including treatment limitations as no physical exam could be performed.  The patient was advised to call 911 or to go to the ER in case there was an emergency.  The patient was also advised of the potential privacy & security concerns related to the telehealth platform.   The patient was made aware of where to find [facility]'s notice of privacy practices, telehealth consent form and other related consent forms and documents.  which are located on the [facility] website. The patient verbally agreed to telehealth consent form, related consents and the risks discussed.    Lastly, the patient confirmed that they were in [place].   Included in this visit, time may have been spent reviewing labs, medications, radiology tests and decision making. Appropriate medical decision-making and tests are ordered as detailed in the plan of care above.  Coding/billing information is submitted for this visit based on complexity of care and/or time spent for the visit.    Chief Complaint: Neck and arm pain     HISTORY OF PRESENT ILLNESS:   The patient is a 73 year old female with neck and arm pain. She states that she has some tingling and pain in the fingers. She states that after PT she is now feeling her shoulder pain a bit more. She states that overall she is ok. She notices some weakness with  but other than that she is functionally doing ok.     She is scheduled for surgery October 3rd.  This will be a right shoulder arthroscopy with rotator cuff repair and extensive debridement with Dr. Torres.    Patient states that she is stopping physical therapy on Thursday, she plans to  continue with her home exercises.  Likely will return to physical therapy postoperatively after shoulder surgery in October.    CURRENT MEDICATIONS:     Current Outpatient Medications   Medication Sig Dispense Refill    Irbesartan-hydroCHLOROthiazide 150-12.5 MG Oral Tab Take 1 tablet by mouth daily. 90 tablet 0    fluconazole (DIFLUCAN) 150 MG Oral Tab Take one tablet by mouth today, on day 4 and on day 7. 3 tablet 0    aspirin 81 MG Oral Tab EC Take 1 tablet (81 mg total) by mouth daily.      Mometasone Furoate 0.1 % External Solution Apply to scalp twice daily 60 mL 1    budeson-glycopyrrol-formoterol (BREZTRI AEROSPHERE) 160-9-4.8 MCG/ACT Inhalation Aerosol Inhale 2 puffs into the lungs 2 (two) times daily. (Patient not taking: Reported on 7/31/2024) 10.7 g 5    HYDROCHLOROTHIAZIDE 12.5 MG Oral Tab TAKE 1 TABLET(12.5 MG) BY MOUTH DAILY 90 tablet 1    ipratropium-albuterol 0.5-2.5 (3) MG/3ML Inhalation Solution Take 3 mL by nebulization 4 (four) times daily. (Patient not taking: Reported on 7/31/2024) 360 mL 3    ipratropium 0.02 % Inhalation Solution Take 2.5 mL (500 mcg total) by nebulization 4 (four) times daily. (Patient not taking: Reported on 7/31/2024) 300 mL 5    Fexofenadine HCl (ALLEGRA OR) Take 1 tablet by mouth daily as needed. (Patient not taking: Reported on 7/31/2024)      Pseudoephedrine HCl (PSEUDO OR) Take 1 tablet by mouth daily as needed. (Patient not taking: Reported on 7/31/2024)      Cholecalciferol (VITAMIN D3) 75 MCG (3000 UT) Oral Tab Take 1 tablet by mouth daily.           ALLERGIES:   No Known Allergies      REVIEW OF SYSTEMS:   A comprehensive 10 point review of systems was completed.  Pertinent positives and negatives noted in the the HPI.      PHYSICAL EXAM:   General: No immediate distress  Head: Normocephalic/ Atraumatic  Eyes: Extra-occular movements intact  Ears/Nose/Throat:  External appearance identifies normal appearance without obvious deformity  Cardiovascular: No cyanosis,  clubbing or edema  Respiratory: Non-labored respirations  Skin: No lesions noted   Neurological: alert & oriented x 3, attentive, able to follow commands, comprehention intact, spontaneous speech intact  Psychiatric: Mood and affect appropriate    Musculoskeletal Exam:    Physical exam limited by video visit however patient is sitting comfortably on the exam call.  She has good range of motion of the bilateral shoulders as well as her neck, mild exacerbation of shoulder pain as well as neck pain with extension rotation of the cervical spine.  Patient endorses stable subjective strength.      IMAGING:   Independently reviewed the MRI of the cervical spine dated 8/2/2024 which reveals broad-based disc bulging at C6-7 which results in some left greater than right neuroforaminal narrowing in my opinion, there may be contacting of the right-sided nerves at this level which may contribute to the patient's right-sided C8 radiculopathy.    All imaging results were reviewed and discussed with patient.      ASSESSMENT:     1. Cervical radiculopathy at C8    2. Paresthesia of thumb of right hand    3. Rotator cuff arthropathy of right shoulder          PLAN:   Patient will move to home exercises after her last session of physical therapy Thursday, will move forward with rotator cuff repair with Dr. Torres.  We will reconvene postoperatively through the fall and into the winter to be open discussion about her cervical spine depending on her pain complaints at that time.  She would likely be a candidate for cervical epidural steroid injection if indicated.    Follow-up:  2 months    The patient verbalized understanding with this plan and was in agreement.  There are no barriers to learning.  All questions were answered.    Duration of the service: 16 minutes        Alfredito Camarillo DO  Physical Medicine and Rehabilitation  Interventional Spine and Sports Medicine   [facility]

## 2024-09-11 ENCOUNTER — OFFICE VISIT (OUTPATIENT)
Dept: INTERNAL MEDICINE CLINIC | Facility: CLINIC | Age: 74
End: 2024-09-11
Payer: MEDICARE

## 2024-09-11 VITALS
OXYGEN SATURATION: 96 % | RESPIRATION RATE: 18 BRPM | SYSTOLIC BLOOD PRESSURE: 120 MMHG | BODY MASS INDEX: 28.64 KG/M2 | TEMPERATURE: 98 F | HEIGHT: 66 IN | WEIGHT: 178.19 LBS | HEART RATE: 78 BPM | DIASTOLIC BLOOD PRESSURE: 72 MMHG

## 2024-09-11 DIAGNOSIS — I10 ESSENTIAL HYPERTENSION: ICD-10-CM

## 2024-09-11 DIAGNOSIS — I48.91 ATRIAL FIBRILLATION WITH RAPID VENTRICULAR RESPONSE (HCC): ICD-10-CM

## 2024-09-11 DIAGNOSIS — M25.511 ACUTE PAIN OF RIGHT SHOULDER: ICD-10-CM

## 2024-09-11 DIAGNOSIS — Z01.818 PRE-OP EXAM: Primary | ICD-10-CM

## 2024-09-11 LAB
ATRIAL RATE: 70 BPM
P AXIS: 39 DEGREES
P-R INTERVAL: 158 MS
Q-T INTERVAL: 408 MS
QRS DURATION: 74 MS
QTC CALCULATION (BEZET): 440 MS
R AXIS: 27 DEGREES
T AXIS: 49 DEGREES
VENTRICULAR RATE: 70 BPM

## 2024-09-11 PROCEDURE — 93000 ELECTROCARDIOGRAM COMPLETE: CPT | Performed by: PHYSICIAN ASSISTANT

## 2024-09-11 PROCEDURE — 99214 OFFICE O/P EST MOD 30 MIN: CPT | Performed by: PHYSICIAN ASSISTANT

## 2024-09-11 RX ORDER — HYDROCHLOROTHIAZIDE 12.5 MG/1
12.5 TABLET ORAL DAILY
Qty: 90 TABLET | Refills: 1 | Status: SHIPPED | OUTPATIENT
Start: 2024-09-11

## 2024-09-11 NOTE — PROGRESS NOTES
[facility]  PRE OP RISK ASSESSMENT    REASON FOR CONSULT: Pre-op risk assessment for Right Shoulder Arthroscopy Rotator Cuff Repair Arthroscopic Biceps Tenodesis Subacromial Decompression Distal Clavicle Excision Extensive Debridement planned for 10/3/24.    REQUESTING PHYSICIAN: Dr Madiha Torres    CHIEF COMPLAINT: No chief complaint on file.      HISTORY OF PRESENT ILLNESS:   The patient is a 73 year old  female  presents for pre procedure evaluation.    EXERCISE TOLERANCE: Denies exertional CP and SOB.     PREVIOUS SURGERY:   Patient has had previous surgery without incident.     EXCESSIVE BLEEDING:  Denies excessive bruising or epistaxis.  Denies personal or family history of bleeding disorder.    ANTICOAGULATION: asa 81 mg daily   CARDIAC HISTORY: HTN - well controlled with irbesartan-hydrochlorothiazide 150-12.5 mg daily and hydrochlorothiazide 12.5 mg daily; A fib - now s/p cardioversion and off rate control meds and anticoag; follows with cardiology annually   DIABETIC MEDICATIONS: no  PARVIN: no  Hx of VTE: no    Past Medical History:    Past Medical History:    Allergic rhinitis    Arrhythmia    Essential hypertension         Past Surgical History:    Past Surgical History:   Procedure Laterality Date    Ep cardioversion 1x  06/23/2023         Hysterectomy               Current Medications:    Current Outpatient Medications   Medication Sig Dispense Refill    hydroCHLOROthiazide 12.5 MG Oral Tab Take 1 tablet (12.5 mg total) by mouth daily. 90 tablet 1    Irbesartan-hydroCHLOROthiazide 150-12.5 MG Oral Tab Take 1 tablet by mouth daily. 90 tablet 0    fluconazole (DIFLUCAN) 150 MG Oral Tab Take one tablet by mouth today, on day 4 and on day 7. 3 tablet 0    aspirin 81 MG Oral Tab EC Take 1 tablet (81 mg total) by mouth daily.      Mometasone Furoate 0.1 % External Solution Apply to scalp twice daily 60 mL 1    budeson-glycopyrrol-formoterol (BREZTRI AEROSPHERE) 160-9-4.8 MCG/ACT Inhalation Aerosol  Inhale 2 puffs into the lungs 2 (two) times daily. 10.7 g 5    ipratropium-albuterol 0.5-2.5 (3) MG/3ML Inhalation Solution Take 3 mL by nebulization 4 (four) times daily. 360 mL 3    ipratropium 0.02 % Inhalation Solution Take 2.5 mL (500 mcg total) by nebulization 4 (four) times daily. 300 mL 5    Fexofenadine HCl (ALLEGRA OR) Take 1 tablet by mouth daily as needed.      Pseudoephedrine HCl (PSEUDO OR) Take 1 tablet by mouth daily as needed.      Cholecalciferol (VITAMIN D3) 75 MCG (3000 UT) Oral Tab Take 1 tablet by mouth daily.          Allergies:    Allergies as of 09/11/2024    (No Known Allergies)       SOCIAL HISTORY:   Social History     Socioeconomic History    Marital status:    Tobacco Use    Smoking status: Never     Passive exposure: Never    Smokeless tobacco: Never   Vaping Use    Vaping status: Never Used   Substance and Sexual Activity    Alcohol use: Yes     Alcohol/week: 1.0 standard drink of alcohol     Types: 1 Standard drinks or equivalent per week     Comment: CAGE 11/16/20    Drug use: Never   Other Topics Concern    Caffeine Concern Yes    Exercise Yes    Seat Belt Yes    Special Diet No    Stress Concern No    Weight Concern Yes     Social Determinants of Health     Physical Activity: High Risk (11/27/2023)    Received from [facility], [facility]    Exercise Vital Sign     On average, how many days per week do you engage in moderate to strenuous exercise (like a brisk walk)?: 0 days     On average, how many minutes do you engage in exercise at this level?: 0 min          FAMILY HISTORY:   Family History   Problem Relation Age of Onset    Hypertension Mother     Stroke Paternal Grandmother         REVIEW OF SYSTEMS:  GENERAL: feels well otherwise, denies fever and chills  SKIN: denies any unusual skin lesions  HEENT: denies blurred vision or double vision, denies nasal congestion  LUNGS: denies shortness of breath with exertion, denies  cough  CARDIOVASCULAR: denies chest pain on exertion  GI: denies abdominal pain, denies heartburn  : denies dysuria, urgency, frequency, hematuria  MUSCULOSKELETAL: denies back pain  NEURO: denies headaches    PHYSICAL EXAM:  /72   Pulse 78   Temp 98.2 °F (36.8 °C) (Temporal)   Resp 18   Ht 5' 6\" (1.676 m)   Wt 178 lb 3.2 oz (80.8 kg)   SpO2 96%   BMI 28.76 kg/m²    GENERAL: well developed, well nourished,in no apparent distress  SKIN: no rashes, no suspicious lesions  HEENT: atraumatic, normocephalic, ears and throat are clear  EYES: PERRL, EOMI, conjunctiva are clear  NECK: supple, no adenopathy  LUNGS: clear to auscultation  CARDIO: RRR without murmur  GI: good BS's, no masses, HSM or tenderness  MUSCULOSKELETAL: back is not tender  EXTREMITIES: no edema  NEURO: A&Ox3, no focal deficit       ASSESSMENT AND PLAN:    Encounter Diagnoses   Name Primary?    Pre-op exam Yes    Acute pain of right shoulder     Essential hypertension     Atrial fibrillation with rapid ventricular response (HCC)        Orders Placed This Encounter   Procedures    Comp Metabolic Panel (14) [E]    CBC W Differential W Platelet [E]    UA/M With Culture Reflex [E]       Tootie SUNSHINE Saturnino is a 73 year old female who presents for a pre-operative physical exam. Patient is to have Right Shoulder Arthroscopy Rotator Cuff Repair Arthroscopic Biceps Tenodesis Subacromial Decompression Distal Clavicle Excision Extensive Debridement, to be done by Dr. Torres at [facility] on 10/3/24.     Pt has had surgery before without incident. She has HTN which is well controlled on current regimen. She has a h/o A fib but she has consistently remained in NSR since her cardioversion. She is now off all rate control meds and anticoag other than asa 81 mg daily. Her EKG confirms NSR and no acute ischemic changes. Pending labs, she is of reasonable risk to proceed with surgery as scheduled. She is aware to hold her asa for 1 week prior to surgery  or for as long as deemed necessary by her surgeon. Please feel free to call with questions or concerns.        1.  Preop labs:  pending   2.  Preop EKG:  as above   3.  Patient understands to hold all ASA/NSAiD's/vitamins/supplements for one week prior to surgery.  4.  Follow up after surgery as needed.          Meds & Refills for this Visit:  Requested Prescriptions     Signed Prescriptions Disp Refills    hydroCHLOROthiazide 12.5 MG Oral Tab 90 tablet 1     Sig: Take 1 tablet (12.5 mg total) by mouth daily.       Imaging & Consults:  ELECTROCARDIOGRAM, COMPLETE    Return if symptoms worsen or fail to improve.  There are no Patient Instructions on file for this visit.

## 2024-09-16 ENCOUNTER — LAB ENCOUNTER (OUTPATIENT)
Dept: LAB | Age: 74
End: 2024-09-16
Attending: PHYSICIAN ASSISTANT
Payer: MEDICARE

## 2024-09-16 DIAGNOSIS — Z01.818 PRE-OP EXAM: ICD-10-CM

## 2024-09-16 LAB
ALBUMIN SERPL-MCNC: 3.6 G/DL (ref 3.2–4.8)
ALBUMIN/GLOB SERPL: 1.2 {RATIO} (ref 1–2)
ALP LIVER SERPL-CCNC: 90 U/L
ALT SERPL-CCNC: 21 U/L
ANION GAP SERPL CALC-SCNC: 8 MMOL/L (ref 0–18)
AST SERPL-CCNC: 23 U/L (ref ?–34)
BASOPHILS # BLD AUTO: 0.07 X10(3) UL (ref 0–0.2)
BASOPHILS NFR BLD AUTO: 0.9 %
BILIRUB SERPL-MCNC: 0.3 MG/DL (ref 0.2–1.1)
BILIRUB UR QL STRIP.AUTO: NEGATIVE
BUN BLD-MCNC: 8 MG/DL (ref 9–23)
CALCIUM BLD-MCNC: 10 MG/DL (ref 8.7–10.4)
CHLORIDE SERPL-SCNC: 105 MMOL/L (ref 98–112)
CLARITY UR REFRACT.AUTO: CLEAR
CO2 SERPL-SCNC: 24 MMOL/L (ref 21–32)
CREAT BLD-MCNC: 0.86 MG/DL
EGFRCR SERPLBLD CKD-EPI 2021: 72 ML/MIN/1.73M2 (ref 60–?)
EOSINOPHIL # BLD AUTO: 0.22 X10(3) UL (ref 0–0.7)
EOSINOPHIL NFR BLD AUTO: 3 %
ERYTHROCYTE [DISTWIDTH] IN BLOOD BY AUTOMATED COUNT: 12.6 %
FASTING STATUS PATIENT QL REPORTED: YES
GLOBULIN PLAS-MCNC: 2.9 G/DL (ref 2–3.5)
GLUCOSE BLD-MCNC: 87 MG/DL (ref 70–99)
GLUCOSE UR STRIP.AUTO-MCNC: NORMAL MG/DL
HCT VFR BLD AUTO: 43.5 %
HGB BLD-MCNC: 14.6 G/DL
IMM GRANULOCYTES # BLD AUTO: 0.02 X10(3) UL (ref 0–1)
IMM GRANULOCYTES NFR BLD: 0.3 %
KETONES UR STRIP.AUTO-MCNC: NEGATIVE MG/DL
LEUKOCYTE ESTERASE UR QL STRIP.AUTO: NEGATIVE
LYMPHOCYTES # BLD AUTO: 2.39 X10(3) UL (ref 1–4)
LYMPHOCYTES NFR BLD AUTO: 32.4 %
MCH RBC QN AUTO: 32.7 PG (ref 26–34)
MCHC RBC AUTO-ENTMCNC: 33.6 G/DL (ref 31–37)
MCV RBC AUTO: 97.5 FL
MONOCYTES # BLD AUTO: 0.57 X10(3) UL (ref 0.1–1)
MONOCYTES NFR BLD AUTO: 7.7 %
NEUTROPHILS # BLD AUTO: 4.11 X10 (3) UL (ref 1.5–7.7)
NEUTROPHILS # BLD AUTO: 4.11 X10(3) UL (ref 1.5–7.7)
NEUTROPHILS NFR BLD AUTO: 55.7 %
NITRITE UR QL STRIP.AUTO: NEGATIVE
OSMOLALITY SERPL CALC.SUM OF ELEC: 282 MOSM/KG (ref 275–295)
PH UR STRIP.AUTO: 8 [PH] (ref 5–8)
PLATELET # BLD AUTO: 210 10(3)UL (ref 150–450)
POTASSIUM SERPL-SCNC: 4.2 MMOL/L (ref 3.5–5.1)
PROT SERPL-MCNC: 6.5 G/DL (ref 5.7–8.2)
PROT UR STRIP.AUTO-MCNC: NEGATIVE MG/DL
RBC # BLD AUTO: 4.46 X10(6)UL
RBC UR QL AUTO: NEGATIVE
SODIUM SERPL-SCNC: 137 MMOL/L (ref 136–145)
SP GR UR STRIP.AUTO: 1.01 (ref 1–1.03)
UROBILINOGEN UR STRIP.AUTO-MCNC: NORMAL MG/DL
WBC # BLD AUTO: 7.4 X10(3) UL (ref 4–11)

## 2024-09-16 PROCEDURE — 85025 COMPLETE CBC W/AUTO DIFF WBC: CPT

## 2024-09-16 PROCEDURE — 80053 COMPREHEN METABOLIC PANEL: CPT

## 2024-09-16 PROCEDURE — 81003 URINALYSIS AUTO W/O SCOPE: CPT

## 2024-09-18 PROBLEM — I34.0 NONRHEUMATIC MITRAL (VALVE) INSUFFICIENCY: Status: ACTIVE | Noted: 2023-05-25

## 2024-09-18 PROBLEM — I36.1 NONRHEUMATIC TRICUSPID VALVE REGURGITATION: Status: ACTIVE | Noted: 2024-05-16

## 2024-09-23 ENCOUNTER — TELEPHONE (OUTPATIENT)
Dept: ORTHOPEDICS CLINIC | Facility: CLINIC | Age: 74
End: 2024-09-23

## 2024-09-23 DIAGNOSIS — S46.011A TRAUMATIC COMPLETE TEAR OF RIGHT ROTATOR CUFF, INITIAL ENCOUNTER: Primary | ICD-10-CM

## 2024-09-30 ENCOUNTER — TELEPHONE (OUTPATIENT)
Dept: ORTHOPEDICS CLINIC | Facility: CLINIC | Age: 74
End: 2024-09-30

## 2024-09-30 DIAGNOSIS — Z98.890 S/P ARTHROSCOPY OF SHOULDER: ICD-10-CM

## 2024-09-30 DIAGNOSIS — Z98.890 S/P ARTHROSCOPY OF SHOULDER: Primary | ICD-10-CM

## 2024-09-30 RX ORDER — ONDANSETRON 4 MG/1
TABLET, FILM COATED ORAL
Qty: 8 TABLET | Refills: 0 | Status: SHIPPED | OUTPATIENT
Start: 2024-09-30 | End: 2024-09-30

## 2024-09-30 RX ORDER — TRAMADOL HYDROCHLORIDE 50 MG/1
TABLET ORAL
Qty: 20 TABLET | Refills: 1 | Status: SHIPPED | OUTPATIENT
Start: 2024-09-30

## 2024-09-30 RX ORDER — ONDANSETRON 4 MG/1
TABLET, FILM COATED ORAL
Qty: 8 TABLET | Refills: 0 | Status: SHIPPED | OUTPATIENT
Start: 2024-09-30

## 2024-09-30 NOTE — TELEPHONE ENCOUNTER
Patient called and states that she got notification regarding all her New Rx after the surgery, but she wants them switch to [facility] instead. Please advise.    [id number] [address] AT [facility], [phone], [phone]

## 2024-10-01 ENCOUNTER — PATIENT MESSAGE (OUTPATIENT)
Dept: ORTHOPEDICS CLINIC | Facility: CLINIC | Age: 74
End: 2024-10-01

## 2024-10-01 DIAGNOSIS — Z98.890 S/P ARTHROSCOPY OF SHOULDER: ICD-10-CM

## 2024-10-01 RX ORDER — TRAMADOL HYDROCHLORIDE 50 MG/1
TABLET ORAL
Qty: 20 TABLET | Refills: 1 | Status: SHIPPED | OUTPATIENT
Start: 2024-10-01

## 2024-10-01 NOTE — TELEPHONE ENCOUNTER
From: Tootie Matamoros  To: Sincer De  Sent: 10/1/2024 11:45 AM CDT  Subject: Prescription change to [facility] forTramadol    The medication you prescribed should have been sent to [facility] on [address] [place] not [facility] . Please send the Tramadol prescription to that [facility] - I will not be staying at home Wed prior to and after surgery on Thursday, so I will need to pick it up today at that [facility] in advance for your help.

## 2024-10-09 ENCOUNTER — OFFICE VISIT (OUTPATIENT)
Dept: ORTHOPEDICS CLINIC | Facility: CLINIC | Age: 74
End: 2024-10-09
Payer: MEDICARE

## 2024-10-09 DIAGNOSIS — Z98.890 S/P ARTHROSCOPY OF SHOULDER: Primary | ICD-10-CM

## 2024-10-09 PROCEDURE — 99024 POSTOP FOLLOW-UP VISIT: CPT | Performed by: PHYSICIAN ASSISTANT

## 2024-10-09 RX ORDER — ONDANSETRON 4 MG/1
TABLET, FILM COATED ORAL
Qty: 8 TABLET | Refills: 0 | Status: SHIPPED | OUTPATIENT
Start: 2024-10-09

## 2024-10-09 NOTE — PROGRESS NOTES
[facility]  [address] [place] [address]  [phone]       Name: Tootie Matamoros   MRN: [id number]  Date: 10/9/2024     REASON FOR VISIT: First Post-Surgical Visit   Surgery: Right shoulder arthroscopic rotator cuff repair, biceps tenodesis, subacromial decompression, extensive debridement distal clavicle excision on October 3, 2024.     INTERVAL HISTORY:  Tootie Matamoros is a 73 year old female who returns after the aforementioned procedure.  The post-operative course has been unremarkable with pain well controlled and overall progress noted.     Physical therapy was started and is progressing well.  The patient denies any calf pain or tenderness, fevers, chills, sweats or signs of active infection. The patient has been compliant with the postoperative protocol, and admits to normal bowel and bladder function. No acute issues. 2/10 pain.     ROS: ROS    PE:   There were no vitals filed for this visit.  Estimated body mass index is 28.89 kg/m² as calculated from the following:    Height as of 9/18/24: 5' 6\" (1.676 m).    Weight as of 9/18/24: 179 lb (81.2 kg).    Physical Exam  Constitutional:       Appearance: Normal appearance.   HENT:      Head: Normocephalic and atraumatic.   Eyes:      Extraocular Movements: Extraocular movements intact.   Neck:      Musculoskeletal: Normal range of motion and neck supple.   Cardiovascular:      Pulses: Normal pulses.   Pulmonary:      Effort: Pulmonary effort is normal. No respiratory distress.   Abdominal:      General: There is no distension.   Skin:     General: Skin is warm.      Capillary Refill: Capillary refill takes less than 2 seconds.      Findings: No bruising.   Neurological:      General: No focal deficit present.      Mental Status: She is alert.   Psychiatric:         Mood and Affect: Mood normal.     Examination of the right shoulder demonstrates:     Physical examination the patient is alert and oriented  x3, well-developed, well-nourished, no acute distress.     Tegaderm dressings were removed, and Steri-Strips were maintained and kept in place.    Incisional sites are clean dry intact without signs of active pathology.      The contralateral shoulder is without limitation in range of motion or strength, no positive provocative maneuvers.     IMPRESSION: Tootie Matamoros is a 73 year old female who presents 6 days s/p Right shoulder arthroscopic rotator cuff repair, biceps tenodesis, subacromial decompression, extensive debridement distal clavicle excision on October 3, 2024.     PLAN:   We had a lengthy discussion with the patient regarding the patient's findings consistent with the expected postoperative course. We recommend continuation of physical therapy with rehabilitation efforts focused on strengthening, range of motion, functional ability, and return to baseline activity. The patient can continue to progress per protocol.    All questions were answered appropriately and the patient was in agreement with the treatment plan.       FOLLOW-UP:  Return to clinic in four weeks. No imaging required at next visit.             Hector Kc, [facility], PA-C Orthopedic Surgery / Sports Medicine Specialist  [facility]  [address] [place] [address] [id number] [place] [email or web address]  [email or web address]  t: [phone]  o: [phone]  f: [phone]    This note was dictated using Dragon software.  While it was briefly proofread prior to completion, some grammatical, spelling, and word choice errors due to dictation may still occur.

## 2024-11-01 ENCOUNTER — MED REC SCAN ONLY (OUTPATIENT)
Dept: ORTHOPEDICS CLINIC | Facility: CLINIC | Age: 74
End: 2024-11-01

## 2024-11-06 ENCOUNTER — OFFICE VISIT (OUTPATIENT)
Dept: ORTHOPEDICS CLINIC | Facility: CLINIC | Age: 74
End: 2024-11-06
Payer: MEDICARE

## 2024-11-06 DIAGNOSIS — Z98.890 S/P ARTHROSCOPY OF SHOULDER: Primary | ICD-10-CM

## 2024-11-06 PROCEDURE — 99024 POSTOP FOLLOW-UP VISIT: CPT | Performed by: PHYSICIAN ASSISTANT

## 2024-11-06 NOTE — PROGRESS NOTES
[facility]  [address] [place] [address]  [phone]       Name: Tootie Matamoros   MRN: [id number]  Date: 11/6/2024     REASON FOR VISIT: Second Post-Surgical Visit   Surgery: Right shoulder arthroscopic rotator cuff repair, biceps tenodesis, subacromial decompression, extensive debridement distal clavicle excision on October 3, 2024.        INTERVAL HISTORY:  Tootie Matamoros is a 73 year old female who returns after the aforementioned procedure.  The post-operative course has been unremarkable with pain well controlled and overall progress noted.     Physical therapy was started and is progressing well. 1/10. She has been working with [employer].     ROS: ROS    PE:   There were no vitals filed for this visit.  Estimated body mass index is 28.89 kg/m² as calculated from the following:    Height as of 9/18/24: 5' 6\" (1.676 m).    Weight as of 9/18/24: 179 lb (81.2 kg).    Physical Exam  Constitutional:       Appearance: Normal appearance.   HENT:      Head: Normocephalic and atraumatic.   Eyes:      Extraocular Movements: Extraocular movements intact.   Neck:      Musculoskeletal: Normal range of motion and neck supple.   Cardiovascular:      Pulses: Normal pulses.   Pulmonary:      Effort: Pulmonary effort is normal. No respiratory distress.   Abdominal:      General: There is no distension.   Skin:     General: Skin is warm.      Capillary Refill: Capillary refill takes less than 2 seconds.      Findings: No bruising.   Neurological:      General: No focal deficit present.      Mental Status: She is alert.   Psychiatric:         Mood and Affect: Mood normal.     Examination of the right shoulder demonstrates:     Physical examination the patient is alert and oriented x3, well-developed, well-nourished, no acute distress.     Active to 90, 15 of ER and IR to back pocket.     Incisional sites are clean dry intact without signs of active pathology.      The  contralateral shoulder is without limitation in range of motion or strength, no positive provocative maneuvers.     IMPRESSION: Tootie Matamoros is a 73 year old female who presents 4.5 weeks s/p Right shoulder arthroscopic rotator cuff repair, biceps tenodesis, subacromial decompression, extensive debridement distal clavicle excision on October 3, 2024.      PLAN:   We had a lengthy discussion with the patient regarding the patient's findings consistent with the expected postoperative course. We recommend continuation of physical therapy with rehabilitation efforts focused on strengthening, range of motion, functional ability, and return to baseline activity. The patient can continue to progress per protocol.    We will change PT - to [facility] in [place].   All questions were answered appropriately and the patient was in agreement with the treatment plan.       FOLLOW-UP:  Return to clinic in four weeks. No imaging required at next visit.             Hector Kc [facility], PA-C Orthopedic Surgery / Sports Medicine Specialist  [facility]  [address] [place] [email or web address]  [email or web address]  t: [phone]  o: [phone]  f: [phone]    This note was dictated using Dragon software.  While it was briefly proofread prior to completion, some grammatical, spelling, and word choice errors due to dictation may still occur.

## 2024-11-13 NOTE — TELEPHONE ENCOUNTER
Refill requested. Per office visit notes 9/11/24, patient is currently on hydrochlorothiazide 12.5 daily in addition to combination tab below.     Requested Prescriptions     Pending Prescriptions Disp Refills    Irbesartan-hydroCHLOROthiazide 150-12.5 MG Oral Tab 90 tablet 0     Sig: Take 1 tablet by mouth daily.

## 2024-11-17 ENCOUNTER — PATIENT MESSAGE (OUTPATIENT)
Dept: INTERNAL MEDICINE CLINIC | Facility: CLINIC | Age: 74
End: 2024-11-17

## 2024-11-18 NOTE — TELEPHONE ENCOUNTER
Please review.  Protocol failed / Has no protocol.     Requested Prescriptions   Pending Prescriptions Disp Refills    Irbesartan-hydroCHLOROthiazide 150-12.5 MG Oral Tab 90 tablet 3     Sig: Take 1 tablet by mouth daily.       Hypertension Medications Protocol Failed - 11/18/2024  2:15 PM        Failed - Last BP reading less than 140/90     BP Readings from Last 1 Encounters:   10/03/24 159/85               Passed - CMP or BMP in past 12 months        Passed - In person appointment or virtual visit in the past 12 mos or appointment in next 3 mos     Recent Outpatient Visits              1 week ago S/P arthroscopy of shoulder    [address] Hector Kc PA    Office Visit    1 month ago S/P arthroscopy of shoulder    [address] Hector Kc PA    Office Visit    2 months ago Pre-op exam    [address] Patricia Self PA-C    Office Visit    2 months ago Cervical radiculopathy at [address], [place] Alfredito Camarillo DO    Telemedicine    3 months ago Dysuria    [address] Sheeba Rogers APRN    Office Visit          Future Appointments         Provider Department Appt Notes    In 2 weeks Hector Kc PA [address] - [facility] ov 11.6.24 no imaging is required *RIGHT SHOULDER ARTHROSCOPY RCR EOS DOS 10/03/24;    In 3 weeks Patricia Self PA-C [address] -fxub 10/23                    Passed - EGFRCR or GFRNAA > 50     GFR Evaluation  EGFRCR: 72 , resulted on 9/16/2024             Future Appointments         Provider Department Appt Notes    In 2 weeks Hector Kc PA [address] - per ov 11.6.24 no imaging is required *RIGHT SHOULDER ARTHROSCOPY RCR EOS  DOS 10/03/24;    In 3 weeks Patricia Self PA-C [address] -mrxv 10/23          Recent Outpatient Visits              1 week ago S/P arthroscopy of shoulder    [address] Hector Kc PA    Office Visit    1 month ago S/P arthroscopy of shoulder    [address] Hector Kc, PA    Office Visit    2 months ago Pre-op exam    [address] Patricia Self PA-C    Office Visit    2 months ago Cervical radiculopathy at C8    [facility] [place] Alfredito Camarillo,     Telemedicine    3 months ago Dysuria    [address] Sheeba Rogers APRN    Office Visit

## 2024-11-19 RX ORDER — IRBESARTAN AND HYDROCHLOROTHIAZIDE 150; 12.5 MG/1; MG/1
1 TABLET, FILM COATED ORAL DAILY
Qty: 90 TABLET | Refills: 3 | Status: SHIPPED | OUTPATIENT
Start: 2024-11-19

## 2024-12-04 ENCOUNTER — OFFICE VISIT (OUTPATIENT)
Dept: ORTHOPEDICS CLINIC | Facility: CLINIC | Age: 74
End: 2024-12-04
Payer: MEDICARE

## 2024-12-04 DIAGNOSIS — Z98.890 S/P ARTHROSCOPY OF SHOULDER: Primary | ICD-10-CM

## 2024-12-04 PROCEDURE — 99024 POSTOP FOLLOW-UP VISIT: CPT | Performed by: PHYSICIAN ASSISTANT

## 2024-12-04 NOTE — PROGRESS NOTES
[facility]  [address] [place] [address]  [phone]       Name: Tootie Matamoros   MRN: [id number]  Date: 12/4/2024     REASON FOR VISIT: Third Post-Surgical Visit   Surgery: Right shoulder arthroscopic rotator cuff repair, biceps tenodesis, subacromial decompression, extensive debridement distal clavicle excision on October 3, 2024.     INTERVAL HISTORY:  Tootie Matamoros is a 74 year old female who returns after the aforementioned procedure.  The post-operative course has been unremarkable with pain well controlled and overall progress noted.     Physical therapy was started and is progressing well.  Overall she is doing well.  She rates her pain to be a 1 out of 10 and is working with [employer] in [place].  She has noted improvement.     ROS: ROS    PE:   There were no vitals filed for this visit.  Estimated body mass index is 28.89 kg/m² as calculated from the following:    Height as of 9/18/24: 5' 6\" (1.676 m).    Weight as of 9/18/24: 179 lb (81.2 kg).    Physical Exam  Constitutional:       Appearance: Normal appearance.   HENT:      Head: Normocephalic and atraumatic.   Eyes:      Extraocular Movements: Extraocular movements intact.   Neck:      Musculoskeletal: Normal range of motion and neck supple.   Cardiovascular:      Pulses: Normal pulses.   Pulmonary:      Effort: Pulmonary effort is normal. No respiratory distress.   Abdominal:      General: There is no distension.   Skin:     General: Skin is warm.      Capillary Refill: Capillary refill takes less than 2 seconds.      Findings: No bruising.   Neurological:      General: No focal deficit present.      Mental Status: She is alert.   Psychiatric:         Mood and Affect: Mood normal.     Examination of the right shoulder demonstrates:     Physical examination the patient is alert and oriented x3, well-developed, well-nourished, no acute distress.     145 of forward flexion, 40 of ER, and IR to  back pocket. 5/5 strength.     Incisional sites are clean dry intact without signs of active pathology.      The contralateral shoulder is without limitation in range of motion or strength, no positive provocative maneuvers.     IMPRESSION: Tootie Matamoros is a 74 year old female who presents 2 months s/p Right shoulder arthroscopic rotator cuff repair, biceps tenodesis, subacromial decompression, extensive debridement distal clavicle excision on October 3, 2024.     PLAN:   We had a lengthy discussion with the patient regarding the patient's findings consistent with the expected postoperative course. We recommend continuation of physical therapy with rehabilitation efforts focused on strengthening, range of motion, functional ability, and return to baseline activity. The patient can continue to progress per protocol.    All questions were answered appropriately and the patient was in agreement with the treatment plan.       FOLLOW-UP:  Return to clinic in eight weeks. No imaging required at next visit.             Hector Kc [facility], PA-C Orthopedic Surgery / Sports Medicine Specialist  [facility]  [address] [place] [address] [id number] [place] [email or web address]  [email or web address]  t: [phone]  o: [phone]  f: [phone]    This note was dictated using Dragon software.  While it was briefly proofread prior to completion, some grammatical, spelling, and word choice errors due to dictation may still occur.

## 2024-12-10 ENCOUNTER — MED REC SCAN ONLY (OUTPATIENT)
Facility: CLINIC | Age: 74
End: 2024-12-10

## 2024-12-11 ENCOUNTER — OFFICE VISIT (OUTPATIENT)
Dept: INTERNAL MEDICINE CLINIC | Facility: CLINIC | Age: 74
End: 2024-12-11
Payer: MEDICARE

## 2024-12-11 ENCOUNTER — MED REC SCAN ONLY (OUTPATIENT)
Facility: CLINIC | Age: 74
End: 2024-12-11

## 2024-12-11 VITALS
DIASTOLIC BLOOD PRESSURE: 68 MMHG | RESPIRATION RATE: 18 BRPM | WEIGHT: 175 LBS | SYSTOLIC BLOOD PRESSURE: 110 MMHG | TEMPERATURE: 97 F | BODY MASS INDEX: 28.13 KG/M2 | OXYGEN SATURATION: 97 % | HEART RATE: 68 BPM | HEIGHT: 66 IN

## 2024-12-11 DIAGNOSIS — Z12.31 ENCOUNTER FOR SCREENING MAMMOGRAM FOR MALIGNANT NEOPLASM OF BREAST: ICD-10-CM

## 2024-12-11 DIAGNOSIS — I36.1 NONRHEUMATIC TRICUSPID VALVE REGURGITATION: ICD-10-CM

## 2024-12-11 DIAGNOSIS — I34.0 NONRHEUMATIC MITRAL (VALVE) INSUFFICIENCY: ICD-10-CM

## 2024-12-11 DIAGNOSIS — I10 ESSENTIAL HYPERTENSION: ICD-10-CM

## 2024-12-11 DIAGNOSIS — Z00.00 ROUTINE GENERAL MEDICAL EXAMINATION AT A HEALTH CARE FACILITY: Primary | ICD-10-CM

## 2024-12-11 DIAGNOSIS — L65.9 HAIR LOSS: ICD-10-CM

## 2024-12-11 DIAGNOSIS — I48.91 ATRIAL FIBRILLATION WITH RAPID VENTRICULAR RESPONSE (HCC): ICD-10-CM

## 2024-12-11 DIAGNOSIS — Z12.11 SCREEN FOR COLON CANCER: ICD-10-CM

## 2024-12-11 PROBLEM — R06.02 SOB (SHORTNESS OF BREATH): Status: RESOLVED | Noted: 2023-11-07 | Resolved: 2024-12-11

## 2024-12-11 RX ORDER — HYDROCHLOROTHIAZIDE 12.5 MG/1
12.5 TABLET ORAL DAILY
Qty: 90 TABLET | Refills: 3 | Status: SHIPPED | OUTPATIENT
Start: 2024-12-11

## 2024-12-18 NOTE — PROGRESS NOTES
HOME CARE INSTRUCTIONS    ACTIVITY: Rest and take it easy for the first 24 hours.  A responsible adult is recommended to remain with you during that time.  It is normal to feel sleepy.  We encourage you to not do anything that requires balance, judgment or coordination.    FOR 24 HOURS DO NOT:  Drive, operate machinery or run household appliances.  Drink beer or alcoholic beverages.  Make important decisions or sign legal documents.    SPECIAL INSTRUCTIONS:   Inguinal Hernia Repair Discharge Instructions:    1. DIET: You may resume your normal preoperative diet.    2. ACTIVITIES: You may resume routine activities. Heavy lifting (over 15 pounds) and strenuous activities should be avoided for one month after surgery. Routine activities of daily living such as walking, going up and down stairs are acceptable.    3. DRIVING: You may drive whenever you are no longer taking narcotic pain medications and are able to perform the activities needed to drive safely, i.e. turning, bending, twisting, etc.    4. BATHING: You have waterproof glue covering your incisions, so you can shower anytime but not baths for 1 week after surgery. The glue will peel off after a few days.      5. BOWEL FUNCTION: Constipation is common after surgery. If you feel this is occurring, take a laxative (Milk of Magnesia, Ex-Lax, Senokot, Miralax, Magnesium Citrate) until the problem has resolved.    6. PAIN MEDICATION: You will be given a prescription for pain medication. Please take these as directed. You may also use Advil/Motrin/Ibuprofen to help reduce pain in addition to your pain medication. It is important to remember not to take medications on an empty stomach as this may cause nausea. Do not consume alcohol while taking pain medication.  You can put ice packs on the groin(s) if it helps for additional pain relief.  Ice should be applied for 20 minutes at a time, with a 20 minute break before reapplying.    7. WHAT TO EXPECT: You may develop  REASON FOR VISIT:    Tootie Matamoros is a 74 year old female who presents for a Medicare Annual Wellness visit.    Had shoulder surgery 2 months ago. Still in PT. Better but still working on improving ROM.   She notes that 1 week after surgery she developed hair loss. Hair was falling out in clumps. No bald patches. Hair loss has improved since then but still present.       Patient Care Team: Patient Care Team:  Migue Shook DO as PCP - General (Internal Medicine)  Bob García DO as Consulting Physician (NEUROLOGY)  Patricia Self PA-C (Physician Assistant Medical)    Patient Active Problem List   Diagnosis    Essential hypertension    Atrial fibrillation with rapid ventricular response (HCC)    Nonrheumatic mitral (valve) insufficiency    Nonrheumatic tricuspid valve regurgitation     Current Outpatient Medications   Medication Sig Dispense Refill    hydroCHLOROthiazide 12.5 MG Oral Tab Take 1 tablet (12.5 mg total) by mouth daily. 90 tablet 3    Irbesartan-hydroCHLOROthiazide 150-12.5 MG Oral Tab Take 1 tablet by mouth daily. 90 tablet 3    aspirin 81 MG Oral Tab EC Take 1 tablet (81 mg total) by mouth daily.      Mometasone Furoate 0.1 % External Solution Apply to scalp twice daily 60 mL 1    Fexofenadine HCl (ALLEGRA OR) Take 1 tablet by mouth daily as needed.      Pseudoephedrine HCl (PSEUDO OR) Take 1 tablet by mouth daily as needed.      Cholecalciferol (VITAMIN D3) 75 MCG (3000 UT) Oral Tab Take 1 tablet by mouth daily.             Latest Ref Rng & Units 9/16/2024     8:13 AM 10/23/2023     3:14 PM 9/22/2023     2:04 PM 5/30/2023     8:10 AM 4/27/2023     8:28 PM 4/27/2023    12:54 PM 4/18/2023     2:29 PM   Glucose and HbA1c   Glucose 70 - 99 mg/dL 87  101  88  96  84  81  109          Latest Ref Rng & Units 4/28/2023     7:31 AM 10/5/2022     9:08 AM 9/2/2021     7:48 AM 10/29/2020     8:02 AM 10/2/2019     8:59 AM 10/20/2017     7:36 AM   Cholesterol   Total Cholesterol <200  mg/dL 115  150  163  143  150  159    Triglycerides 30 - 149 mg/dL 50  61  66  62  53  51    HDL 40 - 59 mg/dL 63  74  68  62  67  96    LDL <100 mg/dL 40  64  82  67  70  53          Latest Ref Rng & Units 9/16/2024     8:13 AM 10/23/2023     3:14 PM 9/22/2023     2:04 PM 5/30/2023     8:10 AM 4/27/2023     8:28 PM 4/27/2023    12:54 PM 4/18/2023     2:29 PM   BUN and Cr   BUN 9 - 23 mg/dL 8  19  17  16  15  16  22    Creatinine 0.55 - 1.02 mg/dL 0.86  0.95  0.92  1.11  0.91  0.96  1.04          Latest Ref Rng & Units 9/16/2024     8:13 AM 10/23/2023     3:14 PM 9/22/2023     2:04 PM 4/27/2023    12:54 PM 4/18/2023     2:29 PM 10/5/2022     9:08 AM 9/2/2021     7:48 AM   AST and ALT   AST (SGOT) <34 U/L 23  30  25  107  26  21  16    ALT (SGPT) 10 - 49 U/L 21  51  35  130  37  36  27          Latest Ref Rng & Units 9/22/2023     2:04 PM 4/18/2023     2:29 PM 10/5/2022     9:08 AM 10/2/2019     8:59 AM 7/30/2018     8:59 AM   TSH and Free T4   TSH 0.358 - 3.740 mIU/mL 1.280  1.540  1.940  2.08  1.81         General Health      In the past six months, have you lost more than 10 pounds without trying?: (Patient-Rptd) 3 - Don't know  Has your appetite been poor?: (Patient-Rptd) No  Type of Diet: (Patient-Rptd) Balanced  How does the patient maintain a good energy level?: (Patient-Rptd) Appropriate Exercise  How would you describe your daily physical activity?: (Patient-Rptd) Moderate  How would you describe your current health state?: (Patient-Rptd) Good  How do you maintain positive mental well-being?: (Patient-Rptd) Social Interaction;Visiting Friends;Visiting Family  Have you had any immunizations at another office such as Influenza, Hepatitis B, Tetanus, or Pneumococcal?: (Patient-Rptd) No     Functional Ability     Bathing or Showering: (Patient-Rptd) Able without help  Toileting: (Patient-Rptd) Able without help  Dressing: (Patient-Rptd) Able without help  Eating: (Patient-Rptd) Able without help  Driving:  swelling and bruising at the base of your genitalia and within the scrotum (males) or labias (females). This is common and may take several weeks to resolve.      8. CALL IF YOU HAVE: (1) Fevers to more than 101.5F, (2) Unusual chest or leg pain, (3) Drainage or fluid from incision that may be foul smelling, increased tenderness or soreness at the wound or the wound edges are no longer together, redness or swelling at the incision site.  (4) Profound swelling at the incision site or in the genitals.    9. FOLLOW-UP: Call and schedule a follow up appointment in 2 weeks.  If you have any additional questions at all, please do not hesitate to call the office and speak to my medical assistant, myself, or the physician on call.    Marco Antonio Miller MD  [facility]  [address] [place] [address], [address], [place] [address]  [phone]      DIET: To avoid nausea, slowly advance diet as tolerated, avoiding spicy or greasy foods for the first day.  Add more substantial food to your diet according to your physician's instructions.  Babies can be fed formula or breast milk as soon as they are hungry.  INCREASE FLUIDS AND FIBER TO AVOID CONSTIPATION.    SURGICAL DRESSING/BATHING: see above    MEDICATIONS: Resume taking daily medication.  Take prescribed pain medication with food.  If no medication is prescribed, you may take non-aspirin pain medication if needed.  PAIN MEDICATION CAN BE VERY CONSTIPATING.  Take a stool softener or laxative such as senokot, pericolace, or milk of magnesia if needed.    Prescription given for Norco.    A follow-up appointment should be arranged with your doctor in 1-2 weeks; call to schedule.    You should CALL YOUR PHYSICIAN if you develop:  Fever greater than 101 degrees F.  Pain not relieved by medication, or persistent nausea or vomiting.  Excessive bleeding (blood soaking through dressing) or unexpected drainage from the wound.  Extreme redness or swelling around the incision site, drainage  (Patient-Rptd) Able without help  Preparing your meals: (Patient-Rptd) Able without help  Managing money/bills: (Patient-Rptd) Able without help  Taking medications as prescribed: (Patient-Rptd) Able without help  Are you able to afford your medications?: (Patient-Rptd) Yes  Hearing Problems?: (Patient-Rptd) No     Functional Status     Hearing Problems?: (Patient-Rptd) No  Vision Problems? : (Patient-Rptd) No  Difficulty walking?: (Patient-Rptd) No  Difficulty dressing or bathing?: (Patient-Rptd) No  Problems with daily activities? : (Patient-Rptd) No  Memory Problems?: (Patient-Rptd) No      Fall/Risk Assessment                 Depression Screening (PHQ-2/PHQ-9): Over the LAST 2 WEEKS            Advance Directives     Do you have a healthcare power of ?: Yes     Cognitive Assessment     What day of the week is this?: Correct  What month is it?: Correct  What year is it?: Correct  Recall \"Ball\": Correct  Recall \"Flag\": Correct  Recall \"Tree\": Correct         PREVENTATIVE SERVICES   INDICATIONS AND SCHEDULE Internal Lab or Procedure   Diabetes Screening     HbgA1C   Annually No results found for: \"A1C\"    Fasting Blood Sugar (FSB)Annually Glucose (mg/dL)   Date Value   09/16/2024 87     GLUCOSE (mg/dL)   Date Value   10/29/2020 97      Cardiovascular Disease Screening    LDL Annually LDL Cholesterol (mg/dL)   Date Value   04/28/2023 40     LDL-CHOLESTEROL (mg/dL (calc))   Date Value   10/29/2020 67       EKG - w/ Initial Preventative Physical Exam only, or if medically necessary    Colorectal Cancer Screening     Colonoscopy Screen every 10 years Health Maintenance   Topic Date Due    Colorectal Cancer Screening  11/02/2024       Flex Sigmoidoscopy Screen every 5 years No results found for this or any previous visit.    Fecal Occult Blood Annually Occult Blood Result (no units)   Date Value   11/18/2019 Negative for Occult Blood   11/18/2019 Negative for Occult Blood   11/18/2019 Negative for Occult Blood  of pus or foul smelling drainage.  Inability to urinate or empty your bladder within 8 hours.  Problems with breathing or chest pain.    You should call 911 if you develop problems with breathing or chest pain.  If you are unable to contact your doctor or surgical center, you should go to the nearest emergency room or urgent care center.  Physician's telephone #: [phone]     MILD FLU-LIKE SYMPTOMS ARE NORMAL.  YOU MAY EXPERIENCE GENERALIZED MUSCLE ACHES, THROAT IRRITATION, HEADACHE AND/OR SOME NAUSEA.    If any questions arise, call your doctor.  If your doctor is not available, please feel free to call the Surgical Center at [phone].  The Center is open Monday through Friday from 7AM to 7PM.      A registered nurse may call you a few days after your surgery to see how you are doing after your procedure.    You may also receive a survey in the mail within the next two weeks and we ask that you take a few moments to complete the survey and return it to us.  Our goal is to provide you with very good care and we value your comments.     Depression / Suicide Risk    As you are discharged from this [facility] facility, it is important to learn how to keep safe from harming yourself.    Recognize the warning signs:  Abrupt changes in personality, positive or negative- including increase in energy   Giving away possessions  Change in eating patterns- significant weight changes-  positive or negative  Change in sleeping patterns- unable to sleep or sleeping all the time   Unwillingness or inability to communicate  Depression  Unusual sadness, discouragement and loneliness  Talk of wanting to die  Neglect of personal appearance   Rebelliousness- reckless behavior  Withdrawal from people/activities they love  Confusion- inability to concentrate     If you or a loved one observes any of these behaviors or has concerns about self-harm, here's what you can do:  Talk about it- your feelings and reasons for harming      Occult Blood (no units)   Date Value   2023 Negative      Glaucoma Screening     Ophthalmology Visit Annually    Immunizations     Zoster (Not covered by Medicare Part B) No orders found for this or any previous visit.     SPECIFIC DISEASE MONITORING Internal Lab or Procedure     Annual Monitoring of Persistent Medications  (ACE/ARB, Digoxin, Diuretics)        Potassium  Annually Potassium (mmol/L)   Date Value   2024 4.2     POTASSIUM (mmol/L)   Date Value   10/29/2020 3.9       Creatinine  Annually CREATININE (mg/dL)   Date Value   10/29/2020 0.91     Creatinine (mg/dL)   Date Value   2024 0.86       Digoxin Serum Conc  Annually No results found for: \"DIGOXIN\"          ALLERGIES:   Allergies[1]  MEDICAL INFORMATION:   Past Medical History:    Allergic rhinitis    Arrhythmia    afibcontrolled with meds    Essential hypertension    High blood pressure    Visual impairment    contacts and glasses      Past Surgical History:   Procedure Laterality Date    Ep cardioversion 1x  06/23/2023         Hysterectomy              Family History   Problem Relation Age of Onset    Hypertension Mother     Stroke Paternal Grandmother      Immunization History      Immunization History  Administered            Date(s) Administered    Covid-19 Vaccine Pfizer 30 mcg/0.3 ml                          2021  03/10/2021      FLU VAC High Dose 65 YRS & Older PRSV Free (83488)                          2020      FLU VAC QIV SPLIT 3 YRS AND OLDER (04870)                          10/26/2015  2016      FLUAD High Dose 65 yr and older (64322)                          10/19/2018      FLULAVAL 6 months & older 0.5 ml Prefilled syringe (84936)                          2017      FLUZONE 6 months and older PFS 0.5 ml (12400)                          2017      Fluzone Vaccine Medicare ()                          2020      Pneumococcal (Prevnar 13)                   yourself  Remove any means that you might use to hurt yourself (examples: pills, rope, extension cords, firearm)  Get professional help from the community (Mental Health, Substance Abuse, psychological counseling)  Do not be alone:Call your Safe Contact- someone whom you trust who will be there for you.  Call your local CRISIS HOTLINE 190-7030 or 029-536-7304  Call your local Children's Mobile Crisis Response Team [place] (864) 197-4871 or www.[employer]  Call the toll free National Suicide Prevention Hotlines   National Suicide Prevention Lifeline 374-277-ODVM (5946)  National Hope Line Network 800-SUICIDE (098-4350)    I acknowledge receipt and understanding of these Home Care instructions.           01/19/2018      Pneumovax 23          11/06/2019      TDAP                  08/05/2012      Zoster Vaccine Recombinant Adjuvanted (Shingrix)                          07/29/2020 11/16/2020        SOCIAL HISTORY:   Social History     Socioeconomic History    Marital status:    Tobacco Use    Smoking status: Never     Passive exposure: Never    Smokeless tobacco: Never   Vaping Use    Vaping status: Never Used   Substance and Sexual Activity    Alcohol use: Yes     Alcohol/week: 1.0 standard drink of alcohol     Types: 1 Standard drinks or equivalent per week     Comment: CAGE 11/16/20    Drug use: Never   Other Topics Concern    Caffeine Concern Yes    Exercise Yes    Seat Belt Yes    Special Diet No    Stress Concern No    Weight Concern Yes     Social Drivers of Health     Physical Activity: High Risk (11/27/2023)    Received from [email or web address], [email or web address], [email or web address]    Exercise Vital Sign     On average, how many days per week do you engage in moderate to strenuous exercise (like a brisk walk)?: 0 days     On average, how many minutes do you engage in exercise at this level?: 0 min        REVIEW OF SYSTEMS:   GENERAL: feels well otherwise  SKIN: denies any unusual skin lesions  EYES: denies blurred vision or double vision  HEENT: denies nasal congestion, sinus pain or ST  LUNGS: denies shortness of breath with exertion  CARDIOVASCULAR: denies chest pain on exertion  GI: denies abdominal pain, denies heartburn  : denies dysuria, vaginal discharge or itching  MUSCULOSKELETAL: denies back pain  NEURO: denies headaches  PSYCHE: denies depression or anxiety  HEMATOLOGIC: denies hx of anemia  ENDOCRINE: denies thyroid history  ALL/ASTHMA: denies hx of allergy or asthma    EXAM:   /68   Pulse 68   Temp 97.1 °F (36.2 °C)   Resp 18   Ht 5' 6\" (1.676 m)   Wt 175 lb (79.4 kg)   SpO2 97%   BMI 28.25 kg/m²    >   BP Readings from Last 3 Encounters:   12/11/24  110/68   10/03/24 159/85   09/11/24 120/72     GENERAL: well developed, well nourished, in no apparent distress   SKIN: no rashes, no suspicious lesions  HEENT: atraumatic, normocephalic, ears and throat are clear                Hearing Assessed via:  hearing intact b/l   EYES: PERRLA, EOMI, conjunctiva are clear    NECK: supple, no adenopathy, no bruits  CHEST: no chest tenderness  BREAST:deferred   LUNGS: clear to auscultation  CARDIO: RRR without murmur  GI: good BS's, no masses, HSM or tenderness  : deferred  RECTAL: deferred  MUSCULOSKELETAL: back is not tender, FROM of the back  EXTREMITIES: no cyanosis, clubbing or edema  NEURO: Oriented times three, cranial nerves are intact, motor and sensory are grossly intact      ASSESSMENT AND OTHER RELEVANT CHRONIC CONDITIONS:   Tootie Matamoros is a 74 year old female who presents for a Medicare Assessment.     PLAN SUMMARY:   Diagnoses and all orders for this visit:    1. Routine general medical examination at a health care facility  Check labs   Declined flu vaccine today - will complete at the pharmacy   Due for mammogram   Due for colon cancer screening   - Comp Metabolic Panel (14) [E]; Future  - Lipid Panel [E]; Future  - CBC W Differential W Platelet [E]; Future    2. Atrial fibrillation with rapid ventricular response (HCC)  Followed by cardiology   S/p ablation - no known recurrences   On asa 81 mg daily     3. Essential hypertension  Well controlled with irbesartan-hydrochlorothiazide 150-12.5 mg daily and an extra hydrochlorothiazide 12.5 mg daily   CPM     4. Screen for colon cancer  Prefers FIT - stool cards given today   - Occult Blood, Fecal, Immunoassay (Blue cards) [E]; Future    5. Encounter for screening mammogram for malignant neoplasm of breast  - [facility] SUREKHA 2D+3D SCREENING BILAT (CPT=77067/59883); Future    6. Hair loss  Likely due to stress from surgery   Check labs to r/o other underlying etiology   - CBC W Differential W Platelet [E];  Future  - TSH W Reflex To Free T4 [E]; Future  - Ferritin [E]; Future  - Iron And Tibc [E]; Future    7. Nonrheumatic tricuspid valve regurgitation  Followed by cardiology     8. Nonrheumatic mitral (valve) insufficiency  Followed by cardiology          The patient indicates understanding of these issues and agrees to the plan.  The patient is asked to return in 1 year for AWV  Diet counseling perfomed  Exercise counseling perfomed    SUGGESTED VACCINATIONS - Influenza, Pneumococcal, Zoster, Tetanus   Influenza: Influenza Vaccine(1) due on 10/01/2024  Pneumonia: No recommendations at this time              [1] No Known Allergies

## 2024-12-19 ENCOUNTER — LAB ENCOUNTER (OUTPATIENT)
Dept: LAB | Age: 74
End: 2024-12-19
Attending: PHYSICIAN ASSISTANT
Payer: MEDICARE

## 2024-12-19 DIAGNOSIS — Z00.00 ROUTINE GENERAL MEDICAL EXAMINATION AT A HEALTH CARE FACILITY: ICD-10-CM

## 2024-12-19 DIAGNOSIS — L65.9 HAIR LOSS: ICD-10-CM

## 2024-12-19 LAB
ALBUMIN SERPL-MCNC: 4.1 G/DL (ref 3.2–4.8)
ALBUMIN/GLOB SERPL: 1.7 {RATIO} (ref 1–2)
ALP LIVER SERPL-CCNC: 104 U/L
ALT SERPL-CCNC: 25 U/L
ANION GAP SERPL CALC-SCNC: 9 MMOL/L (ref 0–18)
AST SERPL-CCNC: 24 U/L (ref ?–34)
BASOPHILS # BLD AUTO: 0.08 X10(3) UL (ref 0–0.2)
BASOPHILS NFR BLD AUTO: 1.5 %
BILIRUB SERPL-MCNC: 1.1 MG/DL (ref 0.2–1.1)
BUN BLD-MCNC: 13 MG/DL (ref 9–23)
CALCIUM BLD-MCNC: 9.9 MG/DL (ref 8.7–10.4)
CHLORIDE SERPL-SCNC: 110 MMOL/L (ref 98–112)
CHOLEST SERPL-MCNC: 160 MG/DL (ref ?–200)
CO2 SERPL-SCNC: 24 MMOL/L (ref 21–32)
CREAT BLD-MCNC: 0.89 MG/DL
DEPRECATED HBV CORE AB SER IA-ACNC: 96 NG/ML
EGFRCR SERPLBLD CKD-EPI 2021: 69 ML/MIN/1.73M2 (ref 60–?)
EOSINOPHIL # BLD AUTO: 0.12 X10(3) UL (ref 0–0.7)
EOSINOPHIL NFR BLD AUTO: 2.3 %
ERYTHROCYTE [DISTWIDTH] IN BLOOD BY AUTOMATED COUNT: 12.7 %
FASTING PATIENT LIPID ANSWER: YES
FASTING STATUS PATIENT QL REPORTED: YES
GLOBULIN PLAS-MCNC: 2.4 G/DL (ref 2–3.5)
GLUCOSE BLD-MCNC: 86 MG/DL (ref 70–99)
HCT VFR BLD AUTO: 42 %
HDLC SERPL-MCNC: 76 MG/DL (ref 40–59)
HGB BLD-MCNC: 13.9 G/DL
IMM GRANULOCYTES # BLD AUTO: 0.03 X10(3) UL (ref 0–1)
IMM GRANULOCYTES NFR BLD: 0.6 %
IRON SATN MFR SERPL: 33 %
IRON SERPL-MCNC: 95 UG/DL
LDLC SERPL CALC-MCNC: 74 MG/DL (ref ?–100)
LYMPHOCYTES # BLD AUTO: 1.62 X10(3) UL (ref 1–4)
LYMPHOCYTES NFR BLD AUTO: 30.6 %
MCH RBC QN AUTO: 31.8 PG (ref 26–34)
MCHC RBC AUTO-ENTMCNC: 33.1 G/DL (ref 31–37)
MCV RBC AUTO: 96.1 FL
MONOCYTES # BLD AUTO: 0.56 X10(3) UL (ref 0.1–1)
MONOCYTES NFR BLD AUTO: 10.6 %
NEUTROPHILS # BLD AUTO: 2.88 X10 (3) UL (ref 1.5–7.7)
NEUTROPHILS # BLD AUTO: 2.88 X10(3) UL (ref 1.5–7.7)
NEUTROPHILS NFR BLD AUTO: 54.4 %
NONHDLC SERPL-MCNC: 84 MG/DL (ref ?–130)
OSMOLALITY SERPL CALC.SUM OF ELEC: 295 MOSM/KG (ref 275–295)
PLATELET # BLD AUTO: 201 10(3)UL (ref 150–450)
POTASSIUM SERPL-SCNC: 3.6 MMOL/L (ref 3.5–5.1)
PROT SERPL-MCNC: 6.5 G/DL (ref 5.7–8.2)
RBC # BLD AUTO: 4.37 X10(6)UL
SODIUM SERPL-SCNC: 143 MMOL/L (ref 136–145)
TOTAL IRON BINDING CAPACITY: 289 UG/DL (ref 250–425)
TRANSFERRIN SERPL-MCNC: 220 MG/DL (ref 250–380)
TRIGL SERPL-MCNC: 44 MG/DL (ref 30–149)
TSI SER-ACNC: 2.14 UIU/ML (ref 0.55–4.78)
VLDLC SERPL CALC-MCNC: 7 MG/DL (ref 0–30)
WBC # BLD AUTO: 5.3 X10(3) UL (ref 4–11)

## 2024-12-19 PROCEDURE — 80053 COMPREHEN METABOLIC PANEL: CPT

## 2024-12-19 PROCEDURE — 82728 ASSAY OF FERRITIN: CPT

## 2024-12-19 PROCEDURE — 85025 COMPLETE CBC W/AUTO DIFF WBC: CPT

## 2024-12-19 PROCEDURE — 83550 IRON BINDING TEST: CPT

## 2024-12-19 PROCEDURE — 83540 ASSAY OF IRON: CPT

## 2024-12-19 PROCEDURE — 80061 LIPID PANEL: CPT

## 2024-12-19 PROCEDURE — 36415 COLL VENOUS BLD VENIPUNCTURE: CPT

## 2024-12-19 PROCEDURE — 84443 ASSAY THYROID STIM HORMONE: CPT

## 2024-12-20 ENCOUNTER — LAB ENCOUNTER (OUTPATIENT)
Dept: LAB | Age: 74
End: 2024-12-20
Attending: PHYSICIAN ASSISTANT
Payer: MEDICARE

## 2025-01-16 ENCOUNTER — HOSPITAL ENCOUNTER (OUTPATIENT)
Age: 75
Discharge: HOME | End: 2025-01-16
Attending: PHYSICIAN ASSISTANT

## 2025-01-16 DIAGNOSIS — Z12.31 VISIT FOR SCREENING MAMMOGRAM: ICD-10-CM

## 2025-01-16 PROCEDURE — 77067 SCR MAMMO BI INCL CAD: CPT

## 2025-01-23 ENCOUNTER — TELEPHONE (OUTPATIENT)
Dept: INTERNAL MEDICINE CLINIC | Facility: CLINIC | Age: 75
End: 2025-01-23

## 2025-01-24 ENCOUNTER — TELEPHONE (OUTPATIENT)
Dept: INTERNAL MEDICINE CLINIC | Facility: CLINIC | Age: 75
End: 2025-01-24

## 2025-01-28 NOTE — TELEPHONE ENCOUNTER
Spoke with patient regarding normal mammogram results and to repeat in one year. Pt verbalizes understanding.

## 2025-01-30 ENCOUNTER — NURSE TRIAGE (OUTPATIENT)
Dept: INTERNAL MEDICINE CLINIC | Facility: CLINIC | Age: 75
End: 2025-01-30

## 2025-01-30 NOTE — TELEPHONE ENCOUNTER
Action Requested: Summary for Provider     []  Critical Lab, Recommendations Needed  [] Need Additional Advice  []   FYI    []   Need Orders  [] Need Medications Sent to Pharmacy  []  Other     SUMMARY: Received call from pt. Patient stated she has had a bump on the back of her leg for about 3-4 days, believes it is a boil. Some redness near boil, but not spreading. Denies drainage, afebrile. Patient stated the center appears to be open. Pain 2-3/10, but only present when being touched. Bump is about 1\" in diameter. Advised to be seen in [facility] for eval given no appts in office, pt stated understanding and agreed to plan.     Reason for call: Skin Problem  Onset: 3-4 days       Reason for Disposition   Boil > 1/2 inch across (> 12 mm; larger than a marble)    Protocols used: Boil (Skin Abscess)-A-OH

## 2025-02-03 ENCOUNTER — MED REC SCAN ONLY (OUTPATIENT)
Facility: CLINIC | Age: 75
End: 2025-02-03

## 2025-02-05 ENCOUNTER — OFFICE VISIT (OUTPATIENT)
Dept: ORTHOPEDICS CLINIC | Facility: CLINIC | Age: 75
End: 2025-02-05
Payer: MEDICARE

## 2025-02-05 DIAGNOSIS — R29.898 WEAKNESS OF RIGHT SHOULDER: ICD-10-CM

## 2025-02-05 DIAGNOSIS — Z98.890 S/P ARTHROSCOPY OF SHOULDER: Primary | ICD-10-CM

## 2025-02-05 PROCEDURE — 99213 OFFICE O/P EST LOW 20 MIN: CPT | Performed by: ORTHOPAEDIC SURGERY

## 2025-02-05 NOTE — PROGRESS NOTES
[facility]  [address] [place] [address]  [phone]       Name: Tootie Matamoros   MRN: [id number]  Date: 2/5/2025     REASON FOR VISIT: Fourth Post-Surgical Visit   Surgery:  Right shoulder arthroscopic rotator cuff repair, biceps tenodesis, subacromial decompression, extensive debridement distal clavicle excision on October 3, 2024.     INTERVAL HISTORY:  Tootie Matamoros is a 74 year old female who returns after the aforementioned procedure.  The post-operative course has been unremarkable with pain well controlled and overall progress noted.     Overall doing well, progressing with gradual strengthening.  Denies pain.  Has transition to team rehab in [place].    PE:   There were no vitals filed for this visit.  Estimated body mass index is 28.25 kg/m² as calculated from the following:    Height as of 12/11/24: 5' 6\" (1.676 m).    Weight as of 12/11/24: 175 lb (79.4 kg).    Physical Exam  Constitutional:       Appearance: Normal appearance.   HENT:      Head: Normocephalic and atraumatic.   Eyes:      Extraocular Movements: Extraocular movements intact.   Neck:      Musculoskeletal: Normal range of motion and neck supple.   Cardiovascular:      Pulses: Normal pulses.   Pulmonary:      Effort: Pulmonary effort is normal. No respiratory distress.   Abdominal:      General: There is no distension.   Skin:     General: Skin is warm.      Capillary Refill: Capillary refill takes less than 2 seconds.      Findings: No bruising.   Neurological:      General: No focal deficit present.      Mental Status: She is alert.   Psychiatric:         Mood and Affect: Mood normal.     Examination of the right shoulder demonstrates:     Physical examination the patient is alert and oriented x3, well-developed, well-nourished, no acute distress.     Full symmetric range of motion to the contralateral side with the exception of internal rotation lacking 10 degrees.  5 out of 5  strength in all rotator cuff muscles with slight endrange limitation.      The contralateral shoulder is without limitation in range of motion or strength, no positive provocative maneuvers.     IMPRESSION: Tootie Matamoros is a 74 year old female who presents 4 months s/p  Right shoulder arthroscopic rotator cuff repair, biceps tenodesis, subacromial decompression, extensive debridement distal clavicle excision on October 3, 2024.     PLAN:   We had a lengthy discussion with the patient regarding the patient's findings consistent with the expected postoperative course. We recommend continuation of physical therapy with rehabilitation efforts focused on strengthening, range of motion, functional ability, and return to baseline activity. The patient can continue to progress per protocol.    All questions were answered appropriately and the patient was in agreement with the treatment plan.       FOLLOW-UP:  Return to clinic in eight weeks. No imaging required at next visit.         Madiha Torres MD  Knee, Shoulder, & Elbow Surgery / Sports Medicine Specialist  Orthopaedic Surgery  [address] [place] [address] [id number] [place] [address].org  [email or web address]  t: [phone]  o: [phone]  f: [phone]     This note was dictated using Dragon software.  While it was briefly proofread prior to completion, some grammatical, spelling, and word choice errors due to dictation may still occur.

## 2025-03-19 ENCOUNTER — MED REC SCAN ONLY (OUTPATIENT)
Facility: CLINIC | Age: 75
End: 2025-03-19

## 2025-05-07 ENCOUNTER — OFFICE VISIT (OUTPATIENT)
Dept: ORTHOPEDICS CLINIC | Facility: CLINIC | Age: 75
End: 2025-05-07
Payer: MEDICARE

## 2025-05-07 DIAGNOSIS — Z98.890 S/P ARTHROSCOPY OF SHOULDER: Primary | ICD-10-CM

## 2025-05-07 PROCEDURE — 99213 OFFICE O/P EST LOW 20 MIN: CPT | Performed by: ORTHOPAEDIC SURGERY

## 2025-05-07 NOTE — PROGRESS NOTES
[facility] ORTHOPEDICS  [address] [place] [id number]  [phone]       Name: Tootie Matamoros   MRN: [id number]  Date: 5/7/2025     REASON FOR VISIT: Fifth Post-Surgical Visit   Surgery: Right shoulder arthroscopic rotator cuff repair, biceps tenodesis, subacromial decompression, extensive debridement distal clavicle excision on October 3, 2024.     INTERVAL HISTORY:  Tootie Matamoros is a 74 year old female who returns after the aforementioned procedure.  The post-operative course has been unremarkable with pain well controlled and overall progress noted.     Physical therapy was started and is progressing well.  Overall she is doing well.  She rates her pain to be 0 out of 10 and notes less than 100% normal function.  She is working with physical therapy at [facility] in [place].  No acute issues.       PE:   There were no vitals filed for this visit.  Estimated body mass index is 28.25 kg/m² as calculated from the following:    Height as of 12/11/24: 5' 6\" (1.676 m).    Weight as of 12/11/24: 175 lb (79.4 kg).    Physical Exam  Constitutional:       Appearance: Normal appearance.   HENT:      Head: Normocephalic and atraumatic.   Eyes:      Extraocular Movements: Extraocular movements intact.   Neck:      Musculoskeletal: Normal range of motion and neck supple.   Cardiovascular:      Pulses: Normal pulses.   Pulmonary:      Effort: Pulmonary effort is normal. No respiratory distress.   Abdominal:      General: There is no distension.   Skin:     General: Skin is warm.      Capillary Refill: Capillary refill takes less than 2 seconds.      Findings: No bruising.   Neurological:      General: No focal deficit present.      Mental Status: She is alert.   Psychiatric:         Mood and Affect: Mood normal.     Examination of the right shoulder demonstrates:     Physical examination the patient is alert and oriented x3, well-developed, well-nourished, no acute distress.      Full ROM and strength, symmetric.     Incisional sites are clean dry intact without signs of active pathology.      The contralateral shoulder is without limitation in range of motion or strength, no positive provocative maneuvers.     IMPRESSION: Tootie Matamoros is a 74 year old female who presents 7 months s/p Right shoulder arthroscopic rotator cuff repair, biceps tenodesis, subacromial decompression, extensive debridement distal clavicle excision on October 3, 2024.     PLAN:   We had a lengthy discussion with the patient regarding the patient's findings consistent with the expected postoperative course.     The patient notes near complete resolution of symptoms, and return to full baseline function. The patient can follow up with our office as needed. The patient had the opportunity to ask questions, and all questions were answered appropriately.     It has been a pleasure to take care of her!     FOLLOW-UP:  Return to clinic on an as needed basis.         Madiha Torres MD  Knee, Shoulder, & Elbow Surgery / Sports Medicine Specialist  Orthopaedic Surgery  [address] [place] [address].org  [email or web address]  t: [phone]  o: [phone]  f: [phone]     This note was dictated using Dragon software.  While it was briefly proofread prior to completion, some grammatical, spelling, and word choice errors due to dictation may still occur.

## 2025-05-20 ENCOUNTER — MED REC SCAN ONLY (OUTPATIENT)
Facility: CLINIC | Age: 75
End: 2025-05-20

## 2025-08-20 ENCOUNTER — TELEPHONE (OUTPATIENT)
Dept: INTERNAL MEDICINE CLINIC | Facility: CLINIC | Age: 75
End: 2025-08-20

## 2025-08-20 DIAGNOSIS — Z00.00 ROUTINE GENERAL MEDICAL EXAMINATION AT A HEALTH CARE FACILITY: Primary | ICD-10-CM

## 2025-08-20 DIAGNOSIS — I10 ESSENTIAL HYPERTENSION: ICD-10-CM

## (undated) NOTE — LETTER
08/05/21        820 [facility] [address] [place] [phone]      Dear Brain Cooler,    15[address] [facility] records indicate that you have outstanding lab work and or testing that was ordered for you and has not yet been completed:  Orders Placed This Enco

## (undated) NOTE — LETTER
09/17/20    Dear Sudhir Melendez,    We are contacting you from Dr. Chang Jasso office. Your health is important to us.   Therefore, we are sending this friendly reminder that you have the following overdue labs and/or tests which were ordered at your last office visi

## (undated) NOTE — LETTER
I hereby authorize Lennox Diaz my physician and his/her assistants (if applicable), which may include medical students, residents, and/or fellows, to perform the following surgical operation/ procedure and administer such anesthesia as may be determined necessary by my physician:  Operation/Procedure name (s)  Cardiac Catheterization, Left Ventricular Cineangiography, Bilateral Selective Coronary Angiography and/or Right Heart Catheterization; possible Percutaneous Transluminal Coronary Angioplasty, Coronary Atherectomy, Coronary Stent, Intracoronary Thrombolytic therapy, Antiplatelet therapy and/or Intravascular Ultrasound on [facility]   2. I recognize that during the surgical operation/procedure, unforeseen conditions may necessitate additional or different procedures than those listed above. I, therefore, further authorize and request that the above-named surgeon, assistants, or designees perform such procedures as are, in their judgment, necessary and desirable. 3.   My surgeon/physician has discussed prior to my surgery the potential benefits, risks and side effects of this procedure; the likelihood of achieving goals; and potential problems that might occur during recuperation. They also discussed reasonable alternatives to the procedure, including risks, benefits, and side effects related to the alternatives and risks related to not receiving this procedure. I have had all my questions answered and I acknowledge that no guarantee has been made as to the result that may be obtained. 4.   Should the need arise during my operation/procedure, which includes change of level of care prior to discharge, I also consent to the administration of blood and/or blood products. Further, I understand that despite careful testing and screening of blood or blood products by collecting agencies, I may still be subject to ill effects as a result of receiving a blood transfusion and/or blood products.   The following are some, but not all, of the potential risks that can occur: fever and allergic reactions, hemolytic reactions, transmission of diseases such as Hepatitis, AIDS and Cytomegalovirus (CMV) and fluid overload. In the event that I wish to have an autologous transfusion of my own blood, or a directed donor transfusion, I will discuss this with my physician. Check only if Refusing Blood or Blood Products  I understand refusal of blood or blood products as deemed necessary by my physician may have serious consequences to my condition to include possible death. I hereby assume responsibility for my refusal and release the hospital, its personnel, and my physicians from any responsibility for the consequences of my refusal.          o  Refuse      5. I authorize the use of any specimen, organs, tissues, body parts or foreign objects that may be removed from my body during the operation/procedure for diagnosis, research or teaching purposes and their subsequent disposal by hospital authorities. I also authorize the release of specimen test results and/or written reports to my treating physician on the hospital medical staff or other referring or consulting physicians involved in my care, at the discretion of the Pathologist or my treating physician. 6.   I consent to the photographing or videotaping of the operations or procedures to be performed, including appropriate portions of my body for medical, scientific, or educational purposes, provided my identity is not revealed by the pictures or by descriptive texts accompanying them. If the procedure has been photographed/videotaped, the surgeon will obtain the original picture, image, videotape or CD. The hospital will not be responsible for storage, release or maintenance of the picture, image, tape or CD.    7.   I consent to the presence of a  or observers in the operating room as deemed necessary by my physician or their designees. 8.   I recognize that in the event my procedure results in extended X-Ray/fluoroscopy time, I may develop a skin reaction. 9. If I have a Do Not Attempt Resuscitation (DNAR) order in place, that status will be suspended while in the operating room, procedural suite, and during the recovery period unless otherwise explicitly stated by me (or a person authorized to consent on my behalf). The surgeon or my attending physician will determine when the applicable recovery period ends for purposes of reinstating the DNAR order. 10. Patients having a sterilization procedure: I understand that if the procedure is successful the results will be permanent and it will therefore be impossible for me to inseminate, conceive, or bear children. I also understand that the procedure is intended to result in sterility, although the result has not been guaranteed. 11. I acknowledge that my physician has explained sedation/analgesia administration to me including the risk and benefits I consent to the administration of sedation/analgesia as may be necessary or desirable in the judgment of my physician.     I CERTIFY THAT I HAVE READ AND FULLY UNDERSTAND THE ABOVE CONSENT TO OPERATION and/or OTHER PROCEDURE.        ____________________________________       _________________________________      ______________________________  Signature of Patient         Signature of Responsible Person        Printed Name of Responsible Person    ____________________________________      _________________________________      ______________________________       Signature of Witness          Relationship to Patient                       Date                                       Time  Patient Name: Demond Estrada     : 1950                 Printed: 2023      Medical Record #: [id number]                      Page 1 of 1

## (undated) NOTE — LETTER
12/16/20        820 [facility] [address] [place] [phone]      Dear Bernie Dia,    [facility] records indicate that you have outstanding lab work and or testing that was ordered for you and has not yet been completed:  Orders Placed This Enco

## (undated) NOTE — LETTER
07/26/18        820 [facility] [address] [place] [phone]      Dear Nona Sandoval,    15[facility] records indicate that you have outstanding lab work and or testing that was ordered for you and has not yet been completed:          Comp Metabolic

## (undated) NOTE — LETTER
04/24/18        820 [facility] [address] [place] [phone]      Dear Andrae Pugh,    15[facility] records indicate that you have outstanding lab work and or testing that was ordered for you and has not yet been completed:          Occult Blood, F

## (undated) NOTE — LETTER
10/30/19        820 [facility] [address] [place] [phone]      Dear Chata Torres,    15[address] [facility] records indicate that you have outstanding lab work and or testing that was ordered for you and has not yet been completed:  Orders Placed This Enco

## (undated) NOTE — LETTER
Alexis Mcarthur M.D., F.A.C.S. Poonam Hamilton M.D., F.A.C.S. Ramila Banks M.D., Mag Body. WILBERTO March M.D., F.A.C.S. Dorys Cuello. Alexys Martinez M.D., F.A.C.S. SHELBY Oliver M. Alban Crease A.D. Noreene Nam, M.D., F.A.C.S. Clemencia Nina. Estephania Blanco M.D., F.A.C.S. Korin Solomon M.D., F.A.C.S. Casandra Prado M.D., F.A.C.S. Alejandro Watkins M.D. F.A.C.S. Elier Gorman. Uday Rodrigues M.D., F.A.C.S. Alex Harrington M.D., F.A.C.S. Nicole Grimm M.D., F.A.C.S., F.A.C.CShawn Armenta M.D., F.A.C.S. James A. Starlet Collet, M.D., F.A.C.S. Dottie Leary M.D., F.A.C.S. Adriana Taylor. Demetrice Gonzalez M.D., F.A.C.S. Ignacio Fothergill, M.D. Carine Myles M.D., Mag Body. C.S  Yamila Pedersen M.D. Williams Pan M.D., F.A.C.S. David [place]. Izabela Kelly M.D., F.A.C.S. Sindhu Bejarano M.D. Robert Sharma M.D.  Celestino Elizabeth M.D. PhD.  Celio Bennett M.D. Christina Dave M.D. F A C S. Cabrera Bonner. Carlitos Gaitan M.D. Muna Ross M.D. [place], M.D. Raz Walker M.D.   Chris Goetz D.O., F.A.C.S. Catheline Bloch, M.D., F.A.C.S. Bear Lopez M.D. Welcome to [facility] As you contemplate possible surgical treatment, it is very important to us that you understand fully what is being discussed, that all of your questions have been answered, and that your options for treatment have been fully explained. To that end, on the following page we will ask you some questions to make certain that you understand everything which has been explained to you. Included in this understanding is that there are both surgical and nonsurgical treatments available for you, that you have options regarding where your care is given, and what doctors are involved in your care. Included in these options would, of course, be the option to elect for no treatment whatsoever.  We especially want to be sure that you have had a chance to have all of your questions and concerns answered. If there are any issues which have not been adequately addressed, we ask you to bring them forward so that we can thoroughly address them. A patient who is fully informed and understands their condition and options for treatment, as well as potential adverse effects of treatment, is going to be a patient who receives the most benefit out of care rendered. Our goal in addition to providing excellent surgical care is to provide the necessary information to you and your family in order to make decisions which are appropriate relative to your own care. Please take the time necessary to read and answer the questions on the next page. Again, if you have any questions, bring them forward and we will certainly address them. Sincerely,    [facility].    _______________________  ____________________________________  Date:                                             Patient Signature  ________________________  Meredith Winters  Witness Consent Form         Revised: 2015  Patient Name: Meredith Winters     : 1950                 Printed: 6/15/13 9:43 AM     Medical Record #: [id number]                Page: 1 of  2        [facility] Supplemental Consent Form    A [facility] ([facility]) surgeon has met with me and explained the matter of my illness, and what treatments might be available to improve my condition. As a result of that conversation, I understand the following:    A [facility] surgeon met with me and explained, in detail, the nature of my condition for which surgery is being contemplated. The procedure to be performed  Is:              Yes _____ No _____    A [facility] surgeon has explained to me that there are alternatives to surgery which might include no surgery, medical therapy, or interventional treatment, among other options and the risks and benefits of the different treatment options:    Yes _____ No _____    A [facility] surgeon as explained to me that if I should so desire, he/she is willing to explain my case and the surgical and non-surgical options to family members: Yes _____ No _____    A [facility] surgeon has answered all of my questions regarding the topics we have discussed. I have been invited to ask more questions:  Yes _____ No _____    A [facility] surgeon has explained to me that if I seek other options or wish treatment at another facility in [place] or [place], or anywhere in the [place], that his/her office will assist me in making such accommodations:   Yes _____ No _____    A [facility] surgeon has explained to me, that death, risk of bleeding, stroke, multi-organ failure, heart attack or other complications are risks for the proposed surgical procedure: Yes _____ No _____    A [facility] surgeon has explained to me that I have the right to cancel or postpone the surgery at any time prior to the start of surgery: Yes _____ No _____    The nature and options for treatment for my condition have been explained to me, in detail, by a [facility] surgeon and all questions have been answered to my satisfaction. I understand that I am not required to undergo surgery, and further, that if I so desire, I could have surgery accomplished by another surgeon or at another institution. I understand and accept that which has been explained to me.  I am able to make my decisions knowingly and willfully based on the data.    ______________________   __________________________________  Date       Patient Signature  ______________________________ Donzetta Shlomo  Witness Consent Form   Patient Name: Kiah Section: 12/2/1950                 Medical Record #: [id number]    Page: 2 of 2        Printed: April 28, 2023

## (undated) NOTE — LETTER
Notifier: [employer]       Patient Name: Tootie Matamoros       Identification Number: [id number]                          Advance Beneficiary Notice of Noncoverage (ABN)   NOTE:  If Medicare doesn’t pay for D. Items/service(s) below, you may have to pay.  Medicare does not pay for everything, even some care that you or your health care provider have good reason to think you need. We expect Medicare may not pay for the D. items/service(s) below.  Items or Services Reason Medicare May Not Pay: Estimated Cost       Right subacromial bursa injection, ultrasound guidance local anesthesia  __ Medicare does not cover this service      __ Medicare may not pay for this   item/service for your condition     __ Medicare may not pay for this item/service as often as this        WHAT YOU NEED TO DO NOW:  Read this notice, so you can make an informed decision about your care.  Ask us any questions that you may have after you finish reading.  Choose an option below about whether to receive the D. item/service(s)  listed above.  Note: If you choose Option 1 or 2, we may help you to use any other insurance that you might have, but Medicare cannot require us to do this.  OPTIONS: Check only one box.  We cannot choose a box for you.   OPTION 1. I want the D. item/service(s) listed above. You may ask to be paid now, but I also want Medicare billed for an official decision on payment, which is sent to me on a Medicare Summary Notice (MSN). I understand that if Medicare doesn’t pay, I am responsible for payment, but I can appeal to Medicare by following the directions on the MSN. If Medicare does pay, you will refund any payments I made to you, less co-pays or deductibles.  OPTION 2. I want the D. item/service(s) listed above, but do not bill Medicare. You may ask to be paid now as I am responsible for payment. I cannot appeal if Medicare is not billed.  OPTION 3. I don't want the D. item/service(s) listed above. I understand  with this choice I am not responsible for payment, and I cannot appeal to see if Medicare would pay.    H. Additional Information:    This notice gives our opinion, not an official Medicare decision. If you have other questions on this notice or Medicare billing, call 1-800-MEDICARE (1-106.349.1005/TTY: [phone]). Signing below means that you have received and understand this notice. You also receive a copy.  Signature: Date:       You have the right to get Medicare information in an accessible format, like large print, Braille, or audio. You also have the right to file a complaint if you feel you’ve been discriminated against. Visit Medicare.gov/about- us/kkhfqrktntvgl-qaivlynscsgysghin-ljerzy.  According to the Paperwork Reduction Act of 1995, no persons are required to respond to a collection of information unless it displays a valid OMB control number. The valid OMB control number for this information collection is 7955-1671. The time required to complete this information collection is estimated to average 7 minutes per response, including the time to review instructions, search existing data resources, gather the data needed, and complete and review the information collection. If you have comments concerning the accuracy of the time estimate or suggestions for improving this form, please write to: CMS, [address]     Sundar, Babakn: VINICIUS Reports Clearance Officer, [place], [place] [address].  Form CMS-R-131 (Exp. 1/31/2026) Form Approved OMB No. [id number]

## (undated) NOTE — LETTER
07/10/24    Orthopedic Surgery   Pre-Operative Clearance Request    Patient Name:   Tootie Matamoros             :   1950    Surgeon: Dr. Torres             Date of Surgery: 10/03/2024    Surgical Procedure: Right Shoulder Arthroscopy Rotator Cuff Repair Arthroscopic Biceps Tenodesis Subacromial Decompression Distal Clavicle Excision Extensive Debridement       MUST COMPLETE ALL OF THE FOLLOWING 2-3 WEEKS PRIOR TO YOUR SURGERY TO AVOID CANCELLATION, DUE TO THE RULE THEIR WILL BE NO EXCEPTIONS!      [x]  History and Physical        [x]  Medical  Clearance                         **Please fax test results, H&P, and clearance to [phone] and to P.A.T at [phone]**

## (undated) NOTE — LETTER
12/06/19        820 [facility] [address] [place] [phone]      Dear Hansa Acosta,    [facility] records indicate that you have outstanding lab work and or testing that was ordered for you and has not yet been completed:  Orders Placed This Enco

## (undated) NOTE — LETTER
AUTHORIZATION FOR SURGICAL OPERATION OR OTHER PROCEDURE    1. I hereby authorize Dr. Alfredito Camarillo and the [facility] staff assigned to my case to perform the following operation and/or procedure at the [facility]:    Right subacromial bursa injection, ultrasound guidance local anesthesia     2.  My physician has explained the nature and purpose of the operation or other procedure, possible alternative methods of treatment, the risks involved, and the possibility of complication to me.  I acknowledge that no guarantee has been made as to the result that may be obtained.  3.  I recognize that, during the course of this operation, or other procedure, unforseen conditions may necessitate additional or different procedure than those listed above.  I, therefore, further authorize and request that the above named physician, his/her physician assistants or designees perform such procedures as are, in his/her professional opinion, necessary and desirable.  4.  Any tissue or organs removed in the operation or other procedure may be disposed of by and at the discretion of the [facility] staff and [facility].  5.  I understand that in the event of a medical emergency, I will be transported by local paramedics to [facility] or other hospital emergency department.  6.  I certify that I have read and fully understand the above consent to operation and/or other procedure.    7.  I acknowledge that my physician has explained sedation/analgesia administration to me including the risks and benefits.  I consent to the administration of sedation/analgesia as may be necessary or desirable in the judgement of my physician.    Witness signature: ___________________________________________________ Date:  ______/______/_____                    Time:  ________ A.M.  P.M.       Patient Name:  Tootie Matamoros  12/2/1950  [id number]         Patient signature:   ___________________________________________________                   Statement of Physician  My signature below affirms that prior to the time of the procedure, I have explained to the patient and/or his/her guardian, the risks and benefits involved in the proposed treatment and any reasonable alternative to the proposed treatment.  I have also explained the risks and benefits involved in the refusal of the proposed treatment and have answered the patient's questions.                        Date:  ______/______/_______  Provider                      Signature:  __________________________________________________________       Time:  ___________ AANGELINA CABELLO